# Patient Record
Sex: FEMALE | Race: WHITE | Employment: FULL TIME | URBAN - METROPOLITAN AREA
[De-identification: names, ages, dates, MRNs, and addresses within clinical notes are randomized per-mention and may not be internally consistent; named-entity substitution may affect disease eponyms.]

---

## 2017-10-24 ENCOUNTER — HOSPITAL ENCOUNTER (EMERGENCY)
Age: 22
Discharge: HOME | End: 2017-10-24
Attending: EMERGENCY MEDICINE | Admitting: EMERGENCY MEDICINE
Payer: SELF-PAY

## 2017-10-24 ENCOUNTER — APPOINTMENT (OUTPATIENT)
Dept: CT IMAGING | Age: 22
End: 2017-10-24
Payer: SELF-PAY

## 2017-10-24 VITALS
HEART RATE: 96 BPM | RESPIRATION RATE: 16 BRPM | TEMPERATURE: 97.5 F | BODY MASS INDEX: 26.93 KG/M2 | OXYGEN SATURATION: 99 % | HEIGHT: 66 IN | WEIGHT: 167.55 LBS | SYSTOLIC BLOOD PRESSURE: 137 MMHG | DIASTOLIC BLOOD PRESSURE: 102 MMHG

## 2017-10-24 DIAGNOSIS — R11.2 NON-INTRACTABLE VOMITING WITH NAUSEA, UNSPECIFIED VOMITING TYPE: ICD-10-CM

## 2017-10-24 DIAGNOSIS — N83.201 OVARIAN CYST, RIGHT: ICD-10-CM

## 2017-10-24 DIAGNOSIS — R10.31 ABDOMINAL PAIN, RIGHT LOWER QUADRANT: Primary | ICD-10-CM

## 2017-10-24 DIAGNOSIS — B37.31 CANDIDIASIS, VAGINA: ICD-10-CM

## 2017-10-24 DIAGNOSIS — R73.9 HYPERGLYCEMIA: ICD-10-CM

## 2017-10-24 LAB
ALBUMIN SERPL-MCNC: 2.8 G/DL (ref 3.5–5)
ALBUMIN/GLOB SERPL: 0.6 {RATIO} (ref 1.1–2.2)
ALP SERPL-CCNC: 224 U/L (ref 45–117)
ALT SERPL-CCNC: 85 U/L (ref 12–78)
ANION GAP SERPL CALC-SCNC: 20 MMOL/L (ref 5–15)
APPEARANCE UR: CLEAR
AST SERPL-CCNC: 70 U/L (ref 15–37)
BACTERIA URNS QL MICRO: ABNORMAL /HPF
BASOPHILS # BLD: 0 K/UL (ref 0–0.1)
BASOPHILS NFR BLD: 0 % (ref 0–1)
BILIRUB SERPL-MCNC: 0.3 MG/DL (ref 0.2–1)
BILIRUB UR QL: NEGATIVE
BUN SERPL-MCNC: 16 MG/DL (ref 6–20)
BUN/CREAT SERPL: 15 (ref 12–20)
CALCIUM SERPL-MCNC: 8.5 MG/DL (ref 8.5–10.1)
CHLORIDE SERPL-SCNC: 101 MMOL/L (ref 97–108)
CLUE CELLS VAG QL WET PREP: NORMAL
CO2 SERPL-SCNC: 13 MMOL/L (ref 21–32)
COLOR UR: ABNORMAL
CREAT SERPL-MCNC: 1.07 MG/DL (ref 0.55–1.02)
EOSINOPHIL # BLD: 0.1 K/UL (ref 0–0.4)
EOSINOPHIL NFR BLD: 1 % (ref 0–7)
EPITH CASTS URNS QL MICRO: ABNORMAL /LPF
ERYTHROCYTE [DISTWIDTH] IN BLOOD BY AUTOMATED COUNT: 16.3 % (ref 11.5–14.5)
GLOBULIN SER CALC-MCNC: 4.9 G/DL (ref 2–4)
GLUCOSE BLD STRIP.AUTO-MCNC: 71 MG/DL (ref 65–100)
GLUCOSE BLD STRIP.AUTO-MCNC: 73 MG/DL (ref 65–100)
GLUCOSE BLD STRIP.AUTO-MCNC: 74 MG/DL (ref 65–100)
GLUCOSE BLD STRIP.AUTO-MCNC: 82 MG/DL (ref 65–100)
GLUCOSE BLD STRIP.AUTO-MCNC: 83 MG/DL (ref 65–100)
GLUCOSE BLD STRIP.AUTO-MCNC: 85 MG/DL (ref 65–100)
GLUCOSE SERPL-MCNC: 481 MG/DL (ref 65–100)
GLUCOSE UR STRIP.AUTO-MCNC: >1000 MG/DL
HCG UR QL: NEGATIVE
HCT VFR BLD AUTO: 38.1 % (ref 35–47)
HGB BLD-MCNC: 12.2 G/DL (ref 11.5–16)
HGB UR QL STRIP: ABNORMAL
HYALINE CASTS URNS QL MICRO: ABNORMAL /LPF (ref 0–5)
KETONES UR QL STRIP.AUTO: 15 MG/DL
KOH PREP SPEC: NORMAL
LEUKOCYTE ESTERASE UR QL STRIP.AUTO: ABNORMAL
LIPASE SERPL-CCNC: 133 U/L (ref 73–393)
LYMPHOCYTES # BLD: 2.3 K/UL (ref 0.8–3.5)
LYMPHOCYTES NFR BLD: 23 % (ref 12–49)
MCH RBC QN AUTO: 27.3 PG (ref 26–34)
MCHC RBC AUTO-ENTMCNC: 32 G/DL (ref 30–36.5)
MCV RBC AUTO: 85.2 FL (ref 80–99)
MONOCYTES # BLD: 0.3 K/UL (ref 0–1)
MONOCYTES NFR BLD: 4 % (ref 5–13)
NEUTS SEG # BLD: 7.1 K/UL (ref 1.8–8)
NEUTS SEG NFR BLD: 72 % (ref 32–75)
NITRITE UR QL STRIP.AUTO: NEGATIVE
PH UR STRIP: 5.5 [PH] (ref 5–8)
PLATELET # BLD AUTO: 441 K/UL (ref 150–400)
POTASSIUM SERPL-SCNC: 3.5 MMOL/L (ref 3.5–5.1)
PROT SERPL-MCNC: 7.7 G/DL (ref 6.4–8.2)
PROT UR STRIP-MCNC: NEGATIVE MG/DL
RBC # BLD AUTO: 4.47 M/UL (ref 3.8–5.2)
RBC #/AREA URNS HPF: ABNORMAL /HPF (ref 0–5)
SERVICE CMNT-IMP: NORMAL
SODIUM SERPL-SCNC: 134 MMOL/L (ref 136–145)
SP GR UR REFRACTOMETRY: 1.03 (ref 1–1.03)
T VAGINALIS VAG QL WET PREP: NORMAL
UA: UC IF INDICATED,UAUC: ABNORMAL
UROBILINOGEN UR QL STRIP.AUTO: 0.2 EU/DL (ref 0.2–1)
WBC # BLD AUTO: 9.8 K/UL (ref 3.6–11)
WBC URNS QL MICRO: ABNORMAL /HPF (ref 0–4)

## 2017-10-24 PROCEDURE — 87210 SMEAR WET MOUNT SALINE/INK: CPT | Performed by: PHYSICIAN ASSISTANT

## 2017-10-24 PROCEDURE — 74011250636 HC RX REV CODE- 250/636: Performed by: EMERGENCY MEDICINE

## 2017-10-24 PROCEDURE — 36415 COLL VENOUS BLD VENIPUNCTURE: CPT | Performed by: EMERGENCY MEDICINE

## 2017-10-24 PROCEDURE — 74011250636 HC RX REV CODE- 250/636: Performed by: PHYSICIAN ASSISTANT

## 2017-10-24 PROCEDURE — 80053 COMPREHEN METABOLIC PANEL: CPT | Performed by: EMERGENCY MEDICINE

## 2017-10-24 PROCEDURE — 74177 CT ABD & PELVIS W/CONTRAST: CPT

## 2017-10-24 PROCEDURE — 81025 URINE PREGNANCY TEST: CPT | Performed by: EMERGENCY MEDICINE

## 2017-10-24 PROCEDURE — 74011636320 HC RX REV CODE- 636/320: Performed by: PHYSICIAN ASSISTANT

## 2017-10-24 PROCEDURE — 74011636637 HC RX REV CODE- 636/637: Performed by: PHYSICIAN ASSISTANT

## 2017-10-24 PROCEDURE — 87147 CULTURE TYPE IMMUNOLOGIC: CPT

## 2017-10-24 PROCEDURE — 81001 URINALYSIS AUTO W/SCOPE: CPT | Performed by: EMERGENCY MEDICINE

## 2017-10-24 PROCEDURE — 85025 COMPLETE CBC W/AUTO DIFF WBC: CPT | Performed by: EMERGENCY MEDICINE

## 2017-10-24 PROCEDURE — 96361 HYDRATE IV INFUSION ADD-ON: CPT

## 2017-10-24 PROCEDURE — 96374 THER/PROPH/DIAG INJ IV PUSH: CPT

## 2017-10-24 PROCEDURE — 99285 EMERGENCY DEPT VISIT HI MDM: CPT

## 2017-10-24 PROCEDURE — 87491 CHLMYD TRACH DNA AMP PROBE: CPT | Performed by: PHYSICIAN ASSISTANT

## 2017-10-24 PROCEDURE — 87086 URINE CULTURE/COLONY COUNT: CPT

## 2017-10-24 PROCEDURE — 74011636320 HC RX REV CODE- 636/320: Performed by: EMERGENCY MEDICINE

## 2017-10-24 PROCEDURE — 83690 ASSAY OF LIPASE: CPT | Performed by: PHYSICIAN ASSISTANT

## 2017-10-24 PROCEDURE — 82962 GLUCOSE BLOOD TEST: CPT

## 2017-10-24 PROCEDURE — 96375 TX/PRO/DX INJ NEW DRUG ADDON: CPT

## 2017-10-24 PROCEDURE — 96376 TX/PRO/DX INJ SAME DRUG ADON: CPT

## 2017-10-24 RX ORDER — MORPHINE SULFATE 4 MG/ML
2 INJECTION, SOLUTION INTRAMUSCULAR; INTRAVENOUS
Status: COMPLETED | OUTPATIENT
Start: 2017-10-24 | End: 2017-10-24

## 2017-10-24 RX ORDER — ONDANSETRON 2 MG/ML
4 INJECTION INTRAMUSCULAR; INTRAVENOUS
Status: COMPLETED | OUTPATIENT
Start: 2017-10-24 | End: 2017-10-24

## 2017-10-24 RX ORDER — SODIUM CHLORIDE 0.9 % (FLUSH) 0.9 %
5-10 SYRINGE (ML) INJECTION EVERY 8 HOURS
Status: DISCONTINUED | OUTPATIENT
Start: 2017-10-24 | End: 2017-10-24 | Stop reason: HOSPADM

## 2017-10-24 RX ORDER — SODIUM CHLORIDE 0.9 % (FLUSH) 0.9 %
5-10 SYRINGE (ML) INJECTION AS NEEDED
Status: DISCONTINUED | OUTPATIENT
Start: 2017-10-24 | End: 2017-10-24 | Stop reason: HOSPADM

## 2017-10-24 RX ORDER — ONDANSETRON 4 MG/1
4 TABLET, ORALLY DISINTEGRATING ORAL
Qty: 10 TAB | Refills: 0 | Status: SHIPPED | OUTPATIENT
Start: 2017-10-24 | End: 2017-11-28

## 2017-10-24 RX ORDER — SODIUM CHLORIDE 9 MG/ML
50 INJECTION, SOLUTION INTRAVENOUS
Status: COMPLETED | OUTPATIENT
Start: 2017-10-24 | End: 2017-10-24

## 2017-10-24 RX ORDER — SODIUM CHLORIDE 0.9 % (FLUSH) 0.9 %
10 SYRINGE (ML) INJECTION
Status: COMPLETED | OUTPATIENT
Start: 2017-10-24 | End: 2017-10-24

## 2017-10-24 RX ORDER — FLUCONAZOLE 150 MG/1
150 TABLET ORAL
Qty: 1 TAB | Refills: 1 | Status: SHIPPED | OUTPATIENT
Start: 2017-10-24 | End: 2017-10-24

## 2017-10-24 RX ORDER — MORPHINE SULFATE 4 MG/ML
4 INJECTION, SOLUTION INTRAMUSCULAR; INTRAVENOUS
Status: COMPLETED | OUTPATIENT
Start: 2017-10-24 | End: 2017-10-24

## 2017-10-24 RX ORDER — HYDROCODONE BITARTRATE AND ACETAMINOPHEN 5; 325 MG/1; MG/1
1 TABLET ORAL
Qty: 15 TAB | Refills: 0 | Status: SHIPPED | OUTPATIENT
Start: 2017-10-24 | End: 2017-11-28

## 2017-10-24 RX ORDER — INSULIN ASPART 100 [IU]/ML
INJECTION, SOLUTION INTRAVENOUS; SUBCUTANEOUS
Status: ON HOLD | COMMUNITY
End: 2017-11-29

## 2017-10-24 RX ORDER — INSULIN GLARGINE 100 [IU]/ML
42 INJECTION, SOLUTION SUBCUTANEOUS DAILY
Status: ON HOLD | COMMUNITY
End: 2017-11-29

## 2017-10-24 RX ADMIN — DIATRIZOATE MEGLUMINE AND DIATRIZOATE SODIUM 30 ML: 600; 100 SOLUTION ORAL; RECTAL at 13:59

## 2017-10-24 RX ADMIN — SODIUM CHLORIDE 50 ML/HR: 900 INJECTION, SOLUTION INTRAVENOUS at 15:20

## 2017-10-24 RX ADMIN — Medication 10 ML: at 15:20

## 2017-10-24 RX ADMIN — INSULIN HUMAN 6 UNITS: 100 INJECTION, SOLUTION PARENTERAL at 13:55

## 2017-10-24 RX ADMIN — Medication 10 ML: at 13:59

## 2017-10-24 RX ADMIN — SODIUM CHLORIDE 1000 ML: 900 INJECTION, SOLUTION INTRAVENOUS at 13:55

## 2017-10-24 RX ADMIN — MORPHINE SULFATE 4 MG: 4 INJECTION, SOLUTION INTRAMUSCULAR; INTRAVENOUS at 13:56

## 2017-10-24 RX ADMIN — IOPAMIDOL 100 ML: 755 INJECTION, SOLUTION INTRAVENOUS at 15:20

## 2017-10-24 RX ADMIN — MORPHINE SULFATE 2 MG: 4 INJECTION, SOLUTION INTRAMUSCULAR; INTRAVENOUS at 17:45

## 2017-10-24 RX ADMIN — ONDANSETRON HYDROCHLORIDE 4 MG: 2 INJECTION, SOLUTION INTRAMUSCULAR; INTRAVENOUS at 17:45

## 2017-10-24 RX ADMIN — ONDANSETRON 4 MG: 2 INJECTION INTRAMUSCULAR; INTRAVENOUS at 13:56

## 2017-10-24 NOTE — ED NOTES
Shift report received from Shiela Brady RN. Assumed care of patient at this time. Patient at CT at this time.

## 2017-10-24 NOTE — DISCHARGE INSTRUCTIONS
Abdominal Pain: Care Instructions  Your Care Instructions    Abdominal pain has many possible causes. Some aren't serious and get better on their own in a few days. Others need more testing and treatment. If your pain continues or gets worse, you need to be rechecked and may need more tests to find out what is wrong. You may need surgery to correct the problem. Don't ignore new symptoms, such as fever, nausea and vomiting, urination problems, pain that gets worse, and dizziness. These may be signs of a more serious problem. Your doctor may have recommended a follow-up visit in the next 8 to 12 hours. If you are not getting better, you may need more tests or treatment. The doctor has checked you carefully, but problems can develop later. If you notice any problems or new symptoms, get medical treatment right away. Follow-up care is a key part of your treatment and safety. Be sure to make and go to all appointments, and call your doctor if you are having problems. It's also a good idea to know your test results and keep a list of the medicines you take. How can you care for yourself at home? · Rest until you feel better. · To prevent dehydration, drink plenty of fluids, enough so that your urine is light yellow or clear like water. Choose water and other caffeine-free clear liquids until you feel better. If you have kidney, heart, or liver disease and have to limit fluids, talk with your doctor before you increase the amount of fluids you drink. · If your stomach is upset, eat mild foods, such as rice, dry toast or crackers, bananas, and applesauce. Try eating several small meals instead of two or three large ones. · Wait until 48 hours after all symptoms have gone away before you have spicy foods, alcohol, and drinks that contain caffeine. · Do not eat foods that are high in fat. · Avoid anti-inflammatory medicines such as aspirin, ibuprofen (Advil, Motrin), and naproxen (Aleve).  These can cause stomach upset. Talk to your doctor if you take daily aspirin for another health problem. When should you call for help? Call 911 anytime you think you may need emergency care. For example, call if:  · You passed out (lost consciousness). · You pass maroon or very bloody stools. · You vomit blood or what looks like coffee grounds. · You have new, severe belly pain. Call your doctor now or seek immediate medical care if:  · Your pain gets worse, especially if it becomes focused in one area of your belly. · You have a new or higher fever. · Your stools are black and look like tar, or they have streaks of blood. · You have unexpected vaginal bleeding. · You have symptoms of a urinary tract infection. These may include:  ¨ Pain when you urinate. ¨ Urinating more often than usual.  ¨ Blood in your urine. · You are dizzy or lightheaded, or you feel like you may faint. Watch closely for changes in your health, and be sure to contact your doctor if:  · You are not getting better after 1 day (24 hours). Where can you learn more? Go to http://j[employer] Internationalcharito.info/. Enter N910 in the search box to learn more about \"Abdominal Pain: Care Instructions. \"  Current as of: March 20, 2017  Content Version: 11.3  © 1688-7628 [employer]. Care instructions adapted under license by [employer] (which disclaims liability or warranty for this information). If you have questions about a medical condition or this instruction, always ask your healthcare professional. [place] [address] any warranty or liability for your use of this information. Functional Ovarian Cyst: Care Instructions  Your Care Instructions    A functional ovarian cyst is a sac that forms on the surface of a woman's ovary during ovulation. The sac holds a maturing egg. Usually the sac goes away after the egg is released.  But if the egg is not released, or if the sac closes up after the egg is released, the sac can swell up with fluid and form a cyst.  Functional ovarian cysts are different than ovarian growths caused by other problems, such as cancer. Most functional ovarian cysts cause no symptoms and go away on their own. Some cause mild pain. Others can cause severe pain when they rupture or bleed. Follow-up care is a key part of your treatment and safety. Be sure to make and go to all appointments, and call your doctor if you are having problems. It's also a good idea to know your test results and keep a list of the medicines you take. How can you care for yourself at home? · Use heat, such as a hot water bottle, a heating pad set on low, or a warm bath, to relax tense muscles and relieve cramping. · Take pain medicines exactly as directed. ¨ If the doctor gave you a prescription medicine for pain, take it as prescribed. ¨ If you are not taking a prescription pain medicine, ask your doctor if you can take an over-the-counter medicine. · Avoid constipation. Make sure you drink enough fluids and include fruits, vegetables, and fiber in your diet each day. Constipation does not cause ovarian cysts, but it may make your pelvic pain worse. When should you call for help? Call 911 anytime you think you may need emergency care. For example, call if:  · You passed out (lost consciousness). · You have sudden, severe pain in your belly or your pelvis. Call your doctor now or seek immediate medical care if:  · You have new belly or pelvic pain, or your pain gets worse. · You have severe vaginal bleeding. This means that you are soaking through your usual pads or tampons each hour for 2 or more hours. · You are dizzy or lightheaded, or you feel like you may faint. · You have pain or bleeding during or after sex. Watch closely for changes in your health, and be sure to contact your doctor if:  · Your pain keeps you from doing the things that you enjoy. · You do not get better as expected.   Where can you learn more? Go to http://jude-charito.info/. Enter K771 in the search box to learn more about \"Functional Ovarian Cyst: Care Instructions. \"  Current as of: October 13, 2016  Content Version: 11.3  © 6908-3496 [employer]. Care instructions adapted under license by [employer] (which disclaims liability or warranty for this information). If you have questions about a medical condition or this instruction, always ask your healthcare professional. [address] any warranty or liability for your use of this information. Candidiasis: Care Instructions  Your Care Instructions  Candidiasis (say \"gpf-otc-AI-uh-ama\") is a yeast infection. Yeast normally lives in your body. But it can cause problems if your body's defenses don't work as they should. Some medicines can increase your chance of getting a yeast infection. These include antibiotics, steroids, and cancer drugs. And some diseases like AIDS and diabetes can make you more likely to get yeast infections. There are different types of yeast infections. Ida Apt is a yeast infection in the mouth. It usually occurs in people with weak immune systems. It causes white patches inside the mouth and throat. Yeast infections of the skin usually occur in skin folds where the skin stays moist. They cause red, oozing patches on your skin. Babies can get these infections under the diaper. People who often wear gloves can get them on their hands. Many women get vaginal yeast infections. They are most common when women take antibiotics. These infections can cause the vagina to itch and burn. They also cause white discharge that looks like cottage cheese. In rare cases, yeast infects the blood. This can cause serious disease. This kind of infection is treated with medicine given through a needle into a vein (IV). After you start treatment, a yeast infection usually goes away quickly.  But if your immune system is weak, the infection may come back. Tell your doctor if you get yeast infections often. Follow-up care is a key part of your treatment and safety. Be sure to make and go to all appointments, and call your doctor if you are having problems. It's also a good idea to know your test results and keep a list of the medicines you take. How can you care for yourself at home? · Take your medicines exactly as prescribed. Call your doctor if you think you are having a problem with your medicine. · Use antibiotics only as directed by your doctor. · Eat yogurt with live cultures. It has bacteria called lactobacillus. It may help prevent some types of yeast infections. · Keep your skin clean and dry. Put powder on moist places. · If you are using a cream or suppository to treat a vaginal yeast infection, don't use condoms or a diaphragm. Use a different type of birth control. · Eat a healthy diet and get regular exercise. This will help keep your immune system strong. When should you call for help? Call your doctor now or seek immediate medical care if:  · You have a fever. · You are pregnant and have signs of a vaginal or urinary tract infection such as:  ¨ Severe itching in your vagina. ¨ Pain during sex or when you urinate. ¨ Unusual discharge from your vagina. ¨ A frequent urge to urinate. ¨ Urine that is cloudy or smells bad. Watch closely for changes in your health, and be sure to contact your doctor if:  · You do not get better as expected. Where can you learn more? Go to http://jude-charito.info/. Enter C923 in the search box to learn more about \"Candidiasis: Care Instructions. \"  Current as of: October 13, 2016  Content Version: 11.3  © 4245-8283 [employer]. Care instructions adapted under license by [employer] (which disclaims liability or warranty for this information).  If you have questions about a medical condition or this instruction, always ask your healthcare professional. [place] [address] any warranty or liability for your use of this information.

## 2017-10-24 NOTE — ED PROVIDER NOTES
[facility].  EMERGENCY DEPARTMENT HISTORY AND PHYSICAL EXAM       Date of Service: 10/24/2017   Patient Name: Adi Harris   YOB: 1995  Medical Record Number: [id number]    History of Presenting Illness     Chief Complaint   Patient presents with    Abdominal Pain     pt c/o diffuse abdominal pain x1.5 weeks with nausea. Last menstrual period was early September. Pt denies diarrhea or urinary symptoms. History Provided By:  patient    Additional History:   Adi Harris is a 25 y.o. female with PMhx significant for IDDM who presents ambulatory to the ED with cc of intermittent, sharp ABD pain x 1-2 weeks. She states her pain is located in her lower ABD. Pt is also complaining of N/V as well as associated CP and SOB secondary to her episodes of vomiting. She notes her blood sugar has been elevated the past couple of days. She states she had a dose of Novolog 10 units at 4:00-5:00 AM this morning. Pt reports she has been having normal BM and drinking fluids. She notes she has not been able to eat today. Pt specifically denies any fevers, vaginal or urinary symptoms, or any other complaints at this time. Social Hx: - Tobacco, + EtOH, - Illicit Drugs    There are no other complaints, changes or physical findings at this time. Primary Care Provider: None     Past History     Past Medical History:   Past Medical History:   Diagnosis Date    Diabetes [facility])         Past Surgical History:   History reviewed. No pertinent surgical history. Family History:   History reviewed. No pertinent family history. Social History:   Social History   Substance Use Topics    Smoking status: Never Smoker    Smokeless tobacco: Never Used    Alcohol use Yes      Comment: social        Allergies:   No Known Allergies      Review of Systems   Review of Systems   Constitutional: Negative for chills and fever.    HENT: Negative for congestion, rhinorrhea and sore throat. Respiratory: Positive for shortness of breath. Negative for cough. Cardiovascular: Positive for chest pain. Negative for palpitations. Gastrointestinal: Positive for abdominal pain (lower), nausea and vomiting. Negative for diarrhea. Genitourinary: Negative for dysuria, frequency, hematuria, vaginal bleeding, vaginal discharge and vaginal pain. Musculoskeletal: Negative for neck pain and neck stiffness. Skin: Negative for rash and wound. Neurological: Negative for dizziness and headaches. Psychiatric/Behavioral: Negative for agitation and confusion. Physical Exam  Physical Exam   Constitutional: She is oriented to person, place, and time. She appears well-developed and well-nourished. Pt appears in moderate discomfort   HENT:   Head: Normocephalic and atraumatic. Nose: Nose normal.   Mouth/Throat: No oropharyngeal exudate. Dry MM   Eyes: Conjunctivae and EOM are normal. Right eye exhibits no discharge. Left eye exhibits no discharge. No scleral icterus. Neck: Normal range of motion. Neck supple. No JVD present. No tracheal deviation present. No thyromegaly present. Cardiovascular: Normal rate, regular rhythm and normal heart sounds. Pulmonary/Chest: Effort normal and breath sounds normal. No respiratory distress. She has no wheezes. Abdominal: Soft. Moderate RLQ tenderness  Mild RUQ tenderness    Musculoskeletal: Normal range of motion. She exhibits no edema. Lymphadenopathy:     She has no cervical adenopathy. Neurological: She is alert and oriented to person, place, and time. She exhibits normal muscle tone. Coordination normal.   Skin: Skin is warm and dry. She is not diaphoretic. Psychiatric: She has a normal mood and affect. Her behavior is normal. Judgment normal.   Nursing note and vitals reviewed. Medical Decision Making   I am the first provider for this patient.      I reviewed the vital signs, available nursing notes, past medical history, past surgical history, family history and social history. Old Medical Records: Old medical records. Provider Notes:   DDx: Appendicitis, gastroenteritis, UTI, electrolyte dysfunction, dehydration       ED Course:  1:25 PM   Initial assessment performed. The patients presenting problems have been discussed, and they are in agreement with the care plan formulated and outlined with them. I have encouraged them to ask questions as they arise throughout their visit. PROGRESS NOTE:   2:58 PM  Pt reevaluated. Pt's blood sugar has dropped to 80. Blood sugar was rechecked and dropped again to 76. Will give pt orange juice now and a meal tray once she returns from CT. Discussed pt with Ratna Cartagena MD and he is in agreement with plan. PROGRESS NOTE:   5:02 PM  Ratna Cartagena MD agrees that no pelvic US is needed and a pelvic exam will be sufficient. Procedure Note - Pelvic Exam:    Pelvic Exam  Date/Time: 10/24/2017 5:18 PM  Performed by: PA  Procedure duration:  15 minutes. Documented by:  Travis Ann PA-C. Exam assisted by:  Eb Tafoya RN. Type of exam performed: bimanual and speculum. External genitalia appearance: normal.    Vaginal exam:  discharge. The amount of discharge was:  mild. The discharge was thick and white. Cervical exam:  os closed and no cervical motion tenderness. Specimen(s) collected:  chlamydia and GC. Bimanual exam:  right adenexal tenderness.     Patient tolerance: Patient tolerated the procedure well with no immediate complications        Diagnostic Study Results     Labs -      Recent Results (from the past 12 hour(s))   URINALYSIS W/ REFLEX CULTURE    Collection Time: 10/24/17 12:12 PM   Result Value Ref Range    Color YELLOW/STRAW      Appearance CLEAR CLEAR      Specific gravity 1.027 1.003 - 1.030      pH (UA) 5.5 5.0 - 8.0      Protein NEGATIVE  NEG mg/dL    Glucose >1000 (A) NEG mg/dL    Ketone 15 (A) NEG mg/dL    Bilirubin NEGATIVE  NEG      Blood TRACE (A) NEG      Urobilinogen 0.2 0.2 - 1.0 EU/dL    Nitrites NEGATIVE  NEG      Leukocyte Esterase SMALL (A) NEG      WBC 20-50 0 - 4 /hpf    RBC 0-5 0 - 5 /hpf    Epithelial cells FEW FEW /lpf    Bacteria 1+ (A) NEG /hpf    UA:UC IF INDICATED URINE CULTURE ORDERED (A) CNI      Hyaline cast 0-2 0 - 5 /lpf   HCG URINE, QL    Collection Time: 10/24/17 12:12 PM   Result Value Ref Range    HCG urine, Ql. NEGATIVE  NEG     METABOLIC PANEL, COMPREHENSIVE    Collection Time: 10/24/17 12:18 PM   Result Value Ref Range    Sodium 134 (L) 136 - 145 mmol/L    Potassium 3.5 3.5 - 5.1 mmol/L    Chloride 101 97 - 108 mmol/L    CO2 13 (LL) 21 - 32 mmol/L    Anion gap 20 (H) 5 - 15 mmol/L    Glucose 481 (H) 65 - 100 mg/dL    BUN 16 6 - 20 MG/DL    Creatinine 1.07 (H) 0.55 - 1.02 MG/DL    BUN/Creatinine ratio 15 12 - 20      GFR est AA >60 >60 ml/min/1.73m2    GFR est non-AA >60 >60 ml/min/1.73m2    Calcium 8.5 8.5 - 10.1 MG/DL    Bilirubin, total 0.3 0.2 - 1.0 MG/DL    ALT (SGPT) 85 (H) 12 - 78 U/L    AST (SGOT) 70 (H) 15 - 37 U/L    Alk. phosphatase 224 (H) 45 - 117 U/L    Protein, total 7.7 6.4 - 8.2 g/dL    Albumin 2.8 (L) 3.5 - 5.0 g/dL    Globulin 4.9 (H) 2.0 - 4.0 g/dL    A-G Ratio 0.6 (L) 1.1 - 2.2     CBC WITH AUTOMATED DIFF    Collection Time: 10/24/17 12:18 PM   Result Value Ref Range    WBC 9.8 3.6 - 11.0 K/uL    RBC 4.47 3.80 - 5.20 M/uL    HGB 12.2 11.5 - 16.0 g/dL    HCT 38.1 35.0 - 47.0 %    MCV 85.2 80.0 - 99.0 FL    MCH 27.3 26.0 - 34.0 PG    MCHC 32.0 30.0 - 36.5 g/dL    RDW 16.3 (H) 11.5 - 14.5 %    PLATELET 380 (H) 411 - 400 K/uL    NEUTROPHILS 72 32 - 75 %    LYMPHOCYTES 23 12 - 49 %    MONOCYTES 4 (L) 5 - 13 %    EOSINOPHILS 1 0 - 7 %    BASOPHILS 0 0 - 1 %    ABS. NEUTROPHILS 7.1 1.8 - 8.0 K/UL    ABS. LYMPHOCYTES 2.3 0.8 - 3.5 K/UL    ABS. MONOCYTES 0.3 0.0 - 1.0 K/UL    ABS. EOSINOPHILS 0.1 0.0 - 0.4 K/UL    ABS.  BASOPHILS 0.0 0.0 - 0.1 K/UL   LIPASE    Collection Time: 10/24/17 12:18 PM   Result Value Ref Range    Lipase 133 73 - 393 U/L   GLUCOSE, POC    Collection Time: 10/24/17  2:48 PM   Result Value Ref Range    Glucose (POC) 82 65 - 100 mg/dL    Performed by Kapataanastasia \"Bam\" Constantinos*    GLUCOSE, POC    Collection Time: 10/24/17  2:49 PM   Result Value Ref Range    Glucose (POC) 73 65 - 100 mg/dL    Performed by Bertaataanastasia \"Bam\" Constantinos*    GLUCOSE, POC    Collection Time: 10/24/17  3:02 PM   Result Value Ref Range    Glucose (POC) 83 65 - 100 mg/dL    Performed by Kapataidakis \"Bam\" Constantinos*    GLUCOSE, POC    Collection Time: 10/24/17  3:56 PM   Result Value Ref Range    Glucose (POC) 71 65 - 100 mg/dL    Performed by [address] [place] [address], POC    Collection Time: 10/24/17  4:21 PM   Result Value Ref Range    Glucose (POC) 74 65 - 100 mg/dL    Performed by [address] [place] [address], POC    Collection Time: 10/24/17  4:35 PM   Result Value Ref Range    Glucose (POC) 85 65 - 100 mg/dL    Performed by Yessi Augustine, OTHER SOURCES    Collection Time: 10/24/17  5:25 PM   Result Value Ref Range    Special Requests: NO SPECIAL REQUESTS      KOH 1+  BUDDING YEAST  PRESENT      WET PREP    Collection Time: 10/24/17  5:25 PM   Result Value Ref Range    Clue cells CLUE CELLS ABSENT      Wet prep NO TRICHOMONAS SEEN         Radiologic Studies -  The following have been ordered and reviewed:    CT Results  (Last 48 hours)               10/24/17 1525  CT ABD PELV W CONT Final result    Impression:  IMPRESSION:       1. Normal-appearing appendix. 2. Right lower quadrant stranding associated with 2 ovarian follicles which may   be due to follicular rupture. 3. Hepatomegaly. Narrative:  EXAM:  CT ABD PELV W CONT       INDICATION: abd pain, most pronounced to RLQ       COMPARISON: None       CONTRAST:  100 mL of Isovue-370. TECHNIQUE:    Following the uneventful intravenous administration of contrast, thin axial   images were obtained through the abdomen and pelvis.  Coronal and sagittal   reconstructions were generated. Oral contrast was not administered. CT dose   reduction was achieved through use of a standardized protocol tailored for this   examination and automatic exposure control for dose modulation. FINDINGS:    LUNG BASES: Clear. INCIDENTALLY IMAGED HEART AND MEDIASTINUM: Unremarkable. LIVER: Enlarged. GALLBLADDER: Unremarkable. SPLEEN: No mass. PANCREAS: No mass or ductal dilatation. ADRENALS: Unremarkable. KIDNEYS: No mass, calculus, or hydronephrosis. STOMACH: Unremarkable. SMALL BOWEL: No dilatation or wall thickening. COLON: No dilatation or wall thickening. APPENDIX: Unremarkable. PERITONEUM: Stranding in the right lower quadrant pelvic free fluid. RETROPERITONEUM: No lymphadenopathy or aortic aneurysm. REPRODUCTIVE ORGANS: 2 right ovarian follicles. URINARY BLADDER: No mass or calculus. BONES: No destructive bone lesion. ADDITIONAL COMMENTS: N/A                 Vital Signs-Reviewed the patient's vital signs.    Patient Vitals for the past 12 hrs:   Temp Pulse Resp BP SpO2   10/24/17 1800 - - - (!) 137/102 99 %   10/24/17 1745 - - - 127/88 99 %   10/24/17 1730 - - - (!) 122/103 99 %   10/24/17 1715 - - - (!) 129/96 98 %   10/24/17 1700 - - - (!) 138/94 99 %   10/24/17 1630 - - - (!) 136/96 99 %   10/24/17 1615 - - - (!) 129/92 99 %   10/24/17 1554 - - - 131/89 99 %   10/24/17 1430 - - - (!) 134/91 98 %   10/24/17 1415 - - - (!) 145/94 99 %   10/24/17 1355 - - - - 96 %   10/24/17 1353 - - - (!) 142/99 -   10/24/17 1210 97.5 °F (36.4 °C) 96 16 (!) 158/100 100 %       Medications Given in the ED:  Medications   sodium chloride (NS) flush 5-10 mL (10 mL IntraVENous Given 10/24/17 1359)   sodium chloride (NS) flush 5-10 mL (10 mL IntraVENous Given 10/24/17 1359)   sodium chloride 0.9 % bolus infusion 1,000 mL (0 mL IntraVENous IV Completed 10/24/17 1445)   insulin regular (NOVOLIN R, HUMULIN R) injection 6 Units (6 Units IntraVENous Given 10/24/17 1355)   ondansetron (ZOFRAN) injection 4 mg (4 mg IntraVENous Given 10/24/17 1356)   diatrizoate meglumine-d.sodium (MD-GASTROVIEW,GASTROGRAFIN) 66-10 % contrast solution 30 mL (30 mL Oral Given 10/24/17 1359)   morphine injection 4 mg (4 mg IntraVENous Given 10/24/17 1356)   0.9% sodium chloride infusion (0 mL/hr IntraVENous IV Completed 10/24/17 1531)   sodium chloride (NS) flush 10 mL (10 mL IntraVENous Given 10/24/17 1520)   iopamidol (ISOVUE-370) 76 % injection 100 mL (100 mL IntraVENous Given 10/24/17 1520)   morphine injection 2 mg (2 mg IntraVENous Given 10/24/17 1745)   ondansetron (ZOFRAN) injection 4 mg (4 mg IntraVENous Given 10/24/17 1745)       Diagnosis   Clinical Impression:   1. Abdominal pain, right lower quadrant    2. Non-intractable vomiting with nausea, unspecified vomiting type    3. Hyperglycemia    4. IDDM (insulin dependent diabetes mellitus) ([facility] [address]    5. Ovarian cyst, right    6. Candidiasis, vagina         Plan:  1:   Follow-up Information     Follow up With Details Comments MD Crystal   [address] [place] [address] Right [address] [facility]  [phone]      [facility] [place] EM[facility]  If symptoms worsen [address] [place] [address]  [address] [place] [address]  [phone]        2:   Current Discharge Medication List      START taking these medications    Details   HYDROcodone-acetaminophen (NORCO) 5-325 mg per tablet Take 1 Tab by mouth every six (6) hours as needed for Pain for up to 15 doses. Max Daily Amount: 4 Tabs. Qty: 15 Tab, Refills: 0      ondansetron (ZOFRAN ODT) 4 mg disintegrating tablet Take 1 Tab by mouth every eight (8) hours as needed for Nausea for up to 10 doses. Qty: 10 Tab, Refills: 0      fluconazole (DIFLUCAN) 150 mg tablet Take 1 Tab by mouth now for 1 dose. FDA advises cautious prescribing of oral fluconazole in pregnancy.   Qty: 1 Tab, Refills: 1           Return to ED if Worse    Disposition Note:  DISCHARGE NOTE  6:10 PM  The patient has been re-evaluated and is ready for discharge. Reviewed available results with patient. Counseled pt on diagnosis and care plan. Pt has expressed understanding, and all questions have been answered. Pt agrees with plan and agrees to follow up as recommended, or return to the ED if their symptoms worsen. Discharge instructions have been provided and explained to the pt, along with reasons to return to the ED. Attestations: This note is prepared by Deja Perdomo, acting as Scribe for [facility]. IVY Durán: The scribe's documentation has been prepared under my direction and personally reviewed by me in its entirety. I confirm that the note above accurately reflects all work, treatment, procedures, and medical decision making performed by me.

## 2017-10-24 NOTE — LETTER
[address] 
[facility] EMERGENCY DEPT 
[address] [place] [address] [address] [id number] [phone] Work/School Note Date: 10/24/2017 To Whom It May concern: 
 
Hortencia Wiggins was seen and treated today in the emergency room. She may return to work in 1 to 2 days, as symptoms improve. Sincerely, Tavia, [address]

## 2017-10-24 NOTE — ED NOTES
HYPOGLYCEMIC EPISODE DOCUMENTATION    Patient with hypoglycemic episode(s) at 1556 (time) on 10/24/2017 (date). BG value(s) pre-treatment 70    Was patient symptomatic? [] yes, [x] no  Patient was treated with the following rescue medications/treatments: [] D50                [] Glucose tablets                [] Glucagon                [x] 4oz juice                [] 6oz reg soda                [] 8oz low fat milk  BG value post-treatment: 74    Given  4 oz.  Of juice and Blood Sugar 85 post treatment     Once BG treated and value greater than 80mg/dl, pt was provided with the following:  [] snack  [x] meal  Name of MD notified: Dr. Salomon Allen  The following orders were received: treat with juice and give patient meal

## 2017-10-25 LAB
BACTERIA SPEC CULT: ABNORMAL
BACTERIA SPEC CULT: ABNORMAL
C TRACH DNA SPEC QL NAA+PROBE: NEGATIVE
CC UR VC: ABNORMAL
N GONORRHOEA DNA SPEC QL NAA+PROBE: NEGATIVE
SAMPLE TYPE: NORMAL
SERVICE CMNT-IMP: ABNORMAL
SERVICE CMNT-IMP: NORMAL
SPECIMEN SOURCE: NORMAL

## 2017-11-28 ENCOUNTER — HOSPITAL ENCOUNTER (INPATIENT)
Age: 22
LOS: 1 days | Discharge: HOME | DRG: 639 | End: 2017-11-29
Attending: EMERGENCY MEDICINE | Admitting: HOSPITALIST
Payer: SELF-PAY

## 2017-11-28 ENCOUNTER — APPOINTMENT (OUTPATIENT)
Dept: GENERAL RADIOLOGY | Age: 22
DRG: 639 | End: 2017-11-28
Attending: EMERGENCY MEDICINE
Payer: SELF-PAY

## 2017-11-28 DIAGNOSIS — E10.10 DIABETIC KETOACIDOSIS WITHOUT COMA ASSOCIATED WITH TYPE 1 DIABETES MELLITUS (HCC): Primary | ICD-10-CM

## 2017-11-28 PROBLEM — E11.10 DKA (DIABETIC KETOACIDOSES): Status: ACTIVE | Noted: 2017-11-28

## 2017-11-28 LAB
ADMINISTERED INITIALS, ADMINIT: NORMAL
ALBUMIN SERPL-MCNC: 3 G/DL (ref 3.5–5)
ALBUMIN/GLOB SERPL: 0.7 {RATIO} (ref 1.1–2.2)
ALP SERPL-CCNC: 216 U/L (ref 45–117)
ALT SERPL-CCNC: 101 U/L (ref 12–78)
AMMONIA PLAS-SCNC: 16 UMOL/L
ANION GAP SERPL CALC-SCNC: 18 MMOL/L (ref 5–15)
ANION GAP SERPL CALC-SCNC: 19 MMOL/L (ref 5–15)
ANION GAP SERPL CALC-SCNC: 20 MMOL/L (ref 5–15)
APPEARANCE UR: CLEAR
APTT PPP: 22.6 SEC (ref 22.1–32.5)
AST SERPL-CCNC: 75 U/L (ref 15–37)
BACTERIA URNS QL MICRO: NEGATIVE /HPF
BASOPHILS # BLD: 0 K/UL (ref 0–0.1)
BASOPHILS NFR BLD: 1 % (ref 0–1)
BILIRUB SERPL-MCNC: 0.6 MG/DL (ref 0.2–1)
BILIRUB UR QL: NEGATIVE
BUN SERPL-MCNC: 10 MG/DL (ref 6–20)
BUN SERPL-MCNC: 6 MG/DL (ref 6–20)
BUN SERPL-MCNC: 7 MG/DL (ref 6–20)
BUN/CREAT SERPL: 14 (ref 12–20)
BUN/CREAT SERPL: 7 (ref 12–20)
BUN/CREAT SERPL: 9 (ref 12–20)
CALCIUM SERPL-MCNC: 7.8 MG/DL (ref 8.5–10.1)
CALCIUM SERPL-MCNC: 8.2 MG/DL (ref 8.5–10.1)
CALCIUM SERPL-MCNC: 8.7 MG/DL (ref 8.5–10.1)
CHLORIDE SERPL-SCNC: 106 MMOL/L (ref 97–108)
CHLORIDE SERPL-SCNC: 109 MMOL/L (ref 97–108)
CHLORIDE SERPL-SCNC: 94 MMOL/L (ref 97–108)
CO2 SERPL-SCNC: 11 MMOL/L (ref 21–32)
CO2 SERPL-SCNC: 13 MMOL/L (ref 21–32)
CO2 SERPL-SCNC: 14 MMOL/L (ref 21–32)
COLOR UR: ABNORMAL
CREAT SERPL-MCNC: 0.72 MG/DL (ref 0.55–1.02)
CREAT SERPL-MCNC: 0.75 MG/DL (ref 0.55–1.02)
CREAT SERPL-MCNC: 0.84 MG/DL (ref 0.55–1.02)
D DIMER PPP FEU-MCNC: 0.26 MG/L FEU (ref 0–0.65)
D50 ADMINISTERED, D50ADM: 0 ML
D50 ORDER, D50ORD: 0 ML
EOSINOPHIL # BLD: 0.1 K/UL (ref 0–0.4)
EOSINOPHIL NFR BLD: 1 % (ref 0–7)
EPITH CASTS URNS QL MICRO: ABNORMAL /LPF
ERYTHROCYTE [DISTWIDTH] IN BLOOD BY AUTOMATED COUNT: 15.8 % (ref 11.5–14.5)
EST. AVERAGE GLUCOSE BLD GHB EST-MCNC: 286 MG/DL
GLOBULIN SER CALC-MCNC: 4.6 G/DL (ref 2–4)
GLSCOM COMMENTS: NORMAL
GLUCOSE BLD STRIP.AUTO-MCNC: 127 MG/DL (ref 65–100)
GLUCOSE BLD STRIP.AUTO-MCNC: 130 MG/DL (ref 65–100)
GLUCOSE BLD STRIP.AUTO-MCNC: 132 MG/DL (ref 65–100)
GLUCOSE BLD STRIP.AUTO-MCNC: 165 MG/DL (ref 65–100)
GLUCOSE BLD STRIP.AUTO-MCNC: 247 MG/DL (ref 65–100)
GLUCOSE BLD STRIP.AUTO-MCNC: 287 MG/DL (ref 65–100)
GLUCOSE BLD STRIP.AUTO-MCNC: 316 MG/DL (ref 65–100)
GLUCOSE BLD STRIP.AUTO-MCNC: 325 MG/DL (ref 65–100)
GLUCOSE BLD STRIP.AUTO-MCNC: 337 MG/DL (ref 65–100)
GLUCOSE BLD STRIP.AUTO-MCNC: 510 MG/DL (ref 65–100)
GLUCOSE BLD STRIP.AUTO-MCNC: 596 MG/DL (ref 65–100)
GLUCOSE BLD STRIP.AUTO-MCNC: >600 MG/DL (ref 65–100)
GLUCOSE SERPL-MCNC: 117 MG/DL (ref 65–100)
GLUCOSE SERPL-MCNC: 121 MG/DL (ref 65–100)
GLUCOSE SERPL-MCNC: 601 MG/DL (ref 65–100)
GLUCOSE UR STRIP.AUTO-MCNC: >1000 MG/DL
GLUCOSE, GLC: 127 MG/DL
GLUCOSE, GLC: 130 MG/DL
GLUCOSE, GLC: 132 MG/DL
GLUCOSE, GLC: 165 MG/DL
GLUCOSE, GLC: 247 MG/DL
GLUCOSE, GLC: 287 MG/DL
GLUCOSE, GLC: 316 MG/DL
GLUCOSE, GLC: 325 MG/DL
GLUCOSE, GLC: 337 MG/DL
GLUCOSE, GLC: 510 MG/DL
HBA1C MFR BLD: 11.6 % (ref 4.2–6.3)
HCG UR QL: NEGATIVE
HCT VFR BLD AUTO: 38.4 % (ref 35–47)
HGB BLD-MCNC: 12.6 G/DL (ref 11.5–16)
HGB UR QL STRIP: NEGATIVE
HIGH TARGET, HITG: 250 MG/DL
INR PPP: 1 (ref 0.9–1.1)
INSULIN ADMINSTERED, INSADM: 0 UNITS/HOUR
INSULIN ADMINSTERED, INSADM: 0 UNITS/HOUR
INSULIN ADMINSTERED, INSADM: 0.1 UNITS/HOUR
INSULIN ADMINSTERED, INSADM: 0.7 UNITS/HOUR
INSULIN ADMINSTERED, INSADM: 1.9 UNITS/HOUR
INSULIN ADMINSTERED, INSADM: 2.6 UNITS/HOUR
INSULIN ADMINSTERED, INSADM: 2.7 UNITS/HOUR
INSULIN ADMINSTERED, INSADM: 4.5 UNITS/HOUR
INSULIN ADMINSTERED, INSADM: 5.5 UNITS/HOUR
INSULIN ADMINSTERED, INSADM: 9 UNITS/HOUR
INSULIN ORDER, INSORD: 0 UNITS/HOUR
INSULIN ORDER, INSORD: 0 UNITS/HOUR
INSULIN ORDER, INSORD: 0.1 UNITS/HOUR
INSULIN ORDER, INSORD: 0.7 UNITS/HOUR
INSULIN ORDER, INSORD: 1.9 UNITS/HOUR
INSULIN ORDER, INSORD: 2.6 UNITS/HOUR
INSULIN ORDER, INSORD: 2.7 UNITS/HOUR
INSULIN ORDER, INSORD: 4.5 UNITS/HOUR
INSULIN ORDER, INSORD: 5.5 UNITS/HOUR
INSULIN ORDER, INSORD: 9 UNITS/HOUR
KETONES UR QL STRIP.AUTO: 40 MG/DL
LEUKOCYTE ESTERASE UR QL STRIP.AUTO: NEGATIVE
LOW TARGET, LOT: 150 MG/DL
LYMPHOCYTES # BLD: 1.6 K/UL (ref 0.8–3.5)
LYMPHOCYTES NFR BLD: 28 % (ref 12–49)
MAGNESIUM SERPL-MCNC: 1.8 MG/DL (ref 1.6–2.4)
MCH RBC QN AUTO: 27.8 PG (ref 26–34)
MCHC RBC AUTO-ENTMCNC: 32.8 G/DL (ref 30–36.5)
MCV RBC AUTO: 84.8 FL (ref 80–99)
MINUTES UNTIL NEXT BG, NBG: 60 MIN
MONOCYTES # BLD: 0.3 K/UL (ref 0–1)
MONOCYTES NFR BLD: 6 % (ref 5–13)
MULTIPLIER, MUL: 0
MULTIPLIER, MUL: 0.01
MULTIPLIER, MUL: 0.02
NEUTS SEG # BLD: 3.9 K/UL (ref 1.8–8)
NEUTS SEG NFR BLD: 64 % (ref 32–75)
NITRITE UR QL STRIP.AUTO: NEGATIVE
ORDER INITIALS, ORDINIT: NORMAL
PH UR STRIP: 6 [PH] (ref 5–8)
PLATELET # BLD AUTO: 419 K/UL (ref 150–400)
POTASSIUM SERPL-SCNC: 3.6 MMOL/L (ref 3.5–5.1)
POTASSIUM SERPL-SCNC: 4.3 MMOL/L (ref 3.5–5.1)
POTASSIUM SERPL-SCNC: 4.4 MMOL/L (ref 3.5–5.1)
PROT SERPL-MCNC: 7.6 G/DL (ref 6.4–8.2)
PROT UR STRIP-MCNC: NEGATIVE MG/DL
PROTHROMBIN TIME: 9.8 SEC (ref 9–11.1)
RBC # BLD AUTO: 4.53 M/UL (ref 3.8–5.2)
RBC #/AREA URNS HPF: ABNORMAL /HPF (ref 0–5)
SERVICE CMNT-IMP: ABNORMAL
SODIUM SERPL-SCNC: 128 MMOL/L (ref 136–145)
SODIUM SERPL-SCNC: 138 MMOL/L (ref 136–145)
SODIUM SERPL-SCNC: 138 MMOL/L (ref 136–145)
SP GR UR REFRACTOMETRY: 1.01 (ref 1–1.03)
THERAPEUTIC RANGE,PTTT: NORMAL SECS (ref 58–77)
TROPONIN I SERPL-MCNC: <0.04 NG/ML
UA: UC IF INDICATED,UAUC: ABNORMAL
UROBILINOGEN UR QL STRIP.AUTO: 0.2 EU/DL (ref 0.2–1)
WBC # BLD AUTO: 5.9 K/UL (ref 3.6–11)
WBC URNS QL MICRO: ABNORMAL /HPF (ref 0–4)
YEAST URNS QL MICRO: PRESENT

## 2017-11-28 PROCEDURE — 74011000258 HC RX REV CODE- 258: Performed by: INTERNAL MEDICINE

## 2017-11-28 PROCEDURE — 81001 URINALYSIS AUTO W/SCOPE: CPT | Performed by: EMERGENCY MEDICINE

## 2017-11-28 PROCEDURE — 93005 ELECTROCARDIOGRAM TRACING: CPT

## 2017-11-28 PROCEDURE — 84484 ASSAY OF TROPONIN QUANT: CPT | Performed by: EMERGENCY MEDICINE

## 2017-11-28 PROCEDURE — 85379 FIBRIN DEGRADATION QUANT: CPT | Performed by: EMERGENCY MEDICINE

## 2017-11-28 PROCEDURE — 85610 PROTHROMBIN TIME: CPT | Performed by: HOSPITALIST

## 2017-11-28 PROCEDURE — 65660000000 HC RM CCU STEPDOWN

## 2017-11-28 PROCEDURE — 81025 URINE PREGNANCY TEST: CPT

## 2017-11-28 PROCEDURE — 82962 GLUCOSE BLOOD TEST: CPT

## 2017-11-28 PROCEDURE — 80074 ACUTE HEPATITIS PANEL: CPT | Performed by: HOSPITALIST

## 2017-11-28 PROCEDURE — 80048 BASIC METABOLIC PNL TOTAL CA: CPT | Performed by: EMERGENCY MEDICINE

## 2017-11-28 PROCEDURE — 74011000258 HC RX REV CODE- 258: Performed by: EMERGENCY MEDICINE

## 2017-11-28 PROCEDURE — 83735 ASSAY OF MAGNESIUM: CPT | Performed by: EMERGENCY MEDICINE

## 2017-11-28 PROCEDURE — 80053 COMPREHEN METABOLIC PANEL: CPT | Performed by: EMERGENCY MEDICINE

## 2017-11-28 PROCEDURE — 96361 HYDRATE IV INFUSION ADD-ON: CPT

## 2017-11-28 PROCEDURE — 71020 XR CHEST PA LAT: CPT

## 2017-11-28 PROCEDURE — 85025 COMPLETE CBC W/AUTO DIFF WBC: CPT | Performed by: EMERGENCY MEDICINE

## 2017-11-28 PROCEDURE — 74011250636 HC RX REV CODE- 250/636: Performed by: HOSPITALIST

## 2017-11-28 PROCEDURE — 99285 EMERGENCY DEPT VISIT HI MDM: CPT

## 2017-11-28 PROCEDURE — 82140 ASSAY OF AMMONIA: CPT | Performed by: HOSPITALIST

## 2017-11-28 PROCEDURE — 74011636637 HC RX REV CODE- 636/637: Performed by: EMERGENCY MEDICINE

## 2017-11-28 PROCEDURE — 82728 ASSAY OF FERRITIN: CPT | Performed by: HOSPITALIST

## 2017-11-28 PROCEDURE — 96374 THER/PROPH/DIAG INJ IV PUSH: CPT

## 2017-11-28 PROCEDURE — 83036 HEMOGLOBIN GLYCOSYLATED A1C: CPT | Performed by: EMERGENCY MEDICINE

## 2017-11-28 PROCEDURE — 82390 ASSAY OF CERULOPLASMIN: CPT | Performed by: HOSPITALIST

## 2017-11-28 PROCEDURE — 74011250636 HC RX REV CODE- 250/636: Performed by: EMERGENCY MEDICINE

## 2017-11-28 PROCEDURE — 83516 IMMUNOASSAY NONANTIBODY: CPT | Performed by: HOSPITALIST

## 2017-11-28 PROCEDURE — 36415 COLL VENOUS BLD VENIPUNCTURE: CPT | Performed by: HOSPITALIST

## 2017-11-28 PROCEDURE — 85730 THROMBOPLASTIN TIME PARTIAL: CPT | Performed by: HOSPITALIST

## 2017-11-28 RX ORDER — ONDANSETRON 2 MG/ML
4 INJECTION INTRAMUSCULAR; INTRAVENOUS
Status: DISCONTINUED | OUTPATIENT
Start: 2017-11-28 | End: 2017-11-29 | Stop reason: HOSPADM

## 2017-11-28 RX ORDER — ONDANSETRON 2 MG/ML
4 INJECTION INTRAMUSCULAR; INTRAVENOUS
Status: COMPLETED | OUTPATIENT
Start: 2017-11-28 | End: 2017-11-28

## 2017-11-28 RX ORDER — SODIUM CHLORIDE 0.9 % (FLUSH) 0.9 %
5-10 SYRINGE (ML) INJECTION AS NEEDED
Status: DISCONTINUED | OUTPATIENT
Start: 2017-11-28 | End: 2017-11-29 | Stop reason: HOSPADM

## 2017-11-28 RX ORDER — SODIUM CHLORIDE 0.9 % (FLUSH) 0.9 %
5-10 SYRINGE (ML) INJECTION EVERY 8 HOURS
Status: DISCONTINUED | OUTPATIENT
Start: 2017-11-28 | End: 2017-11-29 | Stop reason: HOSPADM

## 2017-11-28 RX ORDER — DEXTROSE MONOHYDRATE AND SODIUM CHLORIDE 5; .9 G/100ML; G/100ML
150 INJECTION, SOLUTION INTRAVENOUS CONTINUOUS
Status: DISCONTINUED | OUTPATIENT
Start: 2017-11-28 | End: 2017-11-29

## 2017-11-28 RX ORDER — INSULIN LISPRO 100 [IU]/ML
INJECTION, SOLUTION INTRAVENOUS; SUBCUTANEOUS
Status: DISCONTINUED | OUTPATIENT
Start: 2017-11-28 | End: 2017-11-28

## 2017-11-28 RX ORDER — SODIUM CHLORIDE 9 MG/ML
150 INJECTION, SOLUTION INTRAVENOUS CONTINUOUS
Status: DISCONTINUED | OUTPATIENT
Start: 2017-11-28 | End: 2017-11-28

## 2017-11-28 RX ORDER — MAGNESIUM SULFATE 100 %
4 CRYSTALS MISCELLANEOUS AS NEEDED
Status: DISCONTINUED | OUTPATIENT
Start: 2017-11-28 | End: 2017-11-29 | Stop reason: HOSPADM

## 2017-11-28 RX ORDER — ENOXAPARIN SODIUM 100 MG/ML
40 INJECTION SUBCUTANEOUS EVERY 24 HOURS
Status: DISCONTINUED | OUTPATIENT
Start: 2017-11-28 | End: 2017-11-29 | Stop reason: HOSPADM

## 2017-11-28 RX ORDER — ACETAMINOPHEN 325 MG/1
650 TABLET ORAL
Status: DISCONTINUED | OUTPATIENT
Start: 2017-11-28 | End: 2017-11-29 | Stop reason: HOSPADM

## 2017-11-28 RX ORDER — DEXTROSE 50 % IN WATER (D50W) INTRAVENOUS SYRINGE
12.5-25 AS NEEDED
Status: DISCONTINUED | OUTPATIENT
Start: 2017-11-28 | End: 2017-11-29 | Stop reason: HOSPADM

## 2017-11-28 RX ADMIN — Medication 10 ML: at 21:48

## 2017-11-28 RX ADMIN — SODIUM CHLORIDE 150 ML/HR: 900 INJECTION, SOLUTION INTRAVENOUS at 14:04

## 2017-11-28 RX ADMIN — SODIUM CHLORIDE 1000 ML: 900 INJECTION, SOLUTION INTRAVENOUS at 13:19

## 2017-11-28 RX ADMIN — ONDANSETRON HYDROCHLORIDE 4 MG: 2 INJECTION, SOLUTION INTRAMUSCULAR; INTRAVENOUS at 11:57

## 2017-11-28 RX ADMIN — Medication 10 ML: at 15:31

## 2017-11-28 RX ADMIN — ENOXAPARIN SODIUM 40 MG: 40 INJECTION SUBCUTANEOUS at 20:47

## 2017-11-28 RX ADMIN — SODIUM CHLORIDE 0.7 UNITS/HR: 900 INJECTION, SOLUTION INTRAVENOUS at 16:35

## 2017-11-28 RX ADMIN — HUMAN INSULIN 7 UNITS: 100 INJECTION, SOLUTION SUBCUTANEOUS at 13:34

## 2017-11-28 RX ADMIN — DEXTROSE MONOHYDRATE AND SODIUM CHLORIDE 150 ML/HR: 5; .9 INJECTION, SOLUTION INTRAVENOUS at 22:52

## 2017-11-28 RX ADMIN — SODIUM CHLORIDE 1000 ML: 900 INJECTION, SOLUTION INTRAVENOUS at 11:56

## 2017-11-28 RX ADMIN — SODIUM CHLORIDE 9 UNITS/HR: 900 INJECTION, SOLUTION INTRAVENOUS at 14:06

## 2017-11-28 NOTE — IP AVS SNAPSHOT
[address] [facility] 
[phone] Patient: Rachel Urrutia MRN: [id number] :1995 My Medications TAKE these medications as instructed Instructions Each Dose to Equal  
 Morning Noon Evening Bedtime LANTUS 100 unit/mL injection Generic drug:  insulin glargine Your last dose was: Your next dose is:    
   
   
 42 Units by SubCUTAneous route daily. 42 Units NovoLOG 100 unit/mL injection Generic drug:  insulin aspart Your last dose was: Your next dose is:    
   
   
 by SubCUTAneous route three (3) times daily (with meals). Patient injects 1 unit for every 8 grams of carbohydrates.

## 2017-11-28 NOTE — ED NOTES
Pt has received inpatient bed assignment however pt hasn't been seen by [facility] in Pharmacy contacted for verification of pt insulin.

## 2017-11-28 NOTE — ED NOTES
TRANSFER - OUT REPORT:    Verbal report given to Regi Burch RN on PCU (name) on Renata Ojeda  being transferred to PCU 2269 (unit) for routine progression of care       Report consisted of patients Situation, Background, Assessment and   Recommendations(SBAR). Information from the following report(s) SBAR, ED Summary, [facility] and Recent Results was reviewed with the receiving nurse. Lines:   Peripheral IV 11/28/17 Left Antecubital (Active)   Site Assessment Clean, dry, & intact 11/28/2017 11:24 AM   Phlebitis Assessment 0 11/28/2017 11:24 AM   Infiltration Assessment 0 11/28/2017 11:24 AM   Dressing Status Clean, dry, & intact 11/28/2017 11:24 AM        Opportunity for questions and clarification was provided.

## 2017-11-28 NOTE — PROGRESS NOTES
Pharmacy Clarification of the Prior to Admission Medication Regimen Retrospective to the Admission Medication Reconciliation    The patient was interviewed regarding clarification of the prior to admission medication regimen, and was questioned regarding use of any other inhalers, topical products, over the counter medications, herbal medications, vitamin products or ophthalmic/nasal/otic medication use. Information Obtained From: Patient    Recommendations/Findings: The following amendments were made to the patient's active medication list on file at [facility]:     1) Additions: NONE    2) Removals: NONE    3) Changes:   insulin aspart (NOVOLOG) 100 unit/mL injection (Old regimen: by SUBQ route /New regimen: 1 unit for every 8 gram of carbohydrates TID)   insulin glargine (LANTUS) 100 unit/mL injection (Old regimen: by SUBQ route QHS /New regimen: 42 units QHS)    4) Pertinent Pharmacy Findings:   Updated patients preferred outpatient pharmacy to: [facility] [address] [place] [address] Dr Tavera, [address] [facility] medication list was corrected to the following:     Prior to Admission Medications   Prescriptions Last Dose Informant Patient Reported? Taking?   insulin aspart (NOVOLOG) 100 unit/mL injection 2017 at Unknown time Self Yes Yes   Sig: by SubCUTAneous route three (3) times daily (with meals). Patient injects 1 unit for every 8 grams of carbohydrates. insulin glargine (LANTUS) 100 unit/mL injection 2017 at Unknown time Self Yes Yes   Si Units by SubCUTAneous route daily.       Facility-Administered Medications: None          Thank you,  Adan George CPhT  Medication History Pharmacy Technician

## 2017-11-28 NOTE — ED PROVIDER NOTES
EMERGENCY DEPARTMENT HISTORY AND PHYSICAL EXAM      Date: 11/28/2017  Patient Name: Roxy Rae    History of Presenting Illness     Chief Complaint   Patient presents with    Shortness of Breath     starting monday    Cough    Vomiting       History Provided By: Patient    HPI: Roxy Rae, 25 y.o. female with PMHx significant for IDDM, presents ambulatory to the ED with cc of progressively worsening SOB and palpitations x 2 days. She describes her palpitations as her heart \"racing. \" She also c/o nausea, 4 episodes of vomiting, 2 episodes of diarrhea, a non-productive cough, and sharp, upper chest pain that started yesterday afternoon. She reports that she has been able to tolerate fluids. She also notes that her BG has been increasing since onset of symptoms. She states that during initial onset of symptoms, her BG was in the high 100s to low 200s, but when she assessed her BG this morning, it was 433. She states that she has a history of DKA, but she has been taking her insulin as prescribed. She denies a history of PE/DVT, being on hormone therapy, recent long travels, recent surgeries, chance of pregnancy, history of prior surgeries, or recent heavy lifting. She specifically denies hematemesis, blood in her stool, abdominal pain, dysuria, hematuria, calf pain, or other complaints at this time.     PCP: None    Current Facility-Administered Medications   Medication Dose Route Frequency Provider Last Rate Last Dose    sodium chloride (NS) flush 5-10 mL  5-10 mL IntraVENous Q8H Timmy Singer MD        sodium chloride (NS) flush 5-10 mL  5-10 mL IntraVENous PRN Timmy Singer MD        insulin regular (NOVOLIN R, HUMULIN R) 100 Units in 0.9% sodium chloride 100 mL infusion  0-50 Units/hr IntraVENous TITRATE Timmy Singer MD        glucose chewable tablet 16 g  4 Tab Oral PRN Timmy Singer MD        dextrose (D50W) injection syrg 12.5-25 g  12.5-25 g IntraVENous PRN Sunday Neola Sami Greene MD        glucagon (GLUCAGEN) injection 1 mg  1 mg IntraMUSCular PRN Truong Marquis MD        sodium chloride (NS) flush 5-10 mL  5-10 mL IntraVENous Q8H Jany Geiger MD        sodium chloride (NS) flush 5-10 mL  5-10 mL IntraVENous PRN Jany Geiger MD        0.9% sodium chloride infusion  150 mL/hr IntraVENous CONTINUOUS Jany Geiger  mL/hr at 11/28/17 1404 150 mL/hr at 11/28/17 1404    acetaminophen (TYLENOL) tablet 650 mg  650 mg Oral Q6H PRN Jany Geiger MD        ondansetron [facility]) injection 4 mg  4 mg IntraVENous Q6H PRN Jany Geiger MD        enoxaparin (LOVENOX) injection 40 mg  40 mg SubCUTAneous Q24H Jany Geiger MD         Current Outpatient Prescriptions   Medication Sig Dispense Refill    insulin aspart (NOVOLOG) 100 unit/mL injection by SubCUTAneous route.  insulin glargine (LANTUS) 100 unit/mL injection by SubCUTAneous route nightly. Past History     Past Medical History:  Past Medical History:   Diagnosis Date    Diabetes [facility])        Past Surgical History:  History reviewed. No pertinent surgical history. Family History:  History reviewed. No pertinent family history. Social History:  Social History   Substance Use Topics    Smoking status: Never Smoker    Smokeless tobacco: Never Used    Alcohol use Yes      Comment: social       Allergies:  No Known Allergies      Review of Systems   Review of Systems   Constitutional: Negative for chills, fatigue and fever. HENT: Negative for congestion, rhinorrhea and sore throat. Eyes: Negative for pain, discharge and visual disturbance. Respiratory: Positive for cough and shortness of breath. Negative for chest tightness and wheezing. Cardiovascular: Positive for chest pain and palpitations. Negative for leg swelling. Gastrointestinal: Positive for diarrhea, nausea and vomiting. Negative for abdominal pain, blood in stool and constipation.         (-) Hematemesis   Genitourinary: Negative for dysuria, frequency and hematuria. Musculoskeletal: Negative for arthralgias, back pain and myalgias. Skin: Negative for rash. Neurological: Negative for dizziness, weakness, light-headedness and headaches. Psychiatric/Behavioral: Negative. Physical Exam   Physical Exam   Constitutional: She is oriented to person, place, and time. She appears well-developed and well-nourished. No distress. HENT:   Head: Normocephalic and atraumatic. Mouth/Throat: Mucous membranes are dry. Eyes: EOM are normal. Right eye exhibits no discharge. Left eye exhibits no discharge. No scleral icterus. Neck: Normal range of motion. Neck supple. No tracheal deviation present. Cardiovascular: Regular rhythm, normal heart sounds and intact distal pulses. Tachycardia present. Exam reveals no gallop and no friction rub. No murmur heard. Pulmonary/Chest: Effort normal and breath sounds normal. No respiratory distress. She has no wheezes. She has no rales. She exhibits tenderness (Anterior chest wall). Abdominal: Soft. She exhibits no distension. There is no tenderness. Musculoskeletal: Normal range of motion. She exhibits no edema. Lymphadenopathy:     She has no cervical adenopathy. Neurological: She is alert and oriented to person, place, and time. No focal neuro deficits   Skin: Skin is warm and dry. No rash noted. Psychiatric: She has a normal mood and affect. Nursing note and vitals reviewed.         Diagnostic Study Results     Labs -     Recent Results (from the past 12 hour(s))   GLUCOSE, POC    Collection Time: 11/28/17 11:02 AM   Result Value Ref Range    Glucose (POC) >600 (HH) 65 - 100 mg/dL    Performed by Sosa Alcaraz    EKG, 12 LEAD, INITIAL    Collection Time: 11/28/17 11:04 AM   Result Value Ref Range    Ventricular Rate 112 BPM    Atrial Rate 112 BPM    P-R Interval 124 ms    QRS Duration 78 ms    Q-T Interval 330 ms    QTC Calculation (Bezet) 450 ms    Calculated P Axis 63 degrees    Calculated R Axis 45 degrees    Calculated T Axis 18 degrees    Diagnosis       Sinus tachycardia  Possible Left atrial enlargement  No previous ECGs available     CBC WITH AUTOMATED DIFF    Collection Time: 11/28/17 11:22 AM   Result Value Ref Range    WBC 5.9 3.6 - 11.0 K/uL    RBC 4.53 3.80 - 5.20 M/uL    HGB 12.6 11.5 - 16.0 g/dL    HCT 38.4 35.0 - 47.0 %    MCV 84.8 80.0 - 99.0 FL    MCH 27.8 26.0 - 34.0 PG    MCHC 32.8 30.0 - 36.5 g/dL    RDW 15.8 (H) 11.5 - 14.5 %    PLATELET 385 (H) 382 - 400 K/uL    NEUTROPHILS 64 32 - 75 %    LYMPHOCYTES 28 12 - 49 %    MONOCYTES 6 5 - 13 %    EOSINOPHILS 1 0 - 7 %    BASOPHILS 1 0 - 1 %    ABS. NEUTROPHILS 3.9 1.8 - 8.0 K/UL    ABS. LYMPHOCYTES 1.6 0.8 - 3.5 K/UL    ABS. MONOCYTES 0.3 0.0 - 1.0 K/UL    ABS. EOSINOPHILS 0.1 0.0 - 0.4 K/UL    ABS. BASOPHILS 0.0 0.0 - 0.1 K/UL   MAGNESIUM    Collection Time: 11/28/17 11:40 AM   Result Value Ref Range    Magnesium 1.8 1.6 - 2.4 mg/dL   TROPONIN I    Collection Time: 11/28/17 11:40 AM   Result Value Ref Range    Troponin-I, Qt. <0.04 <0.05 ng/mL   D DIMER    Collection Time: 11/28/17 11:40 AM   Result Value Ref Range    D-dimer 0.26 0.00 - 0.65 mg/L U   METABOLIC PANEL, COMPREHENSIVE    Collection Time: 11/28/17 11:40 AM   Result Value Ref Range    Sodium 128 (L) 136 - 145 mmol/L    Potassium 4.3 3.5 - 5.1 mmol/L    Chloride 94 (L) 97 - 108 mmol/L    CO2 14 (LL) 21 - 32 mmol/L    Anion gap 20 (H) 5 - 15 mmol/L    Glucose 601 (HH) 65 - 100 mg/dL    BUN 10 6 - 20 MG/DL    Creatinine 0.72 0.55 - 1.02 MG/DL    BUN/Creatinine ratio 14 12 - 20      GFR est AA >60 >60 ml/min/1.73m2    GFR est non-AA >60 >60 ml/min/1.73m2    Calcium 8.7 8.5 - 10.1 MG/DL    Bilirubin, total 0.6 0.2 - 1.0 MG/DL    ALT (SGPT) 101 (H) 12 - 78 U/L    AST (SGOT) 75 (H) 15 - 37 U/L    Alk.  phosphatase 216 (H) 45 - 117 U/L    Protein, total 7.6 6.4 - 8.2 g/dL    Albumin 3.0 (L) 3.5 - 5.0 g/dL    Globulin 4.6 (H) 2.0 - 4.0 g/dL    A-G Ratio 0.7 (L) 1.1 - 2.2     URINALYSIS W/ REFLEX CULTURE    Collection Time: 11/28/17 11:55 AM   Result Value Ref Range    Color YELLOW/STRAW      Appearance CLEAR CLEAR      Specific gravity 1.010 1.003 - 1.030      pH (UA) 6.0 5.0 - 8.0      Protein NEGATIVE  NEG mg/dL    Glucose >1000 (A) NEG mg/dL    Ketone 40 (A) NEG mg/dL    Bilirubin NEGATIVE  NEG      Blood NEGATIVE  NEG      Urobilinogen 0.2 0.2 - 1.0 EU/dL    Nitrites NEGATIVE  NEG      Leukocyte Esterase NEGATIVE  NEG      WBC 0-4 0 - 4 /hpf    RBC 0-5 0 - 5 /hpf    Epithelial cells FEW FEW /lpf    Bacteria NEGATIVE  NEG /hpf    UA:UC IF INDICATED CULTURE NOT INDICATED BY UA RESULT CNI      Yeast PRESENT (A) NEG     HCG URINE, QL. - POC    Collection Time: 11/28/17 11:56 AM   Result Value Ref Range    Pregnancy test,urine (POC) NEGATIVE  NEG     GLUCOSE, POC    Collection Time: 11/28/17 12:55 PM   Result Value Ref Range    Glucose (POC) 596 (H) 65 - 100 mg/dL    Performed by Enrique Kothari        Radiologic Studies -   CXR Results  (Last 48 hours)               11/28/17 1314  XR CHEST PA LAT Final result    Impression:  IMPRESSION: No acute abnormality                       Narrative:  EXAM:  XR CHEST PA LAT       INDICATION:   chest pain and dyspnea x 3 days       COMPARISON: None. FINDINGS: PA and lateral radiographs of the chest demonstrate clear lungs. The   cardiac and mediastinal contours and pulmonary vascularity are normal.  The   bones and soft tissues are within normal limits. Medical Decision Making   I am the first provider for this patient. I reviewed the vital signs, available nursing notes, past medical history, past surgical history, family history and social history. Vital Signs-Reviewed the patient's vital signs.   Patient Vitals for the past 12 hrs:   Temp Pulse Resp BP SpO2   11/28/17 1355 - (!) 110 18 148/83 100 %   11/28/17 1318 - (!) 120 21 - 100 %   11/28/17 1300 - (!) 120 17 141/82 100 %   11/28/17 1200 - (!) 107 17 139/90 99 %   11/28/17 1058 98 °F (36.7 °C) (!) 114 20 (!) 137/92 98 %       EKG interpretation: (Preliminary) 11:04  Rhythm: sinus tachycardia; and regular . Rate (approx.): 112; Axis: normal; [place] interval: normal; QRS interval: normal ; ST/T wave: normal; Other findings: normal.  Written by LAQUITA Timmonsibe, as dictated by Truong Marquis MD.    Records Reviewed: Nursing Notes and Old Medical Records    Provider Notes (Medical Decision Making):   Patient presents to ED with n/v/d. She also reports chest pain that is reproducible on exam - likely MSK vs costochondritis. Differential includes DKA, hyperglycemia, dehydration, electrolyte abnormality, MARTINEZ, UTI, pneumonia, MSK chest pain, costochondritis, pleurisy, PE, myocarditis, pregnancy. - CBC, CMP, Mg, troponin, UA, UPT  - D-dimer to risk stratify for PE  - CXR  - IVF    Disposition: Labs consistent with DKA. Will start insulin drip, continue IVF and admit for further management. ED Course:   Initial assessment performed. The patients presenting problems have been discussed, and they are in agreement with the care plan formulated and outlined with them. I have encouraged them to ask questions as they arise throughout their visit. Progress Note:  12:52 PM  Per nursing staff, the patient's BG is 601, will order 7 units of insulin. The patient is currently in DKA. Will consult hospitalist for admission. Written by LAQUITA Timmonsibe, as dictated by Truong Marquis MD.    CONSULT NOTE:   1:04 Roselyn Valente MD spoke with Jany Geiger MD,   Specialty: Hospitalist  Discussed pt's hx, disposition, and available diagnostic and imaging results. Reviewed care plans. Consultant will evaluate pt for admission. Written by LAQUITA Timmonsibe, as dictated by Truong Marquis MD.    Progress Note:  1:10 PM  The patient was updated on all of her available results. She conveys her understanding of these results.  She is in agreement with the plan for admission. Written by Seun Her, ED Scribe, as dictated by Doris Varela MD.      CRITICAL CARE NOTE :  12:59 PM  IMPENDING DETERIORATION -Metabolic  ASSOCIATED RISK FACTORS - Metabolic changes and DKA  MANAGEMENT- Bedside Assessment and Supervision of Care  INTERPRETATION -  ECG, Blood Pressure and labs and vital signs  INTERVENTIONS - IV fluids, insulin drip  CASE REVIEW - Hospitalist and Nursing  TREATMENT RESPONSE -Stable  PERFORMED BY - Self    NOTES   :  I have spent 45 minutes of critical care time involved in lab review, consultations with specialist, family decision- making, bedside attention and documentation. During this entire length of time I was immediately available to the patient . Admit Note:  1:04 PM  Pt is being admitted by Natacha Kumar MD. The results of their tests and reason(s) for their admission have been discussed with pt and/or available family. They convey agreement and understanding for the need to be admitted and for admission diagnosis. Diagnosis     Clinical Impression:   1. Diabetic ketoacidosis without coma associated with type 1 diabetes mellitus ([facility] [address]        Attestations: This note is prepared by Seun Her, acting as a Scribe for Doris Varela MD.    Doris Varela MD: The scribe's documentation has been prepared under my direction and personally reviewed by me in its entirety. I confirm that the notes above accurately reflects all work, treatment, procedures, and medical decision making performed by me. This note will not be viewable in [address]

## 2017-11-28 NOTE — DIABETES MGMT
DTC Progress Note    Consult received for assessment of home management. Pt admitted today with DKA and currently on insulin gtt. Recommend continuing insulin gtt until anion gap less than 12 x2 consecutive blood draws then resume home regimen. Insulin gtt should be continued for 2hrs after administration of lantus dose. Discontinue D5 IVF when insulin gtt discontinued. DTC to follow up with patient at a later time. Chart reviewed on [facility]. A1c:   No results found for: HBA1C, HGBE8, [id number]    Recent Glucose Results:   Lab Results   Component Value Date/Time     (HH) 11/28/2017 11:40 AM    GLUCPOC 596 (H) 11/28/2017 12:55 PM    GLUCPOC >600 ([place]) 11/28/2017 11:02 AM        Lab Results   Component Value Date/Time    Creatinine 0.72 11/28/2017 11:40 AM     Estimated Creatinine Clearance: 125.1 mL/min (based on Cr of 0.72). Active Orders   Diet    DIET NPO With Ice Chips        PO intake: No data found. Current hospital DM medication: insulin gtt    Will continue to follow as needed. Thank you.       Lizette Jarrett, 67 [address], [address]  Office:  [phone]

## 2017-11-28 NOTE — H&P
Hospitalist Admission Note    NAME: Renata Ojeda   :  1995   MRN:  [id number]     Date/Time:  2017 1:08 PM    Patient PCP: None  ______________________________________________________________________   Assessment & Plan:  DKA in DM type 1  --unclear trigger. No significant infection, has yeast in urine but no symptoms of uti  --check A1c. DTC consult to assess home management. [employer], IVF. Check BMP, mag, phos q6h    Elevated liver enzymes, chronic  Hepatomegaly on CT abdomen 10/2017  --check viral hepatitis, ceruloplasmin, mitochondria antibody, anti-smooth, ferritin, coags, ammonia. Consult GI    Vaginal yeast infection  --diflucan x 1    Need to establish pcp  -- consulted  Body mass index is 25.37 kg/(m^2). Code: full  DVT prophylaxis: lovenox  Surrogate decision maker:  parents        Subjective:   CHIEF COMPLAINT:  SOB, n/v    HISTORY OF PRESENT ILLNESS:     Renata Ojeda is a 25 y.o.  female with type 1 DM who developed SOB on  night with palpitations, chest discomfort, occasional nonproductive cough, chills. This caused her to be nauseated and then led to vomiting which caused her blood sugar to rise. PO intake been poor past 2 days, ate only soup yesterday. Still been taking her insulin but blood sugar continues to rise. This morning BS was in 400s and took Lantus 42 units along with 10 units novolog. Report hx of DKA in past whenever she comes down with flu or pneumonia, usually 1-2x/ year. Last hospitalized for DKA about a year ago at [facility]. Work up in [facility] shows [address] with [address] 20. Denies any sick contact, no travel. Denies illicit drug. Drink at most 6 drinks/week. Was seen in ER 10/2017 with RLQ abdominal pain and CT abdomen showed \"Normal-appearing appendix. 2. Right lower quadrant stranding associated with 2 ovarian follicles which maybe due to follicular rupture. 3. Hepatomegaly. \"  LFTs were elevated AST 70, ALT 85, ALK phos 224. T. Bili normal 0.6. She denies personal or Fhx of liver disease or abnormal LFTs. No hx transfusion. +tattoo. We were asked to admit for work up and evaluation of the above problems. Past Medical History:   Diagnosis Date    Diabetes ([address] type 1 x 12 years     DKA  Pneumonia    History reviewed. No pertinent surgical history. Social History   Substance Use Topics    Smoking status: Never Smoker    Smokeless tobacco: Never Used    Alcohol use Yes      Comment: social    Drug use:  Denies  Lives with boyfriend. Work as . Move from [place]. Family History   Problem Relation Age of Onset    Other Mother      Factor V Leiden with hx clots    Hypertension Mother     Elevated Lipids Mother     Lupus Father       No Known Allergies     Prior to Admission medications    Medication Sig Start Date End Date Taking? Authorizing Provider   insulin aspart (NOVOLOG) 100 unit/mL injection by SubCUTAneous route. Yes Phys Other, MD   insulin glargine (LANTUS) 100 unit/mL injection by SubCUTAneous route nightly. Yes Phys Other, MD     REVIEW OF SYSTEMS:  POSITIVE= Bold.   Negative = normal text  General:  fever, chills, sweats, generalized weakness, weight loss/gain, loss of appetite  Eyes:  blurred vision, eye pain, loss of vision, diplopia  Ear Nose and Throat:  rhinorrhea, pharyngitis  Respiratory:   cough, sputum production, SOB, wheezing, CHOE, pleuritic pain  Cardiology:  chest pain, palpitations, orthopnea, PND, edema, syncope   Gastrointestinal:  abdominal pain, N/V, dysphagia, diarrhea, constipation, bleeding  Genitourinary:  frequency, urgency, dysuria, hematuria, incontinence  Muskuloskeletal :  arthralgia, myalgia  Hematology:  easy bruising, bleeding, lymphadenopathy  Dermatological:  rash, ulceration, pruritis  Endocrine:  hot flashes or polydipsia  Neurological:  headache, dizziness, confusion, focal weakness, paresthesia, memory loss, gait disturbance  Psychological: anxiety, depression, agitation      Objective:   VITALS:    Visit Vitals    /82 (BP 1 Location: Right arm, BP Patient Position: At rest)    Pulse (!) 120    Temp 98 °F (36.7 °C)    Resp 17    Ht 5' 6\" (1.676 m)    Wt 71.3 kg (157 lb 3 oz)    SpO2 100%    BMI 25.37 kg/m2     Temp (24hrs), Av °F (36.7 °C), Min:98 °F (36.7 °C), Max:98 °F (36.7 °C)    Body mass index is 25.37 kg/(m^2). PHYSICAL EXAM:    General:    Alert, cooperative, no distress, appears stated age. HEENT: Atraumatic, anicteric sclerae, pink conjunctivae     No oral ulcers, mucosa dry, throat clear. Hearing intact. Neck:  Supple, symmetrical,  thyroid: non tender  Lungs:   Clear to auscultation bilaterally. No Wheezing or Rhonchi. No rales. Chest wall:  No tenderness  No Accessory muscle use. Heart:   Regular  rhythm, tachy, hyperdynamic precordium, No  murmur   No gallop. No edema. Abdomen:   Soft, non-tender. Not distended. Bowel sounds normal. No masses  Extremities: No cyanosis. No clubbing  Skin:     Not pale Not Jaundiced Birthmark in right ankle that looks like eczema patch, small brown linear scars on hands. Psych:  Good insight. Not depressed. Not anxious or agitated. Neurologic: EOMs intact. No facial asymmetry. No aphasia or slurred speech. Symmetrical strength, Alert and oriented X 3.      IMAGING RESULTS:   []       I have personally reviewed the actual   []     CXR  []     CT scan  CXR:  CT :  EKG: sinus tach, possible LAE o/w normal   ________________________________________________________________________  Care Plan discussed with:    Comments   Patient y    [facility]:      ________________________________________________________________________  Prophylaxis:  GI none   DVT lovenox   ________________________________________________________________________  Recommended Disposition:   Home with Family y   HH/PT/OT/RN SNF/LTC    MARTIN    ________________________________________________________________________  Code Status:  Full Code y   DNR/DNI    ________________________________________________________________________  TOTAL TIME:  50 minutes      Comments    y Reviewed previous records       ______________________________________________________________________  Karissa Pinedo MD      Procedures: see electronic medical records for all procedures/Xrays and details which were not copied into this note but were reviewed prior to creation of Plan. LAB DATA REVIEWED:    Recent Results (from the past 24 hour(s))   GLUCOSE, POC    Collection Time: 11/28/17 11:02 AM   Result Value Ref Range    Glucose (POC) >600 (HH) 65 - 100 mg/dL    Performed by Сергей Mcmillan    EKG, 12 LEAD, INITIAL    Collection Time: 11/28/17 11:04 AM   Result Value Ref Range    Ventricular Rate 112 BPM    Atrial Rate 112 BPM    P-R Interval 124 ms    QRS Duration 78 ms    Q-T Interval 330 ms    QTC Calculation (Bezet) 450 ms    Calculated P Axis 63 degrees    Calculated R Axis 45 degrees    Calculated T Axis 18 degrees    Diagnosis       Sinus tachycardia  Possible Left atrial enlargement  No previous ECGs available     CBC WITH AUTOMATED DIFF    Collection Time: 11/28/17 11:22 AM   Result Value Ref Range    WBC 5.9 3.6 - 11.0 K/uL    RBC 4.53 3.80 - 5.20 M/uL    HGB 12.6 11.5 - 16.0 g/dL    HCT 38.4 35.0 - 47.0 %    MCV 84.8 80.0 - 99.0 FL    MCH 27.8 26.0 - 34.0 PG    MCHC 32.8 30.0 - 36.5 g/dL    RDW 15.8 (H) 11.5 - 14.5 %    PLATELET 162 (H) 944 - 400 K/uL    NEUTROPHILS 64 32 - 75 %    LYMPHOCYTES 28 12 - 49 %    MONOCYTES 6 5 - 13 %    EOSINOPHILS 1 0 - 7 %    BASOPHILS 1 0 - 1 %    ABS. NEUTROPHILS 3.9 1.8 - 8.0 K/UL    ABS. LYMPHOCYTES 1.6 0.8 - 3.5 K/UL    ABS. MONOCYTES 0.3 0.0 - 1.0 K/UL    ABS. EOSINOPHILS 0.1 0.0 - 0.4 K/UL    ABS.  BASOPHILS 0.0 0.0 - 0.1 K/UL   MAGNESIUM    Collection Time: 11/28/17 11:40 AM   Result Value Ref Range Magnesium 1.8 1.6 - 2.4 mg/dL   TROPONIN I    Collection Time: 11/28/17 11:40 AM   Result Value Ref Range    Troponin-I, Qt. <0.04 <0.05 ng/mL   D DIMER    Collection Time: 11/28/17 11:40 AM   Result Value Ref Range    D-dimer 0.26 0.00 - 0.65 mg/L FEU   METABOLIC PANEL, COMPREHENSIVE    Collection Time: 11/28/17 11:40 AM   Result Value Ref Range    Sodium 128 (L) 136 - 145 mmol/L    Potassium 4.3 3.5 - 5.1 mmol/L    Chloride 94 (L) 97 - 108 mmol/L    CO2 14 (LL) 21 - 32 mmol/L    Anion gap 20 (H) 5 - 15 mmol/L    Glucose 601 (HH) 65 - 100 mg/dL    BUN 10 6 - 20 MG/DL    Creatinine 0.72 0.55 - 1.02 MG/DL    BUN/Creatinine ratio 14 12 - 20      GFR est AA >60 >60 ml/min/1.73m2    GFR est non-AA >60 >60 ml/min/1.73m2    Calcium PENDING MG/DL    Bilirubin, total PENDING MG/DL    ALT (SGPT) PENDING U/L    AST (SGOT) PENDING U/L    Alk.  phosphatase PENDING U/L    Protein, total PENDING g/dL    Albumin PENDING g/dL    Globulin PENDING g/dL    A-G Ratio PENDING     URINALYSIS W/ REFLEX CULTURE    Collection Time: 11/28/17 11:55 AM   Result Value Ref Range    Color YELLOW/STRAW      Appearance CLEAR CLEAR      Specific gravity 1.010 1.003 - 1.030      pH (UA) 6.0 5.0 - 8.0      Protein NEGATIVE  NEG mg/dL    Glucose >1000 (A) NEG mg/dL    Ketone 40 (A) NEG mg/dL    Bilirubin NEGATIVE  NEG      Blood NEGATIVE  NEG      Urobilinogen 0.2 0.2 - 1.0 EU/dL    Nitrites NEGATIVE  NEG      Leukocyte Esterase NEGATIVE  NEG      WBC PENDING /hpf    RBC PENDING /hpf    Epithelial cells PENDING /lpf    Bacteria PENDING /hpf    UA:UC IF INDICATED PENDING    HCG URINE, QL. - POC    Collection Time: 11/28/17 11:56 AM   Result Value Ref Range    Pregnancy test,urine (POC) NEGATIVE  NEG     GLUCOSE, POC    Collection Time: 11/28/17 12:55 PM   Result Value Ref Range    Glucose (POC) 596 (H) 65 - 100 mg/dL    Performed by Aranza Lai

## 2017-11-28 NOTE — ED NOTES
Attempted to call report to inpatient unit Left name and extension for receiving RN to call back. Spoke with [place]

## 2017-11-28 NOTE — ED NOTES
Pt complains of cough and SOB x yesterday morning Pt states her SOB is causing her to have nausea and vomiting which is causing her sugar to rise. Pt with hx of diabetes States she last took Insulin approx 1 hr PTA.  Pt arrives alert oriented x 4 Skin warm dry intact Pt placed on monitor x 3 Updated on plan with pending evaluation by MD.

## 2017-11-28 NOTE — CONSULTS
GI Consultation Note Jolinda Sport)    NAME: Adi Harris : 1995 MRN: [id number]   ATTG:  Prashant Amaya MD PCP: None  Date/Time:  2017 5:41 PM  Subjective:   REASON FOR CONSULT:      Yelena Woodard is a 25 y.o.  female with Type 1 IDDM, who I was asked to see for hepatomegaly and elevated liver enzymes. She was admitted today via ER where she presented with 2d hx increasing SOB and palpitation, associated with later development of N/V, and diarrhea. She was found to have elevated BG >400 and notes hx of DKA occurring approximately 2x/yr in association with viral illnesses. She denies new medications, recent travel, abdominal pain herbal remedies, but notes her accompanying boy friend had a viral illness last week. She denied melena, BRBPR, hematochezia, or hematemesis. There is no reported family hx of liver disease. She denies use of illicit substances or IVDA. She denies jaundice, dark urine, or yellowing of eyes. Eval here noted nl Bilirubin 10/24/17 and now but transaminase then and now AST/ALT 85/70 and 101/75 with mildly elevated Alk Phos 224 and 216. Her UA showed no increased bilirubin. CT done 10/24/17 for evaluation of lower abdominal pain attributed to two right ovarian follicles demonstrated enlarged liver. Past Medical History:   Diagnosis Date    Diabetes [facility])       History reviewed. No pertinent surgical history. Social History   Substance Use Topics    Smoking status: Never Smoker    Smokeless tobacco: Never Used    Alcohol use Yes      Comment: social      History reviewed. No pertinent family history. No Known Allergies   Home Medications:  Prior to Admission Medications   Prescriptions Last Dose Informant Patient Reported? Taking?   insulin aspart (NOVOLOG) 100 unit/mL injection 2017 at Unknown time Self Yes Yes   Sig: by SubCUTAneous route three (3) times daily (with meals). Patient injects 1 unit for every 8 grams of carbohydrates.    insulin glargine (LANTUS) 100 unit/mL injection 2017 at Unknown time Self Yes Yes   Si Units by SubCUTAneous route daily. Facility-Administered Medications: None     Hospital medications:  Current Facility-Administered Medications   Medication Dose Route Frequency    sodium chloride (NS) flush 5-10 mL  5-10 mL IntraVENous Q8H    sodium chloride (NS) flush 5-10 mL  5-10 mL IntraVENous PRN    insulin regular (NOVOLIN R, HUMULIN R) 100 Units in 0.9% sodium chloride 100 mL infusion  0-50 Units/hr IntraVENous TITRATE    glucose chewable tablet 16 g  4 Tab Oral PRN    dextrose (D50W) injection syrg 12.5-25 g  12.5-25 g IntraVENous PRN    glucagon (GLUCAGEN) injection 1 mg  1 mg IntraMUSCular PRN    sodium chloride (NS) flush 5-10 mL  5-10 mL IntraVENous Q8H    sodium chloride (NS) flush 5-10 mL  5-10 mL IntraVENous PRN    0.9% sodium chloride infusion  150 mL/hr IntraVENous CONTINUOUS    acetaminophen (TYLENOL) tablet 650 mg  650 mg Oral Q6H PRN    ondansetron (ZOFRAN) injection 4 mg  4 mg IntraVENous Q6H PRN    enoxaparin (LOVENOX) injection 40 mg  40 mg SubCUTAneous Q24H     REVIEW OF SYSTEMS:     [x]    Total of 11 systems reviewed as follows:  Const:   negative fever, negative chills, negative weight loss  Eyes:   negative diplopia or visual changes, negative eye pain  ENT:   negative coryza, negative sore throat  Resp:   negative cough, hemoptysis, dyspnea  Cards:  negative for chest pain, palpitations, lower extremity edema  :  negative for frequency, dysuria and hematuria  Skin:   negative for rash and pruritus  Heme:   negative for easy bruising and gum/nose bleeding  MS:  negative for myalgias, arthralgias, back pain and muscle weakness  Neurolo:  negative for headaches, dizziness, vertigo, memory problems   Psych:  negative for feelings of anxiety, depression     Pertinent Positives include :    Objective:   VITALS:    Visit Vitals    /83 (BP 1 Location: Right arm, BP Patient Position:  At rest)    Pulse (!) 110    Temp 98 °F (36.7 °C)    Resp 18    Ht 5' 6\" (1.676 m)    Wt 71.3 kg (157 lb 3 oz)    SpO2 100%    Breastfeeding No    BMI 25.37 kg/m2     Temp (24hrs), Av °F (36.7 °C), Min:98 °F (36.7 °C), Max:98 °F (36.7 °C)    PHYSICAL EXAM:   General:    Alert, cooperative, no distress, appears stated age. Head:   Normocephalic, without obvious abnormality, atraumatic. Eyes:   Conjunctivae clear, anicteric sclerae. Pupils are equal  Nose:  Nares normal. No drainage or sinus tenderness. Throat:    Lips, mucosa, and tongue normal.  No Thrush  Neck:  Supple, symmetrical,  no adenopathy, thyroid: non tender  Back:    Symmetric,  No CVA tenderness. Lungs:   CTA bilaterally. No wheezing/rhonchi/rales. Chest wall:  No tenderness or deformity. No Accessory muscle use. Heart:   Regular rate and rhythm,  no murmur, rub or gallop. Abdomen:   Soft, non-tender. Not distended. NABS. No masses  Extremities: Atraumatic, No cyanosis. No edema. No clubbing  Skin:     Texture, turgor normal. No rashes/lesions/jaundice  Lymph: Cervical, supraclavicular normal.  Psych:  Good insight. Not depressed. Not anxious or agitated. Neurologic: EOMs intact. Normal  strength, A/O X 3.      LAB DATA REVIEWED:    Recent Results (from the past 48 hour(s))   GLUCOSE, POC    Collection Time: 17 11:02 AM   Result Value Ref Range    Glucose (POC) >600 (HH) 65 - 100 mg/dL    Performed by Linda Titus    EKG, 12 LEAD, INITIAL    Collection Time: 17 11:04 AM   Result Value Ref Range    Ventricular Rate 112 BPM    Atrial Rate 112 BPM    P-R Interval 124 ms    QRS Duration 78 ms    Q-T Interval 330 ms    QTC Calculation (Bezet) 450 ms    Calculated P Axis 63 degrees    Calculated R Axis 45 degrees    Calculated T Axis 18 degrees    Diagnosis       Sinus tachycardia  Possible Left atrial enlargement  No previous ECGs available     CBC WITH AUTOMATED DIFF    Collection Time: 17 11:22 AM   Result Value Ref Range    WBC 5.9 3.6 - 11.0 K/uL    RBC 4.53 3.80 - 5.20 M/uL    HGB 12.6 11.5 - 16.0 g/dL    HCT 38.4 35.0 - 47.0 %    MCV 84.8 80.0 - 99.0 FL    MCH 27.8 26.0 - 34.0 PG    MCHC 32.8 30.0 - 36.5 g/dL    RDW 15.8 (H) 11.5 - 14.5 %    PLATELET 252 (H) 611 - 400 K/uL    NEUTROPHILS 64 32 - 75 %    LYMPHOCYTES 28 12 - 49 %    MONOCYTES 6 5 - 13 %    EOSINOPHILS 1 0 - 7 %    BASOPHILS 1 0 - 1 %    ABS. NEUTROPHILS 3.9 1.8 - 8.0 K/UL    ABS. LYMPHOCYTES 1.6 0.8 - 3.5 K/UL    ABS. MONOCYTES 0.3 0.0 - 1.0 K/UL    ABS. EOSINOPHILS 0.1 0.0 - 0.4 K/UL    ABS. BASOPHILS 0.0 0.0 - 0.1 K/UL   MAGNESIUM    Collection Time: 11/28/17 11:40 AM   Result Value Ref Range    Magnesium 1.8 1.6 - 2.4 mg/dL   TROPONIN I    Collection Time: 11/28/17 11:40 AM   Result Value Ref Range    Troponin-I, Qt. <0.04 <0.05 ng/mL   D DIMER    Collection Time: 11/28/17 11:40 AM   Result Value Ref Range    D-dimer 0.26 0.00 - 0.65 mg/L FEU   METABOLIC PANEL, COMPREHENSIVE    Collection Time: 11/28/17 11:40 AM   Result Value Ref Range    Sodium 128 (L) 136 - 145 mmol/L    Potassium 4.3 3.5 - 5.1 mmol/L    Chloride 94 (L) 97 - 108 mmol/L    CO2 14 (LL) 21 - 32 mmol/L    Anion gap 20 (H) 5 - 15 mmol/L    Glucose 601 (HH) 65 - 100 mg/dL    BUN 10 6 - 20 MG/DL    Creatinine 0.72 0.55 - 1.02 MG/DL    BUN/Creatinine ratio 14 12 - 20      GFR est AA >60 >60 ml/min/1.73m2    GFR est non-AA >60 >60 ml/min/1.73m2    Calcium 8.7 8.5 - 10.1 MG/DL    Bilirubin, total 0.6 0.2 - 1.0 MG/DL    ALT (SGPT) 101 (H) 12 - 78 U/L    AST (SGOT) 75 (H) 15 - 37 U/L    Alk.  phosphatase 216 (H) 45 - 117 U/L    Protein, total 7.6 6.4 - 8.2 g/dL    Albumin 3.0 (L) 3.5 - 5.0 g/dL    Globulin 4.6 (H) 2.0 - 4.0 g/dL    A-G Ratio 0.7 (L) 1.1 - 2.2     URINALYSIS W/ REFLEX CULTURE    Collection Time: 11/28/17 11:55 AM   Result Value Ref Range    Color YELLOW/STRAW      Appearance CLEAR CLEAR      Specific gravity 1.010 1.003 - 1.030      pH (UA) 6.0 5.0 - 8.0      Protein NEGATIVE NEG mg/dL    Glucose >1000 (A) NEG mg/dL    Ketone 40 (A) NEG mg/dL    Bilirubin NEGATIVE  NEG      Blood NEGATIVE  NEG      Urobilinogen 0.2 0.2 - 1.0 EU/dL    Nitrites NEGATIVE  NEG      Leukocyte Esterase NEGATIVE  NEG      WBC 0-4 0 - 4 /hpf    RBC 0-5 0 - 5 /hpf    Epithelial cells FEW FEW /lpf    Bacteria NEGATIVE  NEG /hpf    UA:UC IF INDICATED CULTURE NOT INDICATED BY UA RESULT CNI      Yeast PRESENT (A) NEG     HCG URINE, QL. - POC    Collection Time: 11/28/17 11:56 AM   Result Value Ref Range    Pregnancy test,urine (POC) NEGATIVE  NEG     GLUCOSE, POC    Collection Time: 11/28/17 12:55 PM   Result Value Ref Range    Glucose (POC) 596 (H) 65 - 100 mg/dL    Performed by iTmmy Hernandez    GLUCOSE, POC    Collection Time: 11/28/17  2:05 PM   Result Value Ref Range    Glucose (POC) 510 (H) 65 - 100 mg/dL    Performed by Margarito Farley    Collection Time: 11/28/17  2:06 PM   Result Value Ref Range    Glucose 510 mg/dL    Insulin order 9.0 units/hour    Insulin adminstered 9.0 units/hour    Multiplier 0.020     Low target 150 mg/dL    High target 250 mg/dL    D50 order 0.0 ml    D50 administered 0.00 ml    Minutes until next BG 60 min    Order initials jtq     Administered initials jtq     GLSCOM Comments     GLUCOSE, POC    Collection Time: 11/28/17  3:07 PM   Result Value Ref Range    Glucose (POC) 287 (H) 65 - 100 mg/dL    Performed by Carla Lowe    Collection Time: 11/28/17  3:29 PM   Result Value Ref Range    Glucose 287 mg/dL    Insulin order 4.5 units/hour    Insulin adminstered 4.5 units/hour    Multiplier 0.020     Low target 150 mg/dL    High target 250 mg/dL    D50 order 0.0 ml    D50 administered 0.00 ml    Minutes until next BG 60 min    Order initials LAS     Administered initials LAS     GLSCOM Comments     GLUCOSE, POC    Collection Time: 11/28/17  4:33 PM   Result Value Ref Range    Glucose (POC) 127 (H) 65 - 100 mg/dL    Performed by Simran Menendez GLUCOSTABILIZER    Collection Time: 11/28/17  4:34 PM   Result Value Ref Range    Glucose 127 mg/dL    Insulin order 0.7 units/hour    Insulin adminstered 0.7 units/hour    Multiplier 0.010     Low target 150 mg/dL    High target 250 mg/dL    D50 order 0.0 ml    D50 administered 0.00 ml    Minutes until next BG 60 min    Order initials LAS     Administered initials LAS     GLSCOM Comments     GLUCOSTABILIZER    Collection Time: 11/28/17  5:39 PM   Result Value Ref Range    Glucose 130 mg/dL    Insulin order 0.0 units/hour    Insulin adminstered 0.0 units/hour    Multiplier 0.000     Low target 150 mg/dL    High target 250 mg/dL    D50 order 0.0 ml    D50 administered 0.00 ml    Minutes until next BG 60 min    Order initials LAS     Administered initials LAS     GLSCOM Comments       IMAGING RESULTS:   [x]      I have personally reviewed the actual   []    CXR  [x]    CT  []     US  CT ABD PELV W CONT 10/24/17  FINDINGS:   LUNG BASES: Clear. INCIDENTALLY IMAGED HEART AND MEDIASTINUM: Unremarkable. LIVER: Enlarged. GALLBLADDER: Unremarkable. SPLEEN: No mass. PANCREAS: No mass or ductal dilatation. ADRENALS: Unremarkable. KIDNEYS: No mass, calculus, or hydronephrosis. STOMACH: Unremarkable. SMALL BOWEL: No dilatation or wall thickening. COLON: No dilatation or wall thickening. APPENDIX: Unremarkable. PERITONEUM: Stranding in the right lower quadrant pelvic free fluid. RETROPERITONEUM: No lymphadenopathy or aortic aneurysm. REPRODUCTIVE ORGANS: 2 right ovarian follicles. URINARY BLADDER: No mass or calculus. BONES: No destructive bone lesion. IMPRESSION:  1. Normal-appearing appendix. 2. Right lower quadrant stranding associated with 2 ovarian follicles which may  be due to follicular rupture.   3. Hepatomegaly.     Recommendations/Plan:      Active Problems:    DKA (diabetic ketoacidoses) ([facility] [address] (11/28/2017)       ___________________________________________________  RECOMMENDATIONS:    8TM F with DKA with persistent mild elevation of transaminases without evidence of biliary obstruction. Suspect finding is related to hepatic steatosis in setting of long-standing DM and possibly exacerbated by acute infection. Her nausea and emesis are most c/w DKA and modestly improved at this time. Less likely is this representative of acute viral hepatitis or inherited metabolic liver disease such as AIH, A-1-AT, Adalid Dz, PBC, or hemochromatosis.   Plan:  1) IVF and optimization of BG status  2) PRN ondansetron  3) Encourage PO  4) Check acute viral hepatitis serologies, AMA, Ceruloplasmin, ASMA, WALTER, ferritin  5) Follow LFTs in AM    Discussed Code Status:    [x]    Full Code      []    DNR    ___________________________________________________  Care Plan discussed with:    [x]    Patient   []    Family   [x]    Nursing   []    Attending  Total Time :  50  minutes   ___________________________________________________  GI: William Kohler MD

## 2017-11-28 NOTE — IP AVS SNAPSHOT
[address] [facility] 
[phone] Patient: Arnulfo Padron MRN: [id number] :1995 About your hospitalization You were admitted on:  2017 You last received care in the:  [facility] 2 PROGRESSIVE CARE You were discharged on:  2017 Why you were hospitalized Your primary diagnosis was:  Not on File Your diagnoses also included:  Dka (Diabetic Ketoacidoses) (Hcc) Things You Need To Do (next 8 weeks) Follow up with Samantha Scott MD  
Gastroenterology - The nurse will call you to schedule this appointment Phone:  [phone] Where:  [address] [place] [address], [facility], [address] [address] Monday Dec 04, 2017 New Patient with Darling Stubbs MD at 10:10 AM  
Where:  [facility] [address]) Thursday Dec 14, 2017 Any with MD Jasmin at  3:00 PM  
Where:  [facility] [address] [facility]) Discharge Orders None A check flaco indicates which time of day the medication should be taken. My Medications TAKE these medications as instructed Instructions Each Dose to Equal  
 Morning Noon Evening Bedtime LANTUS 100 unit/mL injection Generic drug:  insulin glargine Your last dose was: Your next dose is:    
   
   
 42 Units by SubCUTAneous route daily. 42 Units NovoLOG 100 unit/mL injection Generic drug:  insulin aspart Your last dose was: Your next dose is:    
   
   
 by SubCUTAneous route three (3) times daily (with meals). Patient injects 1 unit for every 8 grams of carbohydrates. Discharge Instructions None [facility] Announce[facility]  We are excited to announce that we are making your provider's discharge notes available to you in [employer]. You will see these notes when they are completed and signed by the physician that discharged you from your recent hospital stay. If you have any questions or concerns about any information you see in [employer], please call the Health Information Department where you were seen or reach out to your Primary Care Provider for more information about your plan of care. Introducing [facility] & HEALTH SERVICES! [facility] introduces [employer] patient portal. Now you can access parts of your medical record, email your doctor's office, and request medication refills online. 1. In your internet browser, go to [email or web address] 2. Click on the First Time User? Click Here link in the Sign In box. You will see the New Member Sign Up page. 3. Enter your [employer] Access Code exactly as it appears below. You will not need to use this code after youve completed the sign-up process. If you do not sign up before the expiration date, you must request a new code. · [employer] Access Code: [email or web address] Expires: 1/22/2018 11:23 AM 
 
4. Enter the last four digits of your Social Security Number (xxxx) and Date of Birth (mm/dd/yyyy) as indicated and click Submit. You will be taken to the next sign-up page. 5. Create a [employer] ID. This will be your [employer] login ID and cannot be changed, so think of one that is secure and easy to remember. 6. Create a [employer] password. You can change your password at any time. 7. Enter your Password Reset Question and Answer. This can be used at a later time if you forget your password. 8. Enter your e-mail address. You will receive e-mail notification when new information is available in [address] 9. Click Sign Up. You can now view and download portions of your medical record. 10. Click the Download Summary menu link to download a portable copy of your medical information. If you have questions, please visit the Frequently Asked Questions section of the MyChart website. Remember, [employer] is NOT to be used for urgent needs. For medical emergencies, dial 911. Now available from your iPhone and Android! Unresulted Labs-Please follow up with your PCP about these lab tests Order Current Status ACTIN (SMOOTH MUSCLE) ANTIBODY In process CERULOPLASMIN In process MITOCHONDRIAL M2 AB In process Providers Seen During Your Hospitalization Provider Specialty Primary office phone Mylene Elmore MD Emergency Medicine [phone] Brenda Argueta MD Internal Medicine [phone] Leonie Sebastian MD Internal Medicine [phone] Your Primary Care Physician (PCP) Primary Care Physician Office Phone Office Fax NONE ** None ** ** None ** You are allergic to the following No active allergies Recent Documentation Height Weight Breastfeeding? BMI Smoking Status 1.676 m 71.3 kg No 25.37 kg/m2 Never Smoker Emergency Contacts Name Discharge Info Relation Home Work Mobile Honey Fairbanks DISCHARGE CAREGIVER [3] Mother [14] [phone] Patient Belongings The following personal items are in your possession at time of discharge: 
  Dental Appliances: None  Visual Aid: None      Home Medications: None   Jewelry: Necklace  Clothing: At bedside    Other Valuables: Cell Phone Please provide this summary of care documentation to your next provider. Signatures-by signing, you are acknowledging that this After Visit Summary has been reviewed with you and you have received a copy. Patient Signature:  ____________________________________________________________ Date:  ____________________________________________________________  
  
[facility] Perfect Provider Signature:  ____________________________________________________________ Date:  ____________________________________________________________

## 2017-11-29 VITALS
HEIGHT: 66 IN | HEART RATE: 99 BPM | SYSTOLIC BLOOD PRESSURE: 137 MMHG | TEMPERATURE: 97.8 F | WEIGHT: 157.19 LBS | BODY MASS INDEX: 25.26 KG/M2 | DIASTOLIC BLOOD PRESSURE: 89 MMHG | OXYGEN SATURATION: 100 % | RESPIRATION RATE: 18 BRPM

## 2017-11-29 LAB
ADMINISTERED INITIALS, ADMINIT: NORMAL
ANION GAP SERPL CALC-SCNC: 12 MMOL/L (ref 5–15)
ANION GAP SERPL CALC-SCNC: 13 MMOL/L (ref 5–15)
ANION GAP SERPL CALC-SCNC: 17 MMOL/L (ref 5–15)
ATRIAL RATE: 112 BPM
BASOPHILS # BLD: 0 K/UL (ref 0–0.1)
BASOPHILS NFR BLD: 1 % (ref 0–1)
BUN SERPL-MCNC: 5 MG/DL (ref 6–20)
BUN SERPL-MCNC: 7 MG/DL (ref 6–20)
BUN SERPL-MCNC: 7 MG/DL (ref 6–20)
BUN/CREAT SERPL: 6 (ref 12–20)
BUN/CREAT SERPL: 7 (ref 12–20)
BUN/CREAT SERPL: 9 (ref 12–20)
CALCIUM SERPL-MCNC: 7.7 MG/DL (ref 8.5–10.1)
CALCIUM SERPL-MCNC: 7.9 MG/DL (ref 8.5–10.1)
CALCIUM SERPL-MCNC: 8 MG/DL (ref 8.5–10.1)
CALCULATED P AXIS, ECG09: 63 DEGREES
CALCULATED R AXIS, ECG10: 45 DEGREES
CALCULATED T AXIS, ECG11: 18 DEGREES
CHLORIDE SERPL-SCNC: 108 MMOL/L (ref 97–108)
CHLORIDE SERPL-SCNC: 108 MMOL/L (ref 97–108)
CHLORIDE SERPL-SCNC: 109 MMOL/L (ref 97–108)
CO2 SERPL-SCNC: 14 MMOL/L (ref 21–32)
CO2 SERPL-SCNC: 15 MMOL/L (ref 21–32)
CO2 SERPL-SCNC: 19 MMOL/L (ref 21–32)
CREAT SERPL-MCNC: 0.8 MG/DL (ref 0.55–1.02)
CREAT SERPL-MCNC: 0.85 MG/DL (ref 0.55–1.02)
CREAT SERPL-MCNC: 0.99 MG/DL (ref 0.55–1.02)
D50 ADMINISTERED, D50ADM: 0 ML
D50 ORDER, D50ORD: 0 ML
DIAGNOSIS, 93000: NORMAL
EOSINOPHIL # BLD: 0.1 K/UL (ref 0–0.4)
EOSINOPHIL NFR BLD: 1 % (ref 0–7)
ERYTHROCYTE [DISTWIDTH] IN BLOOD BY AUTOMATED COUNT: 16 % (ref 11.5–14.5)
FERRITIN SERPL-MCNC: 33 NG/ML (ref 8–252)
GLSCOM COMMENTS: NORMAL
GLUCOSE BLD STRIP.AUTO-MCNC: 115 MG/DL (ref 65–100)
GLUCOSE BLD STRIP.AUTO-MCNC: 129 MG/DL (ref 65–100)
GLUCOSE BLD STRIP.AUTO-MCNC: 130 MG/DL (ref 65–100)
GLUCOSE BLD STRIP.AUTO-MCNC: 141 MG/DL (ref 65–100)
GLUCOSE BLD STRIP.AUTO-MCNC: 148 MG/DL (ref 65–100)
GLUCOSE BLD STRIP.AUTO-MCNC: 154 MG/DL (ref 65–100)
GLUCOSE BLD STRIP.AUTO-MCNC: 164 MG/DL (ref 65–100)
GLUCOSE BLD STRIP.AUTO-MCNC: 171 MG/DL (ref 65–100)
GLUCOSE BLD STRIP.AUTO-MCNC: 185 MG/DL (ref 65–100)
GLUCOSE BLD STRIP.AUTO-MCNC: 186 MG/DL (ref 65–100)
GLUCOSE BLD STRIP.AUTO-MCNC: 192 MG/DL (ref 65–100)
GLUCOSE BLD STRIP.AUTO-MCNC: 199 MG/DL (ref 65–100)
GLUCOSE BLD STRIP.AUTO-MCNC: 234 MG/DL (ref 65–100)
GLUCOSE BLD STRIP.AUTO-MCNC: 261 MG/DL (ref 65–100)
GLUCOSE BLD STRIP.AUTO-MCNC: 269 MG/DL (ref 65–100)
GLUCOSE BLD STRIP.AUTO-MCNC: 532 MG/DL (ref 65–100)
GLUCOSE SERPL-MCNC: 148 MG/DL (ref 65–100)
GLUCOSE SERPL-MCNC: 206 MG/DL (ref 65–100)
GLUCOSE SERPL-MCNC: 254 MG/DL (ref 65–100)
GLUCOSE, GLC: 115 MG/DL
GLUCOSE, GLC: 129 MG/DL
GLUCOSE, GLC: 130 MG/DL
GLUCOSE, GLC: 148 MG/DL
GLUCOSE, GLC: 164 MG/DL
GLUCOSE, GLC: 171 MG/DL
GLUCOSE, GLC: 185 MG/DL
GLUCOSE, GLC: 199 MG/DL
GLUCOSE, GLC: 234 MG/DL
GLUCOSE, GLC: 261 MG/DL
GLUCOSE, GLC: 269 MG/DL
HAV IGM SER QL: NONREACTIVE
HBV CORE IGM SER QL: NONREACTIVE
HBV SURFACE AG SER QL: <0.1 INDEX
HBV SURFACE AG SER QL: NEGATIVE
HCT VFR BLD AUTO: 33 % (ref 35–47)
HCV AB SERPL QL IA: NONREACTIVE
HCV COMMENT,HCGAC: NORMAL
HGB BLD-MCNC: 10.7 G/DL (ref 11.5–16)
HIGH TARGET, HITG: 250 MG/DL
INSULIN ADMINSTERED, INSADM: 0 UNITS/HOUR
INSULIN ADMINSTERED, INSADM: 0.1 UNITS/HOUR
INSULIN ADMINSTERED, INSADM: 0.1 UNITS/HOUR
INSULIN ADMINSTERED, INSADM: 0.6 UNITS/HOUR
INSULIN ADMINSTERED, INSADM: 0.7 UNITS/HOUR
INSULIN ADMINSTERED, INSADM: 1.8 UNITS/HOUR
INSULIN ADMINSTERED, INSADM: 2 UNITS/HOUR
INSULIN ADMINSTERED, INSADM: 2.1 UNITS/HOUR
INSULIN ADMINSTERED, INSADM: 2.2 UNITS/HOUR
INSULIN ADMINSTERED, INSADM: 2.5 UNITS/HOUR
INSULIN ADMINSTERED, INSADM: 6.3 UNITS/HOUR
INSULIN ORDER, INSORD: 0 UNITS/HOUR
INSULIN ORDER, INSORD: 0.1 UNITS/HOUR
INSULIN ORDER, INSORD: 0.1 UNITS/HOUR
INSULIN ORDER, INSORD: 0.6 UNITS/HOUR
INSULIN ORDER, INSORD: 0.7 UNITS/HOUR
INSULIN ORDER, INSORD: 1.8 UNITS/HOUR
INSULIN ORDER, INSORD: 2 UNITS/HOUR
INSULIN ORDER, INSORD: 2.1 UNITS/HOUR
INSULIN ORDER, INSORD: 2.2 UNITS/HOUR
INSULIN ORDER, INSORD: 2.5 UNITS/HOUR
INSULIN ORDER, INSORD: 6.3 UNITS/HOUR
LOW TARGET, LOT: 150 MG/DL
LYMPHOCYTES # BLD: 3.4 K/UL (ref 0.8–3.5)
LYMPHOCYTES NFR BLD: 48 % (ref 12–49)
MAGNESIUM SERPL-MCNC: 1.5 MG/DL (ref 1.6–2.4)
MAGNESIUM SERPL-MCNC: 2 MG/DL (ref 1.6–2.4)
MAGNESIUM SERPL-MCNC: 2.5 MG/DL (ref 1.6–2.4)
MCH RBC QN AUTO: 27.8 PG (ref 26–34)
MCHC RBC AUTO-ENTMCNC: 32.4 G/DL (ref 30–36.5)
MCV RBC AUTO: 85.7 FL (ref 80–99)
MINUTES UNTIL NEXT BG, NBG: 60 MIN
MONOCYTES # BLD: 0.4 K/UL (ref 0–1)
MONOCYTES NFR BLD: 6 % (ref 5–13)
MULTIPLIER, MUL: 0
MULTIPLIER, MUL: 0.01
MULTIPLIER, MUL: 0.02
MULTIPLIER, MUL: 0.03
NEUTS SEG # BLD: 3.1 K/UL (ref 1.8–8)
NEUTS SEG NFR BLD: 44 % (ref 32–75)
ORDER INITIALS, ORDINIT: NORMAL
P-R INTERVAL, ECG05: 124 MS
PHOSPHATE SERPL-MCNC: 1.7 MG/DL (ref 2.6–4.7)
PHOSPHATE SERPL-MCNC: 2.2 MG/DL (ref 2.6–4.7)
PHOSPHATE SERPL-MCNC: 2.7 MG/DL (ref 2.6–4.7)
PLATELET # BLD AUTO: 363 K/UL (ref 150–400)
POTASSIUM SERPL-SCNC: 3.5 MMOL/L (ref 3.5–5.1)
POTASSIUM SERPL-SCNC: 4.5 MMOL/L (ref 3.5–5.1)
POTASSIUM SERPL-SCNC: 4.8 MMOL/L (ref 3.5–5.1)
Q-T INTERVAL, ECG07: 330 MS
QRS DURATION, ECG06: 78 MS
QTC CALCULATION (BEZET), ECG08: 450 MS
RBC # BLD AUTO: 3.85 M/UL (ref 3.8–5.2)
SERVICE CMNT-IMP: ABNORMAL
SODIUM SERPL-SCNC: 137 MMOL/L (ref 136–145)
SODIUM SERPL-SCNC: 139 MMOL/L (ref 136–145)
SODIUM SERPL-SCNC: 139 MMOL/L (ref 136–145)
SP1: NORMAL
SP2: NORMAL
SP3: NORMAL
VENTRICULAR RATE, ECG03: 112 BPM
WBC # BLD AUTO: 7 K/UL (ref 3.6–11)

## 2017-11-29 PROCEDURE — 74011636637 HC RX REV CODE- 636/637: Performed by: EMERGENCY MEDICINE

## 2017-11-29 PROCEDURE — 36415 COLL VENOUS BLD VENIPUNCTURE: CPT | Performed by: HOSPITALIST

## 2017-11-29 PROCEDURE — 74011636637 HC RX REV CODE- 636/637: Performed by: INTERNAL MEDICINE

## 2017-11-29 PROCEDURE — 74011000258 HC RX REV CODE- 258: Performed by: INTERNAL MEDICINE

## 2017-11-29 PROCEDURE — 84100 ASSAY OF PHOSPHORUS: CPT | Performed by: HOSPITALIST

## 2017-11-29 PROCEDURE — 74011250636 HC RX REV CODE- 250/636: Performed by: INTERNAL MEDICINE

## 2017-11-29 PROCEDURE — 74011000258 HC RX REV CODE- 258: Performed by: EMERGENCY MEDICINE

## 2017-11-29 PROCEDURE — 83735 ASSAY OF MAGNESIUM: CPT | Performed by: HOSPITALIST

## 2017-11-29 PROCEDURE — 80048 BASIC METABOLIC PNL TOTAL CA: CPT | Performed by: HOSPITALIST

## 2017-11-29 PROCEDURE — 74011250637 HC RX REV CODE- 250/637: Performed by: INTERNAL MEDICINE

## 2017-11-29 PROCEDURE — 82962 GLUCOSE BLOOD TEST: CPT

## 2017-11-29 PROCEDURE — 85025 COMPLETE CBC W/AUTO DIFF WBC: CPT | Performed by: HOSPITALIST

## 2017-11-29 RX ORDER — MAGNESIUM SULFATE HEPTAHYDRATE 40 MG/ML
2 INJECTION, SOLUTION INTRAVENOUS ONCE
Status: COMPLETED | OUTPATIENT
Start: 2017-11-29 | End: 2017-11-29

## 2017-11-29 RX ORDER — INSULIN GLARGINE 100 [IU]/ML
40 INJECTION, SOLUTION SUBCUTANEOUS DAILY
Status: DISCONTINUED | OUTPATIENT
Start: 2017-11-29 | End: 2017-11-29 | Stop reason: HOSPADM

## 2017-11-29 RX ORDER — INSULIN ASPART 100 [IU]/ML
1 INJECTION, SOLUTION INTRAVENOUS; SUBCUTANEOUS
Qty: 30 VIAL | Refills: 1 | Status: SHIPPED | OUTPATIENT
Start: 2017-11-29

## 2017-11-29 RX ORDER — INSULIN GLARGINE 100 [IU]/ML
42 INJECTION, SOLUTION SUBCUTANEOUS DAILY
Qty: 30 VIAL | Refills: 1 | Status: SHIPPED | OUTPATIENT
Start: 2017-11-29 | End: 2018-05-11

## 2017-11-29 RX ORDER — POTASSIUM CHLORIDE 750 MG/1
40 TABLET, FILM COATED, EXTENDED RELEASE ORAL
Status: COMPLETED | OUTPATIENT
Start: 2017-11-29 | End: 2017-11-29

## 2017-11-29 RX ORDER — POTASSIUM CHLORIDE 750 MG/1
40 TABLET, FILM COATED, EXTENDED RELEASE ORAL ONCE
Status: COMPLETED | OUTPATIENT
Start: 2017-11-29 | End: 2017-11-29

## 2017-11-29 RX ADMIN — DEXTROSE MONOHYDRATE AND SODIUM CHLORIDE 150 ML/HR: 5; .9 INJECTION, SOLUTION INTRAVENOUS at 06:51

## 2017-11-29 RX ADMIN — INSULIN GLARGINE 40 UNITS: 100 INJECTION, SOLUTION SUBCUTANEOUS at 09:28

## 2017-11-29 RX ADMIN — SODIUM CHLORIDE 2.5 UNITS/HR: 900 INJECTION, SOLUTION INTRAVENOUS at 12:06

## 2017-11-29 RX ADMIN — MAGNESIUM SULFATE HEPTAHYDRATE 2 G: 40 INJECTION, SOLUTION INTRAVENOUS at 03:42

## 2017-11-29 RX ADMIN — SODIUM CHLORIDE 2.2 UNITS/HR: 900 INJECTION, SOLUTION INTRAVENOUS at 13:14

## 2017-11-29 RX ADMIN — POTASSIUM CHLORIDE 40 MEQ: 750 TABLET, FILM COATED, EXTENDED RELEASE ORAL at 02:55

## 2017-11-29 RX ADMIN — POTASSIUM CHLORIDE 40 MEQ: 750 TABLET, FILM COATED, EXTENDED RELEASE ORAL at 03:42

## 2017-11-29 RX ADMIN — SODIUM CHLORIDE 2.1 UNITS/HR: 900 INJECTION, SOLUTION INTRAVENOUS at 10:52

## 2017-11-29 RX ADMIN — SODIUM CHLORIDE 0.7 UNITS/HR: 900 INJECTION, SOLUTION INTRAVENOUS at 09:27

## 2017-11-29 NOTE — PROGRESS NOTES
Cm acknowledged consults for pcp, GI. Discussed with patient. No preference. Patient would also like an endocrinologist appt scheduled for follow up regarding her Type I Diabetes.  informed.     Angelika Carolina RN   Ext [phone]

## 2017-11-29 NOTE — DISCHARGE SUMMARY
Physician Discharge Summary     Patient ID:  Ld Betancourt  [id number]  50 y.o.  1995    Admit date: 11/28/2017    Discharge date and time: 11/29/2017    Admission Diagnoses: DKA (diabetic ketoacidoses) [facility])    Discharge Diagnoses: Active Problems:    DKA (diabetic ketoacidoses) ([address] (11/28/2017)           Hospital Course:   20yo F with DM1 admitted with DKA. She developed SOB on Sunday night with palpitations, chest discomfort, occasional nonproductive cough, chills. This caused her to be nauseated and then led to vomiting which caused her blood sugar to rise. PO intake been poor past 2 days. Still been taking her insulin but blood sugar continues to rise. The BS was in 400s and took Lantus 42 units along with 10 units novolog. Report hx of DKA in past whenever she comes down with flu or pneumonia, usually 1-2x/ year. Last hospitalized for DKA about a year ago at [facility].       DKA in DM type 1  unclear trigger.  No significant infection, has yeast in urine but no symptoms of uti  HbA1c 11. 6.  DTC consult to assess home management.    Ashutosh Yoder, IVF transitioned to lantus as AG closed      Elevated liver enzymes, chronic  Hepatomegaly on CT abdomen 10/2017  viral hepatitis, ceruloplasmin, mitochondria antibody, anti-smooth, ferritin, unremarkable  Consulted GI, rec f/u labs      Vaginal yeast infection  --diflucan x 1    Pt remained stable for discharge home to follow up with PCP re: pending AMA, ASMA, cerruloplasmin in 2-4wks    PCP: None       Condition of patient at discharge: stable    Discharge Exam:  Please refer to progress note from today. Disposition: home    Patient Instructions:   Current Discharge Medication List      CONTINUE these medications which have NOT CHANGED    Details   insulin aspart (NOVOLOG) 100 unit/mL injection by SubCUTAneous route three (3) times daily (with meals). Patient injects 1 unit for every 8 grams of carbohydrates.       insulin glargine (LANTUS) 100 unit/mL injection 42 Units by SubCUTAneous route daily.            Activity: Activity as tolerated  Diet: Diabetic Diet    Follow-up with None in 1-2weeks  Follow-up tests/labs  hbA1c    Approximate time spent in patient care on day of discharge: Greater than 30mins     Signed:  Kennedy Espinosa MD  11/29/2017  4:38 PM

## 2017-11-29 NOTE — PROGRESS NOTES
Bedside shift report received from [facility]. SBAR, MAR, DX, VS and plan of care reviewed. 2000  Hourly finger BGT continue. Insulin gtt infusing as ordered. Pt denies N/V/D and requests to be upgraded to a regular diet. Hospitalist paged and new orders received. 2100  BMP drawn. 2215 BMP results called in to hospitalist, Demetria Severe. New orders received. IVF changed. 0200 BMP drawn. 0300 Lab results called in to . New orders received.   4112 BMP drawn. Hourly fingerstick glucose monitoring continue. 0715 Reported off to oncoming RN.

## 2017-11-29 NOTE — DIABETES MGMT
DTC Consult Note    Consult received for:  [x]             Assessment of home management            Chart reviewed and initial evaluation complete on Dianna Fairbanks. Patient is a 25 y.o. female with Type 1 diabetes for ~ 12 years. New to the area, trying to establish care here in the [place]. Reports taking Lantus 40 units daily and Lispro insulin to CHO ratio of 1:8.  Reports being on a [employer] insulin pump, but had to stop 2' insurance coverage. Patient provided a list of endocrinologists by  to set appointment. Assessed and instructed patient on the following:   ·  nutrition, referred to Diabetes Educator, self-injection of insulin and sick day guidelines. Provided patient with the following: [x]             Survival skills education materials                          [x]             Outpatient DTC contact number      Discussed with patient and/or family need for follow up appointment for diabetes management after discharge. A1c:   Lab Results   Component Value Date/Time    Hemoglobin A1c 11.6 11/28/2017 11:22 AM       Recent Glucose Results:   Lab Results   Component Value Date/Time     (H) 11/29/2017 11:18 AM     (H) 11/29/2017 06:53 AM     (H) 11/29/2017 01:55 AM    GLUCPOC 192 (H) 11/29/2017 01:51 PM    GLUCPOC 171 (H) 11/29/2017 12:55 PM    GLUCPOC 185 (H) 11/29/2017 11:40 AM        Lab Results   Component Value Date/Time    Creatinine 0.85 11/29/2017 11:18 AM     Estimated Creatinine Clearance: 105.9 mL/min (based on Cr of 0.85). Active Orders   Diet    DIET DIABETIC CONSISTENT CARB Regular        PO intake: No data found. Current hospital DM medication: Lantus 40 units daily    Will continue to follow as needed. Thank you.     Lakia Smith, [address], [address]  Office:  [phone]

## 2017-11-29 NOTE — PROGRESS NOTES
CM met with patient to discuss discharge planning. Pt name and  confirmed as pt identifiers. Pt admitted 2017 for DKA. Demographics confirmed. Pt lives alone in apartment setting. No steps for entry. ADL's/IADL's - independent  Preferred [facility]    Patient is insured. Does not have copy of insurance card on hand. Patient is insured under her father. Mother is emergency contact. Cm asked if her mother would have insurance information and patient responded \"no, I will call my father and ask him for it\". Patient has prescription coverage. Care Management Interventions  PCP Verified by CM: No (Needs new PCP - just moved to area)  Mode of Transport at Discharge:  Other (see comment) (family)  Discharge Durable Medical Equipment: No  Physical Therapy Consult: No  Occupational Therapy Consult: No  Speech Therapy Consult: No  Current Support Network: Lives Alone  Confirm Follow Up Transport: Family  Discharge Location  Discharge Placement: Goran Chapman RN CM  Ext [phone]

## 2017-11-29 NOTE — ROUTINE PROCESS
follow up appointments scheduled and placed on AVS.   Jesus Gandhi, [facility].  [phone]

## 2017-11-29 NOTE — PROGRESS NOTES
TRANSFER - IN REPORT:    Verbal report received from Angie Fermin, [facility] (name) on Sanam Ferrara  being received from ED (unit) for routine progression of care      Report consisted of patients Situation, Background, Assessment and   Recommendations(SBAR). Information from the following report(s) SBAR, Kardex, MAR, Accordion and Recent Results was reviewed with the receiving nurse. Opportunity for questions and clarification was provided. Assessment completed upon patients arrival to unit and care assumed.

## 2017-11-29 NOTE — PROGRESS NOTES
CM acknowledged discharge order.  is addressing appointments to be scheduled. Cm addressed insurance information status. Patient has not been able to reach her father to acquire as of yet.     Annetta Garcia RN CM  Ext [phone]

## 2017-11-29 NOTE — PROGRESS NOTES
Hospitalist Progress Note      NAME: Rachel Urrutia   :  1995  MRM:  [id number]    Date/Time: 2017  11:09 AM       Assessment / Plan:     18yo F with DM1 admitted with DKA    DKA in DM type 1  unclear trigger. No significant infection, has yeast in urine but no symptoms of uti  HbA1c 11.6.    -DTC consult to assess home management. Marzena William, IVF transitioned to lantus  -Check BMP, mag, phos q6h     Elevated liver enzymes, chronic  Hepatomegaly on CT abdomen 10/2017  viral hepatitis, ceruloplasmin, mitochondria antibody, anti-smooth, ferritin, unremarkable  Consulted GI, rec f/u labs     Vaginal yeast infection  --diflucan x 1     Need to establish pcp  -- consulted  Body mass index is 25.37 kg/(m^2).     Code: full  DVT prophylaxis: lovenox  Surrogate decision maker:  parents         Subjective:     Chief Complaint:  N/v due to dka        ROS:  (bold if positive, if negative)      Tolerating PT  Tolerating Diet          Objective:       Vitals:          Last 24hrs VS reviewed since prior progress note.  Most recent are:    Visit Vitals    /89 (BP 1 Location: Right arm, BP Patient Position: At rest)    Pulse 99    Temp 97.8 °F (36.6 °C)    Resp 18    Ht 5' 6\" (1.676 m)    Wt 71.3 kg (157 lb 3 oz)    SpO2 100%    Breastfeeding No    BMI 25.37 kg/m2     SpO2 Readings from Last 6 Encounters:   17 100%   10/24/17 99%          Intake/Output Summary (Last 24 hours) at 17 1109  Last data filed at 17 0328   Gross per 24 hour   Intake          2759.55 ml   Output                0 ml   Net          2759.55 ml          Exam:     Physical Exam:    Gen:  Well-developed, well-nourished, in no acute distress  HEENT:  Pink conjunctivae, PERRL, hearing intact to voice, moist mucous membranes  Neck:  Supple, without masses, thyroid non-tender  Resp:  No accessory muscle use, clear breath sounds without wheezes, rales or rhonchi  Card:  No murmurs, normal S1, S2 without thrills, bruits or peripheral edema  Abd:  Soft, non-tender, non-distended, normoactive bowel sounds are present  Musc:  No cyanosis, clubbing or edema  Skin:  No rashes or ulcers, skin turgor is good  Neuro:  Cranial nerves 3-12 are grossly intact, follows commands appropriately  Psych:  Good insight, oriented to person, place and time, alert       Telemetry reviewed:   normal sinus rhythm    Medications Reviewed: (see below)    Lab Data Reviewed: (see below)    ______________________________________________________________________    Medications:     Current Facility-Administered Medications   Medication Dose Route Frequency    insulin glargine (LANTUS) injection 40 Units  40 Units SubCUTAneous DAILY    sodium chloride (NS) flush 5-10 mL  5-10 mL IntraVENous Q8H    sodium chloride (NS) flush 5-10 mL  5-10 mL IntraVENous PRN    insulin regular (NOVOLIN R, HUMULIN R) 100 Units in 0.9% sodium chloride 100 mL infusion  0-50 Units/hr IntraVENous TITRATE    glucose chewable tablet 16 g  4 Tab Oral PRN    dextrose (D50W) injection syrg 12.5-25 g  12.5-25 g IntraVENous PRN    glucagon (GLUCAGEN) injection 1 mg  1 mg IntraMUSCular PRN    sodium chloride (NS) flush 5-10 mL  5-10 mL IntraVENous Q8H    sodium chloride (NS) flush 5-10 mL  5-10 mL IntraVENous PRN    acetaminophen (TYLENOL) tablet 650 mg  650 mg Oral Q6H PRN    ondansetron (ZOFRAN) injection 4 mg  4 mg IntraVENous Q6H PRN    enoxaparin (LOVENOX) injection 40 mg  40 mg SubCUTAneous Q24H    dextrose 5% and 0.9% NaCl infusion  150 mL/hr IntraVENous CONTINUOUS            Lab Review:     Recent Labs      11/29/17   0155  11/28/17   1122   WBC  7.0  5.9   HGB  10.7*  12.6   HCT  33.0*  38.4   PLT  363  419*     Recent Labs      11/29/17   0653  11/29/17   0155  11/28/17   2100  11/28/17   1655  11/28/17   1140   NA  137  139  138  138  128*   K  4.8  3.5  3.6  4.4  4.3   CL  109*  108  106  109*  94*   CO2  15*  14*  13*  11*  14*   GLU  254*  148*  121* 117*  601*   BUN  7  7  6  7  10   CREA  0.80  0.99  0.84  0.75  0.72   CA  8.0*  7.7*  7.8*  8.2*  8.7   MG  2.5*  1.5*   --    --   1.8   PHOS  2.7  1.7*   --    --    --    ALB   --    --    --    --   3.0*   TBILI   --    --    --    --   0.6   SGOT   --    --    --    --   75*   ALT   --    --    --    --   101*   INR   --    --    --   1.0   --      Lab Results   Component Value Date/Time    Glucose (POC) 164 11/29/2017 10:44 AM    Glucose (POC) 154 11/29/2017 10:35 AM    Glucose (POC) 130 11/29/2017 09:23 AM    Glucose (POC) 141 11/29/2017 08:11 AM    Glucose (POC) 186 11/29/2017 07:47 AM         Total time spent with patient: 30 [address]95 [address] discussed with: Patient    Code Status: Full               ___________________________________________________    Attending Physician: Kevin Smith MD

## 2017-11-30 LAB
ACTIN IGG SERPL-ACNC: 9 UNITS (ref 0–19)
CERULOPLASMIN SERPL-MCNC: 37.3 MG/DL (ref 19–39)
MITOCHONDRIA M2 IGG SER-ACNC: 5.8 UNITS (ref 0–20)

## 2017-11-30 NOTE — PROGRESS NOTES
Pt provided with discharge instructions Rx and instructions on follow up care and Pt verbalized understanding. Pt assisted off unit via wheel chair.

## 2017-12-08 ENCOUNTER — APPOINTMENT (OUTPATIENT)
Dept: CT IMAGING | Age: 22
DRG: 639 | End: 2017-12-08
Attending: FAMILY MEDICINE
Payer: COMMERCIAL

## 2017-12-08 ENCOUNTER — HOSPITAL ENCOUNTER (INPATIENT)
Age: 22
LOS: 3 days | Discharge: HOME | DRG: 639 | End: 2017-12-11
Attending: EMERGENCY MEDICINE | Admitting: FAMILY MEDICINE
Payer: COMMERCIAL

## 2017-12-08 ENCOUNTER — APPOINTMENT (OUTPATIENT)
Dept: GENERAL RADIOLOGY | Age: 22
DRG: 639 | End: 2017-12-08
Attending: EMERGENCY MEDICINE
Payer: COMMERCIAL

## 2017-12-08 DIAGNOSIS — E10.10 DIABETIC KETOACIDOSIS WITHOUT COMA ASSOCIATED WITH TYPE 1 DIABETES MELLITUS (HCC): Primary | ICD-10-CM

## 2017-12-08 LAB
ADMINISTERED INITIALS, ADMINIT: NORMAL
ALBUMIN SERPL-MCNC: 3.2 G/DL (ref 3.5–5)
ALBUMIN/GLOB SERPL: 0.7 {RATIO} (ref 1.1–2.2)
ALP SERPL-CCNC: 215 U/L (ref 45–117)
ALT SERPL-CCNC: 74 U/L (ref 12–78)
ANION GAP SERPL CALC-SCNC: ABNORMAL MMOL/L (ref 5–15)
APPEARANCE UR: CLEAR
ARTERIAL PATENCY WRIST A: YES
AST SERPL-CCNC: 36 U/L (ref 15–37)
BACTERIA URNS QL MICRO: NEGATIVE /HPF
BASOPHILS # BLD: 0 K/UL (ref 0–0.1)
BASOPHILS NFR BLD: 0 % (ref 0–1)
BDY SITE: ABNORMAL
BILIRUB SERPL-MCNC: 0.4 MG/DL (ref 0.2–1)
BILIRUB UR QL: NEGATIVE
BUN SERPL-MCNC: 10 MG/DL (ref 6–20)
BUN/CREAT SERPL: 11 (ref 12–20)
CALCIUM SERPL-MCNC: 8.4 MG/DL (ref 8.5–10.1)
CHLORIDE SERPL-SCNC: 103 MMOL/L (ref 97–108)
CO2 SERPL-SCNC: <5 MMOL/L (ref 21–32)
COLOR UR: ABNORMAL
CREAT SERPL-MCNC: 0.87 MG/DL (ref 0.55–1.02)
D50 ADMINISTERED, D50ADM: 0 ML
D50 ORDER, D50ORD: 0 ML
DIFFERENTIAL METHOD BLD: ABNORMAL
EOSINOPHIL # BLD: 0 K/UL (ref 0–0.4)
EOSINOPHIL NFR BLD: 0 % (ref 0–7)
EPITH CASTS URNS QL MICRO: ABNORMAL /LPF
ERYTHROCYTE [DISTWIDTH] IN BLOOD BY AUTOMATED COUNT: 15.9 % (ref 11.5–14.5)
EST. AVERAGE GLUCOSE BLD GHB EST-MCNC: 292 MG/DL
GAS FLOW.O2 O2 DELIVERY SYS: ABNORMAL L/MIN
GLOBULIN SER CALC-MCNC: 4.8 G/DL (ref 2–4)
GLSCOM COMMENTS: NORMAL
GLUCOSE BLD STRIP.AUTO-MCNC: 207 MG/DL (ref 65–100)
GLUCOSE BLD STRIP.AUTO-MCNC: 280 MG/DL (ref 65–100)
GLUCOSE BLD STRIP.AUTO-MCNC: 461 MG/DL (ref 65–100)
GLUCOSE BLD STRIP.AUTO-MCNC: 591 MG/DL (ref 65–100)
GLUCOSE SERPL-MCNC: 629 MG/DL (ref 65–100)
GLUCOSE UR STRIP.AUTO-MCNC: >1000 MG/DL
GLUCOSE, GLC: 207 MG/DL
GLUCOSE, GLC: 280 MG/DL
GLUCOSE, GLC: 461 MG/DL
GLUCOSE, GLC: 601 MG/DL
HBA1C MFR BLD: 11.8 % (ref 4.2–6.3)
HCG UR QL: NEGATIVE
HCT VFR BLD AUTO: 42.1 % (ref 35–47)
HGB BLD-MCNC: 12.8 G/DL (ref 11.5–16)
HGB UR QL STRIP: NEGATIVE
HIGH TARGET, HITG: 250 MG/DL
HYALINE CASTS URNS QL MICRO: ABNORMAL /LPF (ref 0–5)
INSULIN ADMINSTERED, INSADM: 10.8 UNITS/HOUR
INSULIN ADMINSTERED, INSADM: 2.9 UNITS/HOUR
INSULIN ADMINSTERED, INSADM: 4.4 UNITS/HOUR
INSULIN ADMINSTERED, INSADM: 8 UNITS/HOUR
INSULIN ORDER, INSORD: 10.8 UNITS/HOUR
INSULIN ORDER, INSORD: 2.9 UNITS/HOUR
INSULIN ORDER, INSORD: 4.4 UNITS/HOUR
INSULIN ORDER, INSORD: 8 UNITS/HOUR
KETONES UR QL STRIP.AUTO: >80 MG/DL
LEUKOCYTE ESTERASE UR QL STRIP.AUTO: NEGATIVE
LOW TARGET, LOT: 150 MG/DL
LYMPHOCYTES # BLD: 1.8 K/UL (ref 0.8–3.5)
LYMPHOCYTES NFR BLD: 17 % (ref 12–49)
MCH RBC QN AUTO: 27.5 PG (ref 26–34)
MCHC RBC AUTO-ENTMCNC: 30.4 G/DL (ref 30–36.5)
MCV RBC AUTO: 90.5 FL (ref 80–99)
MINUTES UNTIL NEXT BG, NBG: 60 MIN
MONOCYTES # BLD: 0.3 K/UL (ref 0–1)
MONOCYTES NFR BLD: 3 % (ref 5–13)
MULTIPLIER, MUL: 0.02
NEUTS SEG # BLD: 8.3 K/UL (ref 1.8–8)
NEUTS SEG NFR BLD: 80 % (ref 32–75)
NITRITE UR QL STRIP.AUTO: NEGATIVE
ORDER INITIALS, ORDINIT: NORMAL
PCO2 BLD: <17 MMHG (ref 35–45)
PH BLD: 6.83 [PH] (ref 7.35–7.45)
PH UR STRIP: 5 [PH] (ref 5–8)
PLATELET # BLD AUTO: 450 K/UL (ref 150–400)
PO2 BLD: 142 MMHG (ref 80–100)
POTASSIUM SERPL-SCNC: 5.2 MMOL/L (ref 3.5–5.1)
PROT SERPL-MCNC: 8 G/DL (ref 6.4–8.2)
PROT UR STRIP-MCNC: ABNORMAL MG/DL
RBC # BLD AUTO: 4.65 M/UL (ref 3.8–5.2)
RBC #/AREA URNS HPF: ABNORMAL /HPF (ref 0–5)
RBC MORPH BLD: ABNORMAL
SERVICE CMNT-IMP: ABNORMAL
SODIUM SERPL-SCNC: 133 MMOL/L (ref 136–145)
SP GR UR REFRACTOMETRY: 1.02 (ref 1–1.03)
SPECIMEN TYPE: ABNORMAL
TOTAL RESP. RATE, ITRR: 25
UR CULT HOLD, URHOLD: NORMAL
UROBILINOGEN UR QL STRIP.AUTO: 0.2 EU/DL (ref 0.2–1)
WBC # BLD AUTO: 10.4 K/UL (ref 3.6–11)
WBC URNS QL MICRO: ABNORMAL /HPF (ref 0–4)

## 2017-12-08 PROCEDURE — 74011250636 HC RX REV CODE- 250/636: Performed by: FAMILY MEDICINE

## 2017-12-08 PROCEDURE — 36415 COLL VENOUS BLD VENIPUNCTURE: CPT | Performed by: EMERGENCY MEDICINE

## 2017-12-08 PROCEDURE — 93005 ELECTROCARDIOGRAM TRACING: CPT

## 2017-12-08 PROCEDURE — 77030032490 HC SLV COMPR SCD KNE COVD -B

## 2017-12-08 PROCEDURE — 80053 COMPREHEN METABOLIC PANEL: CPT | Performed by: EMERGENCY MEDICINE

## 2017-12-08 PROCEDURE — 65610000006 HC RM INTENSIVE CARE

## 2017-12-08 PROCEDURE — 83036 HEMOGLOBIN GLYCOSYLATED A1C: CPT | Performed by: EMERGENCY MEDICINE

## 2017-12-08 PROCEDURE — 74011250636 HC RX REV CODE- 250/636

## 2017-12-08 PROCEDURE — 71010 XR CHEST PORT: CPT

## 2017-12-08 PROCEDURE — 74011636637 HC RX REV CODE- 636/637: Performed by: EMERGENCY MEDICINE

## 2017-12-08 PROCEDURE — 96375 TX/PRO/DX INJ NEW DRUG ADDON: CPT

## 2017-12-08 PROCEDURE — 96374 THER/PROPH/DIAG INJ IV PUSH: CPT

## 2017-12-08 PROCEDURE — 82803 BLOOD GASES ANY COMBINATION: CPT

## 2017-12-08 PROCEDURE — 82962 GLUCOSE BLOOD TEST: CPT

## 2017-12-08 PROCEDURE — 81025 URINE PREGNANCY TEST: CPT

## 2017-12-08 PROCEDURE — 74011000250 HC RX REV CODE- 250

## 2017-12-08 PROCEDURE — 74176 CT ABD & PELVIS W/O CONTRAST: CPT

## 2017-12-08 PROCEDURE — 74011250636 HC RX REV CODE- 250/636: Performed by: EMERGENCY MEDICINE

## 2017-12-08 PROCEDURE — 74011000258 HC RX REV CODE- 258: Performed by: FAMILY MEDICINE

## 2017-12-08 PROCEDURE — 74011000250 HC RX REV CODE- 250: Performed by: FAMILY MEDICINE

## 2017-12-08 PROCEDURE — 36600 WITHDRAWAL OF ARTERIAL BLOOD: CPT

## 2017-12-08 PROCEDURE — 81001 URINALYSIS AUTO W/SCOPE: CPT | Performed by: EMERGENCY MEDICINE

## 2017-12-08 PROCEDURE — 74011000258 HC RX REV CODE- 258: Performed by: EMERGENCY MEDICINE

## 2017-12-08 PROCEDURE — 99284 EMERGENCY DEPT VISIT MOD MDM: CPT

## 2017-12-08 PROCEDURE — 85025 COMPLETE CBC W/AUTO DIFF WBC: CPT | Performed by: EMERGENCY MEDICINE

## 2017-12-08 RX ORDER — INSULIN LISPRO 100 [IU]/ML
INJECTION, SOLUTION INTRAVENOUS; SUBCUTANEOUS
Status: DISCONTINUED | OUTPATIENT
Start: 2017-12-09 | End: 2017-12-08 | Stop reason: SDUPTHER

## 2017-12-08 RX ORDER — ONDANSETRON 2 MG/ML
4 INJECTION INTRAMUSCULAR; INTRAVENOUS
Status: COMPLETED | OUTPATIENT
Start: 2017-12-08 | End: 2017-12-08

## 2017-12-08 RX ORDER — ONDANSETRON 2 MG/ML
4 INJECTION INTRAMUSCULAR; INTRAVENOUS
Status: DISCONTINUED | OUTPATIENT
Start: 2017-12-08 | End: 2017-12-11 | Stop reason: HOSPADM

## 2017-12-08 RX ORDER — SODIUM CHLORIDE 9 MG/ML
250 INJECTION, SOLUTION INTRAVENOUS CONTINUOUS
Status: DISCONTINUED | OUTPATIENT
Start: 2017-12-08 | End: 2017-12-09

## 2017-12-08 RX ORDER — INSULIN LISPRO 100 [IU]/ML
INJECTION, SOLUTION INTRAVENOUS; SUBCUTANEOUS
Status: DISCONTINUED | OUTPATIENT
Start: 2017-12-09 | End: 2017-12-11 | Stop reason: SDUPTHER

## 2017-12-08 RX ORDER — SODIUM BICARBONATE 1 MEQ/ML
SYRINGE (ML) INTRAVENOUS
Status: COMPLETED
Start: 2017-12-08 | End: 2017-12-08

## 2017-12-08 RX ORDER — MAGNESIUM SULFATE 100 %
4 CRYSTALS MISCELLANEOUS AS NEEDED
Status: DISCONTINUED | OUTPATIENT
Start: 2017-12-08 | End: 2017-12-08 | Stop reason: SDUPTHER

## 2017-12-08 RX ORDER — SODIUM BICARBONATE 1 MEQ/ML
50 SYRINGE (ML) INTRAVENOUS ONCE
Status: COMPLETED | OUTPATIENT
Start: 2017-12-08 | End: 2017-12-08

## 2017-12-08 RX ORDER — SODIUM CHLORIDE 0.9 % (FLUSH) 0.9 %
5-10 SYRINGE (ML) INJECTION EVERY 8 HOURS
Status: DISCONTINUED | OUTPATIENT
Start: 2017-12-08 | End: 2017-12-11 | Stop reason: HOSPADM

## 2017-12-08 RX ORDER — SODIUM CHLORIDE 0.9 % (FLUSH) 0.9 %
5-10 SYRINGE (ML) INJECTION AS NEEDED
Status: DISCONTINUED | OUTPATIENT
Start: 2017-12-08 | End: 2017-12-11 | Stop reason: HOSPADM

## 2017-12-08 RX ORDER — MAGNESIUM SULFATE 100 %
4 CRYSTALS MISCELLANEOUS AS NEEDED
Status: DISCONTINUED | OUTPATIENT
Start: 2017-12-08 | End: 2017-12-11 | Stop reason: HOSPADM

## 2017-12-08 RX ORDER — DEXTROSE 50 % IN WATER (D50W) INTRAVENOUS SYRINGE
12.5-25 AS NEEDED
Status: DISCONTINUED | OUTPATIENT
Start: 2017-12-08 | End: 2017-12-11 | Stop reason: HOSPADM

## 2017-12-08 RX ORDER — ONDANSETRON 2 MG/ML
INJECTION INTRAMUSCULAR; INTRAVENOUS
Status: COMPLETED
Start: 2017-12-08 | End: 2017-12-08

## 2017-12-08 RX ORDER — DEXTROSE 50 % IN WATER (D50W) INTRAVENOUS SYRINGE
12.5-25 AS NEEDED
Status: DISCONTINUED | OUTPATIENT
Start: 2017-12-08 | End: 2017-12-08 | Stop reason: SDUPTHER

## 2017-12-08 RX ADMIN — SODIUM CHLORIDE 1000 ML: 900 INJECTION, SOLUTION INTRAVENOUS at 18:21

## 2017-12-08 RX ADMIN — HUMAN INSULIN 10 UNITS: 100 INJECTION, SOLUTION SUBCUTANEOUS at 19:38

## 2017-12-08 RX ADMIN — SODIUM CHLORIDE 10.8 UNITS/HR: 900 INJECTION, SOLUTION INTRAVENOUS at 19:40

## 2017-12-08 RX ADMIN — Medication 50 MEQ: at 20:57

## 2017-12-08 RX ADMIN — Medication 10 ML: at 22:00

## 2017-12-08 RX ADMIN — SODIUM CHLORIDE 1000 ML: 900 INJECTION, SOLUTION INTRAVENOUS at 22:03

## 2017-12-08 RX ADMIN — SODIUM CHLORIDE: 450 INJECTION, SOLUTION INTRAVENOUS at 21:42

## 2017-12-08 RX ADMIN — ONDANSETRON 4 MG: 2 INJECTION INTRAMUSCULAR; INTRAVENOUS at 22:02

## 2017-12-08 RX ADMIN — SODIUM BICARBONATE 50 MEQ: 84 INJECTION INTRAVENOUS at 20:57

## 2017-12-08 RX ADMIN — SODIUM CHLORIDE 1000 ML: 900 INJECTION, SOLUTION INTRAVENOUS at 19:18

## 2017-12-08 RX ADMIN — ONDANSETRON 4 MG: 2 INJECTION INTRAMUSCULAR; INTRAVENOUS at 19:17

## 2017-12-08 RX ADMIN — SODIUM CHLORIDE 8 UNITS/HR: 900 INJECTION, SOLUTION INTRAVENOUS at 20:41

## 2017-12-08 RX ADMIN — SODIUM CHLORIDE 1000 ML: 900 INJECTION, SOLUTION INTRAVENOUS at 21:03

## 2017-12-08 NOTE — IP AVS SNAPSHOT
[address] 
[phone] Patient: Annie Burgess MRN: [id number] :1995 My Medications TAKE these medications as instructed Instructions Each Dose to Equal  
 Morning Noon Evening Bedtime  
 insulin aspart 100 unit/mL injection Commonly known as:  Alpha Thanh Your last dose was: Your next dose is:    
   
   
 1 Units by SubCUTAneous route three (3) times daily (with meals). Patient injects 1 unit for every 8 grams of carbohydrates. 1 Units  
    
   
   
   
  
 insulin glargine 100 unit/mL injection Commonly known as:  LANTUS Your last dose was: Your next dose is:    
   
   
 42 Units by SubCUTAneous route daily. 42 Units

## 2017-12-08 NOTE — IP AVS SNAPSHOT
[address] 
[phone] Patient: Ld Betancourt MRN: [id number] :1995 About your hospitalization You were admitted on:  2017 You last received care in the:  [address] [place] [address] You were discharged on:  2017 Why you were hospitalized Your primary diagnosis was:  Dka (Diabetic Ketoacidoses) (Hcc) Things You Need To Do (next 8 weeks) Follow up with None Where:  None (395) Patient stated that they have no PCP Follow up with   
please find a pcp Thursday Dec 14, 2017 Any with Alfie Ferrell MD at  3:00 PM  
Where:  [facility] [address] [facility]) Discharge Orders None A check flaco indicates which time of day the medication should be taken. My Medications TAKE these medications as instructed Instructions Each Dose to Equal  
 Morning Noon Evening Bedtime  
 insulin aspart 100 unit/mL injection Commonly known as:  Falguni Eth Your last dose was: Your next dose is:    
   
   
 1 Units by SubCUTAneous route three (3) times daily (with meals). Patient injects 1 unit for every 8 grams of carbohydrates. 1 Units  
    
   
   
   
  
 insulin glargine 100 unit/mL injection Commonly known as:  LANTUS Your last dose was: Your next dose is:    
   
   
 42 Units by SubCUTAneous route daily. 42 Units Discharge Instructions Discharge Instructions PATIENT ID: Ld Betancourt MRN: [id number] YOB: 1995 DATE OF ADMISSION: 2017  6:08 PM   
DATE OF DISCHARGE: 2017 PRIMARY CARE PROVIDER: None ATTENDING PHYSICIAN: Chasidy Fuentes MD 
DISCHARGING PROVIDER: Chasidy Fuentes MD   
To contact this individual call [phone] and ask the  to page. If unavailable ask to be transferred the Adult Hospitalist Department. DISCHARGE DIAGNOSES Diabetic ketoacidosis CONSULTATIONS: IP CONSULT TO HOSPITALIST 
IP CONSULT TO NEPHROLOGY PROCEDURES/SURGERIES: * No surgery found * PENDING TEST RESULTS:  
At the time of discharge the following test results are still pending: FOLLOW UP APPOINTMENTS:  
Follow-up Information Follow up With Details Comments Contact Info None   None (395) Patient stated that they have no PCP 
  
  
  
 
ADDITIONAL CARE RECOMMENDATIONS:  
Please take lantus and follow up with pcp And advise if you can find endocrinologist according to your insurance DIET: Regular Diet Oral Nutritional Supplements:  
 
ACTIVITY: Activity as tolerated WOUND CARE:  
 
EQUIPMENT needed:  
 
 
  
 SNF/Inpatient Rehab/LTAC Independent/assisted living Hospice Other: CDMP Checked:  
Yes x PROBLEM LIST Updated: 
Yes x Signed:  
Mica Mena MD 
12/11/2017 11:37 AM 
 
  
  
  
Introducing [facility]! Payton Reyes introduces ShelfFlip patient portal. Now you can access parts of your medical record, email your doctor's office, and request medication refills online. 1. In your internet browser, go to [email or web address] 2. Click on the First Time User? Click Here link in the Sign In box. You will see the New Member Sign Up page. 3. Enter your Rootdown Access Code exactly as it appears below. You will not need to use this code after youve completed the sign-up process. If you do not sign up before the expiration date, you must request a new code. · Rootdown Access Code: [email or web address] Expires: 1/22/2018 11:23 AM 
 
4. Enter the last four digits of your Social Security Number (xxxx) and Date of Birth (mm/dd/yyyy) as indicated and click Submit. You will be taken to the next sign-up page. 5. Create a Xceliantt ID. This will be your Rootdown login ID and cannot be changed, so think of one that is secure and easy to remember. 6. Create a Rootdown password. You can change your password at any time. 7. Enter your Password Reset Question and Answer. This can be used at a later time if you forget your password. 8. Enter your e-mail address. You will receive e-mail notification when new information is available in [address] 9. Click Sign Up. You can now view and download portions of your medical record. 10. Click the Download Summary menu link to download a portable copy of your medical information. If you have questions, please visit the Frequently Asked Questions section of the Rootdown website. Remember, Rootdown is NOT to be used for urgent needs. For medical emergencies, dial 911. Now available from your iPhone and Android! Providers Seen During Your Hospitalization Provider Specialty Primary office phone Dwain Carvajal MD Emergency Medicine [phone] Brianne Hennessy MD Hospitalist [phone] Mica Mena MD Internal Medicine [phone] Your Primary Care Physician (PCP) Primary Care Physician Office Phone Office Fax NONE ** None ** ** None ** You are allergic to the following No active allergies Recent Documentation Height Weight BMI Smoking Status 1.676 m 69.2 kg 24.64 kg/m2 Never Smoker Emergency Contacts Name Discharge Info Relation Home Work Mobile Honey Fairbanks DISCHARGE CAREGIVER [3] Mother [14] [phone] Patient Belongings The following personal items are in your possession at time of discharge: 
  Dental Appliances: None         Home Medications: None   Jewelry: None  Clothing: None    Other Valuables: At bedside, Cell Phone Please provide this summary of care documentation to your next provider. Signatures-by signing, you are acknowledging that this After Visit Summary has been reviewed with you and you have received a copy. Patient Signature:  ____________________________________________________________ Date:  ____________________________________________________________  
  
Diane Agapito Provider Signature:  ____________________________________________________________ Date:  ____________________________________________________________

## 2017-12-08 NOTE — ED TRIAGE NOTES
TRIAGE NOTE:  Patient arrives from home with hyperglycemia. Patient is type 1 daibetic. She has been feeling bad this this morning when her blood sugars where reading \"HIGH\".

## 2017-12-09 ENCOUNTER — APPOINTMENT (OUTPATIENT)
Dept: GENERAL RADIOLOGY | Age: 22
DRG: 639 | End: 2017-12-09
Attending: INTERNAL MEDICINE
Payer: COMMERCIAL

## 2017-12-09 LAB
ADMINISTERED INITIALS, ADMINIT: NORMAL
ANION GAP SERPL CALC-SCNC: 14 MMOL/L (ref 5–15)
ANION GAP SERPL CALC-SCNC: 16 MMOL/L (ref 5–15)
ANION GAP SERPL CALC-SCNC: 17 MMOL/L (ref 5–15)
ANION GAP SERPL CALC-SCNC: 17 MMOL/L (ref 5–15)
ANION GAP SERPL CALC-SCNC: 18 MMOL/L (ref 5–15)
ARTERIAL PATENCY WRIST A: ABNORMAL
ATRIAL RATE: 123 BPM
BASE DEFICIT BLD-SCNC: 22 MMOL/L
BASOPHILS # BLD: 0 K/UL (ref 0–0.1)
BASOPHILS NFR BLD: 0 % (ref 0–1)
BDY SITE: ABNORMAL
BUN SERPL-MCNC: 2 MG/DL (ref 6–20)
BUN SERPL-MCNC: 4 MG/DL (ref 6–20)
BUN SERPL-MCNC: 5 MG/DL (ref 6–20)
BUN SERPL-MCNC: 6 MG/DL (ref 6–20)
BUN SERPL-MCNC: 7 MG/DL (ref 6–20)
BUN/CREAT SERPL: 11 (ref 12–20)
BUN/CREAT SERPL: 3 (ref 12–20)
BUN/CREAT SERPL: 5 (ref 12–20)
BUN/CREAT SERPL: 6 (ref 12–20)
BUN/CREAT SERPL: 6 (ref 12–20)
CALCIUM SERPL-MCNC: 7.7 MG/DL (ref 8.5–10.1)
CALCIUM SERPL-MCNC: 7.7 MG/DL (ref 8.5–10.1)
CALCIUM SERPL-MCNC: 7.9 MG/DL (ref 8.5–10.1)
CALCIUM SERPL-MCNC: 7.9 MG/DL (ref 8.5–10.1)
CALCIUM SERPL-MCNC: 8 MG/DL (ref 8.5–10.1)
CALCULATED P AXIS, ECG09: 67 DEGREES
CALCULATED R AXIS, ECG10: 61 DEGREES
CALCULATED T AXIS, ECG11: 27 DEGREES
CHLORIDE SERPL-SCNC: 111 MMOL/L (ref 97–108)
CHLORIDE SERPL-SCNC: 113 MMOL/L (ref 97–108)
CHLORIDE SERPL-SCNC: 117 MMOL/L (ref 97–108)
CO2 SERPL-SCNC: 12 MMOL/L (ref 21–32)
CO2 SERPL-SCNC: 13 MMOL/L (ref 21–32)
CO2 SERPL-SCNC: 13 MMOL/L (ref 21–32)
CO2 SERPL-SCNC: 19 MMOL/L (ref 21–32)
CO2 SERPL-SCNC: 9 MMOL/L (ref 21–32)
CREAT SERPL-MCNC: 0.66 MG/DL (ref 0.55–1.02)
CREAT SERPL-MCNC: 0.76 MG/DL (ref 0.55–1.02)
CREAT SERPL-MCNC: 0.77 MG/DL (ref 0.55–1.02)
CREAT SERPL-MCNC: 0.86 MG/DL (ref 0.55–1.02)
CREAT SERPL-MCNC: 1.02 MG/DL (ref 0.55–1.02)
D50 ADMINISTERED, D50ADM: 0 ML
D50 ORDER, D50ORD: 0 ML
DIAGNOSIS, 93000: NORMAL
EOSINOPHIL # BLD: 0 K/UL (ref 0–0.4)
EOSINOPHIL NFR BLD: 0 % (ref 0–7)
ERYTHROCYTE [DISTWIDTH] IN BLOOD BY AUTOMATED COUNT: 15.6 % (ref 11.5–14.5)
EST. AVERAGE GLUCOSE BLD GHB EST-MCNC: 283 MG/DL
GAS FLOW.O2 O2 DELIVERY SYS: ABNORMAL L/MIN
GLSCOM COMMENTS: NORMAL
GLUCOSE BLD STRIP.AUTO-MCNC: 120 MG/DL (ref 65–100)
GLUCOSE BLD STRIP.AUTO-MCNC: 131 MG/DL (ref 65–100)
GLUCOSE BLD STRIP.AUTO-MCNC: 133 MG/DL (ref 65–100)
GLUCOSE BLD STRIP.AUTO-MCNC: 141 MG/DL (ref 65–100)
GLUCOSE BLD STRIP.AUTO-MCNC: 148 MG/DL (ref 65–100)
GLUCOSE BLD STRIP.AUTO-MCNC: 149 MG/DL (ref 65–100)
GLUCOSE BLD STRIP.AUTO-MCNC: 152 MG/DL (ref 65–100)
GLUCOSE BLD STRIP.AUTO-MCNC: 156 MG/DL (ref 65–100)
GLUCOSE BLD STRIP.AUTO-MCNC: 158 MG/DL (ref 65–100)
GLUCOSE BLD STRIP.AUTO-MCNC: 159 MG/DL (ref 65–100)
GLUCOSE BLD STRIP.AUTO-MCNC: 160 MG/DL (ref 65–100)
GLUCOSE BLD STRIP.AUTO-MCNC: 160 MG/DL (ref 65–100)
GLUCOSE BLD STRIP.AUTO-MCNC: 167 MG/DL (ref 65–100)
GLUCOSE BLD STRIP.AUTO-MCNC: 195 MG/DL (ref 65–100)
GLUCOSE BLD STRIP.AUTO-MCNC: 224 MG/DL (ref 65–100)
GLUCOSE BLD STRIP.AUTO-MCNC: 225 MG/DL (ref 65–100)
GLUCOSE BLD STRIP.AUTO-MCNC: 228 MG/DL (ref 65–100)
GLUCOSE BLD STRIP.AUTO-MCNC: 234 MG/DL (ref 65–100)
GLUCOSE BLD STRIP.AUTO-MCNC: 253 MG/DL (ref 65–100)
GLUCOSE BLD STRIP.AUTO-MCNC: 264 MG/DL (ref 65–100)
GLUCOSE SERPL-MCNC: 122 MG/DL (ref 65–100)
GLUCOSE SERPL-MCNC: 154 MG/DL (ref 65–100)
GLUCOSE SERPL-MCNC: 163 MG/DL (ref 65–100)
GLUCOSE SERPL-MCNC: 178 MG/DL (ref 65–100)
GLUCOSE SERPL-MCNC: 182 MG/DL (ref 65–100)
GLUCOSE, GLC: 120 MG/DL
GLUCOSE, GLC: 131 MG/DL
GLUCOSE, GLC: 133 MG/DL
GLUCOSE, GLC: 141 MG/DL
GLUCOSE, GLC: 148 MG/DL
GLUCOSE, GLC: 149 MG/DL
GLUCOSE, GLC: 152 MG/DL
GLUCOSE, GLC: 156 MG/DL
GLUCOSE, GLC: 158 MG/DL
GLUCOSE, GLC: 159 MG/DL
GLUCOSE, GLC: 160 MG/DL
GLUCOSE, GLC: 160 MG/DL
GLUCOSE, GLC: 167 MG/DL
GLUCOSE, GLC: 195 MG/DL
GLUCOSE, GLC: 224 MG/DL
GLUCOSE, GLC: 225 MG/DL
GLUCOSE, GLC: 228 MG/DL
GLUCOSE, GLC: 234 MG/DL
GLUCOSE, GLC: 253 MG/DL
GLUCOSE, GLC: 264 MG/DL
HBA1C MFR BLD: 11.5 % (ref 4.2–6.3)
HCO3 BLD-SCNC: 6.4 MMOL/L (ref 22–26)
HCT VFR BLD AUTO: 37.2 % (ref 35–47)
HGB BLD-MCNC: 11.9 G/DL (ref 11.5–16)
HIGH TARGET, HITG: 180 MG/DL
HIGH TARGET, HITG: 250 MG/DL
INSULIN ADMINSTERED, INSADM: 0 UNITS/HOUR
INSULIN ADMINSTERED, INSADM: 0.7 UNITS/HOUR
INSULIN ADMINSTERED, INSADM: 0.8 UNITS/HOUR
INSULIN ADMINSTERED, INSADM: 0.9 UNITS/HOUR
INSULIN ADMINSTERED, INSADM: 1 UNITS/HOUR
INSULIN ADMINSTERED, INSADM: 1.1 UNITS/HOUR
INSULIN ADMINSTERED, INSADM: 1.4 UNITS/HOUR
INSULIN ADMINSTERED, INSADM: 1.6 UNITS/HOUR
INSULIN ADMINSTERED, INSADM: 1.7 UNITS/HOUR
INSULIN ADMINSTERED, INSADM: 1.8 UNITS/HOUR
INSULIN ADMINSTERED, INSADM: 2 UNITS/HOUR
INSULIN ADMINSTERED, INSADM: 2 UNITS/HOUR
INSULIN ADMINSTERED, INSADM: 3.9 UNITS/HOUR
INSULIN ORDER, INSORD: 0 UNITS/HOUR
INSULIN ORDER, INSORD: 0.7 UNITS/HOUR
INSULIN ORDER, INSORD: 0.8 UNITS/HOUR
INSULIN ORDER, INSORD: 0.9 UNITS/HOUR
INSULIN ORDER, INSORD: 1 UNITS/HOUR
INSULIN ORDER, INSORD: 1.1 UNITS/HOUR
INSULIN ORDER, INSORD: 1.4 UNITS/HOUR
INSULIN ORDER, INSORD: 1.6 UNITS/HOUR
INSULIN ORDER, INSORD: 1.7 UNITS/HOUR
INSULIN ORDER, INSORD: 1.8 UNITS/HOUR
INSULIN ORDER, INSORD: 2 UNITS/HOUR
INSULIN ORDER, INSORD: 2 UNITS/HOUR
INSULIN ORDER, INSORD: 3.9 UNITS/HOUR
LACTATE SERPL-SCNC: 1.7 MMOL/L (ref 0.4–2)
LOW TARGET, LOT: 140 MG/DL
LOW TARGET, LOT: 150 MG/DL
LYMPHOCYTES # BLD: 1.2 K/UL (ref 0.8–3.5)
LYMPHOCYTES NFR BLD: 9 % (ref 12–49)
MAGNESIUM SERPL-MCNC: 1.6 MG/DL (ref 1.6–2.4)
MAGNESIUM SERPL-MCNC: 1.7 MG/DL (ref 1.6–2.4)
MAGNESIUM SERPL-MCNC: 1.8 MG/DL (ref 1.6–2.4)
MAGNESIUM SERPL-MCNC: 1.9 MG/DL (ref 1.6–2.4)
MAGNESIUM SERPL-MCNC: 1.9 MG/DL (ref 1.6–2.4)
MCH RBC QN AUTO: 27.6 PG (ref 26–34)
MCHC RBC AUTO-ENTMCNC: 32 G/DL (ref 30–36.5)
MCV RBC AUTO: 86.3 FL (ref 80–99)
MINUTES UNTIL NEXT BG, NBG: 120 MIN
MINUTES UNTIL NEXT BG, NBG: 60 MIN
MONOCYTES # BLD: 0.5 K/UL (ref 0–1)
MONOCYTES NFR BLD: 4 % (ref 5–13)
MULTIPLIER, MUL: 0
MULTIPLIER, MUL: 0.01
MULTIPLIER, MUL: 0.02
NEUTS SEG # BLD: 11.6 K/UL (ref 1.8–8)
NEUTS SEG NFR BLD: 87 % (ref 32–75)
ORDER INITIALS, ORDINIT: NORMAL
P-R INTERVAL, ECG05: 118 MS
PCO2 BLD: 17.9 MMHG (ref 35–45)
PH BLD: 7.16 [PH] (ref 7.35–7.45)
PHOSPHATE SERPL-MCNC: 1.5 MG/DL (ref 2.6–4.7)
PLATELET # BLD AUTO: 379 K/UL (ref 150–400)
PO2 BLD: 115 MMHG (ref 80–100)
POTASSIUM SERPL-SCNC: 3.1 MMOL/L (ref 3.5–5.1)
POTASSIUM SERPL-SCNC: 3.2 MMOL/L (ref 3.5–5.1)
POTASSIUM SERPL-SCNC: 3.6 MMOL/L (ref 3.5–5.1)
POTASSIUM SERPL-SCNC: 3.9 MMOL/L (ref 3.5–5.1)
POTASSIUM SERPL-SCNC: 3.9 MMOL/L (ref 3.5–5.1)
Q-T INTERVAL, ECG07: 320 MS
QRS DURATION, ECG06: 78 MS
QTC CALCULATION (BEZET), ECG08: 458 MS
RBC # BLD AUTO: 4.31 M/UL (ref 3.8–5.2)
SAO2 % BLD: 97 % (ref 92–97)
SERVICE CMNT-IMP: ABNORMAL
SODIUM SERPL-SCNC: 140 MMOL/L (ref 136–145)
SODIUM SERPL-SCNC: 141 MMOL/L (ref 136–145)
SODIUM SERPL-SCNC: 142 MMOL/L (ref 136–145)
SODIUM SERPL-SCNC: 144 MMOL/L (ref 136–145)
SODIUM SERPL-SCNC: 144 MMOL/L (ref 136–145)
SPECIMEN TYPE: ABNORMAL
TROPONIN I SERPL-MCNC: <0.04 NG/ML
TROPONIN I SERPL-MCNC: <0.04 NG/ML
VENTRICULAR RATE, ECG03: 123 BPM
WBC # BLD AUTO: 13.3 K/UL (ref 3.6–11)

## 2017-12-09 PROCEDURE — 74011250636 HC RX REV CODE- 250/636: Performed by: INTERNAL MEDICINE

## 2017-12-09 PROCEDURE — 82962 GLUCOSE BLOOD TEST: CPT

## 2017-12-09 PROCEDURE — 36415 COLL VENOUS BLD VENIPUNCTURE: CPT | Performed by: FAMILY MEDICINE

## 2017-12-09 PROCEDURE — 74011250637 HC RX REV CODE- 250/637: Performed by: INTERNAL MEDICINE

## 2017-12-09 PROCEDURE — 80048 BASIC METABOLIC PNL TOTAL CA: CPT | Performed by: FAMILY MEDICINE

## 2017-12-09 PROCEDURE — 36600 WITHDRAWAL OF ARTERIAL BLOOD: CPT

## 2017-12-09 PROCEDURE — 83735 ASSAY OF MAGNESIUM: CPT | Performed by: FAMILY MEDICINE

## 2017-12-09 PROCEDURE — 84484 ASSAY OF TROPONIN QUANT: CPT | Performed by: FAMILY MEDICINE

## 2017-12-09 PROCEDURE — 74011000258 HC RX REV CODE- 258: Performed by: FAMILY MEDICINE

## 2017-12-09 PROCEDURE — 83605 ASSAY OF LACTIC ACID: CPT | Performed by: FAMILY MEDICINE

## 2017-12-09 PROCEDURE — 74011000258 HC RX REV CODE- 258: Performed by: INTERNAL MEDICINE

## 2017-12-09 PROCEDURE — 74011258636 HC RX REV CODE- 258/636: Performed by: INTERNAL MEDICINE

## 2017-12-09 PROCEDURE — 74011636637 HC RX REV CODE- 636/637: Performed by: INTERNAL MEDICINE

## 2017-12-09 PROCEDURE — 74011000250 HC RX REV CODE- 250: Performed by: FAMILY MEDICINE

## 2017-12-09 PROCEDURE — 74011250636 HC RX REV CODE- 250/636: Performed by: FAMILY MEDICINE

## 2017-12-09 PROCEDURE — 71010 XR CHEST PORT: CPT

## 2017-12-09 PROCEDURE — 65610000006 HC RM INTENSIVE CARE

## 2017-12-09 PROCEDURE — 84100 ASSAY OF PHOSPHORUS: CPT | Performed by: FAMILY MEDICINE

## 2017-12-09 PROCEDURE — 74011000250 HC RX REV CODE- 250: Performed by: INTERNAL MEDICINE

## 2017-12-09 PROCEDURE — 83036 HEMOGLOBIN GLYCOSYLATED A1C: CPT | Performed by: FAMILY MEDICINE

## 2017-12-09 PROCEDURE — 85025 COMPLETE CBC W/AUTO DIFF WBC: CPT | Performed by: FAMILY MEDICINE

## 2017-12-09 PROCEDURE — 82803 BLOOD GASES ANY COMBINATION: CPT

## 2017-12-09 RX ORDER — SODIUM CHLORIDE AND POTASSIUM CHLORIDE .9; .15 G/100ML; G/100ML
SOLUTION INTRAVENOUS CONTINUOUS
Status: DISCONTINUED | OUTPATIENT
Start: 2017-12-09 | End: 2017-12-09

## 2017-12-09 RX ORDER — POTASSIUM CHLORIDE 750 MG/1
40 TABLET, FILM COATED, EXTENDED RELEASE ORAL
Status: COMPLETED | OUTPATIENT
Start: 2017-12-09 | End: 2017-12-09

## 2017-12-09 RX ORDER — LORAZEPAM 2 MG/ML
1 INJECTION INTRAMUSCULAR
Status: DISCONTINUED | OUTPATIENT
Start: 2017-12-09 | End: 2017-12-09

## 2017-12-09 RX ORDER — SODIUM CHLORIDE 9 MG/ML
200 INJECTION, SOLUTION INTRAVENOUS CONTINUOUS
Status: DISCONTINUED | OUTPATIENT
Start: 2017-12-09 | End: 2017-12-09

## 2017-12-09 RX ORDER — DEXTROSE MONOHYDRATE AND SODIUM CHLORIDE 5; .9 G/100ML; G/100ML
200 INJECTION, SOLUTION INTRAVENOUS CONTINUOUS
Status: DISCONTINUED | OUTPATIENT
Start: 2017-12-09 | End: 2017-12-09

## 2017-12-09 RX ORDER — ACETAMINOPHEN 325 MG/1
650 TABLET ORAL
Status: DISCONTINUED | OUTPATIENT
Start: 2017-12-09 | End: 2017-12-11 | Stop reason: HOSPADM

## 2017-12-09 RX ORDER — DEXTROSE, SODIUM CHLORIDE, AND POTASSIUM CHLORIDE 5; .9; .15 G/100ML; G/100ML; G/100ML
200 INJECTION INTRAVENOUS CONTINUOUS
Status: DISCONTINUED | OUTPATIENT
Start: 2017-12-09 | End: 2017-12-09

## 2017-12-09 RX ORDER — OXYCODONE HYDROCHLORIDE 5 MG/1
5 TABLET ORAL ONCE
Status: COMPLETED | OUTPATIENT
Start: 2017-12-09 | End: 2017-12-09

## 2017-12-09 RX ORDER — LORAZEPAM 2 MG/ML
2 INJECTION INTRAMUSCULAR
Status: DISCONTINUED | OUTPATIENT
Start: 2017-12-09 | End: 2017-12-09

## 2017-12-09 RX ADMIN — Medication 10 ML: at 14:00

## 2017-12-09 RX ADMIN — DEXTROSE MONOHYDRATE AND SODIUM CHLORIDE 200 ML/HR: 5; .9 INJECTION, SOLUTION INTRAVENOUS at 10:03

## 2017-12-09 RX ADMIN — SODIUM CHLORIDE 1.4 UNITS/HR: 900 INJECTION, SOLUTION INTRAVENOUS at 20:33

## 2017-12-09 RX ADMIN — ACETAMINOPHEN 650 MG: 325 TABLET, FILM COATED ORAL at 17:44

## 2017-12-09 RX ADMIN — Medication 10 ML: at 22:00

## 2017-12-09 RX ADMIN — ACETAMINOPHEN 650 MG: 325 TABLET, FILM COATED ORAL at 08:55

## 2017-12-09 RX ADMIN — ONDANSETRON 4 MG: 2 INJECTION INTRAMUSCULAR; INTRAVENOUS at 22:32

## 2017-12-09 RX ADMIN — SODIUM BICARBONATE: 84 INJECTION, SOLUTION INTRAVENOUS at 00:09

## 2017-12-09 RX ADMIN — OXYCODONE HYDROCHLORIDE 5 MG: 5 TABLET ORAL at 20:45

## 2017-12-09 RX ADMIN — SODIUM CHLORIDE: 900 INJECTION, SOLUTION INTRAVENOUS at 14:01

## 2017-12-09 RX ADMIN — POTASSIUM CHLORIDE: 149 INJECTION, SOLUTION, CONCENTRATE INTRAVENOUS at 19:52

## 2017-12-09 RX ADMIN — DEXTROSE MONOHYDRATE, SODIUM CHLORIDE, AND POTASSIUM CHLORIDE 200 ML/HR: 50; 9; 1.49 INJECTION, SOLUTION INTRAVENOUS at 13:58

## 2017-12-09 RX ADMIN — SODIUM CHLORIDE 200 ML/HR: 900 INJECTION, SOLUTION INTRAVENOUS at 08:55

## 2017-12-09 RX ADMIN — POTASSIUM CHLORIDE 40 MEQ: 750 TABLET, FILM COATED, EXTENDED RELEASE ORAL at 13:58

## 2017-12-09 RX ADMIN — Medication 10 ML: at 05:29

## 2017-12-09 NOTE — ROUTINE PROCESS
Bedside, Verbal and Written shift change report given to Irving Goodpasture, RN (oncoming nurse) by Shankar Espinosa RN (offgoing nurse). Report included the following information SBAR, Kardex, ED Summary, Procedure Summary, Intake/Output and MAR.

## 2017-12-09 NOTE — H&P
[address] [facility] [address]   HISTORY AND PHYSICAL       Name:  Yessy Harmon   MR#:  [id number]   :  1995   Account #:  [de-identified]        Date of Adm:  2017       ATTENDING PHYSICIAN: Ria Sheridan MD    CHIEF COMPLAINT: Elevated blood glucose. HISTORY OF PRESENT ILLNESS: The patient is a 32-year-old   female with past medical history of insulin-dependent diabetes mellitus   type 1, currently on Lantus and NovoLog at home. The patient came   via EMS secondary to high blood glucose, lethargic, and sleepy. History was obtained from the patient as well as patient's mother. The   patient reports that her blood glucose level was more than 450 today   per her mother. The patient came to the hospital. She was having   some nausea associated with generalized weakness, abdominal pain,   slight shortness of breath, and just \"not feeling right. \" Mother reports   that for the past 1 week, the patient has been having episodes of   nausea and vomiting, but no diarrhea. The patient reports that she has   been taking her insulin on a regular basis, but \"can't get a handle on   her sugars. \" The patient reports that she was seen at [facility] ED about   last week for nausea and vomiting, but was discharged without any   significant intervention. The patient denied any other complaints or   problems. The patient was found to have a pH of 6.8. The patient was   found to be in DKA in the ER and was found to have a pH of 6.827   on ABG. The patient was requested to be admitted under the   hospitalist service. The patient denies any other complaints or   problems. Denies any headache, blurry vision, sore throat, trouble   swallowing, trouble with speech, any chest pain. Does admit to some   shortness of breath.  Denies any cough, fever, chills, urinary symptoms,   vaginal bleeding, vaginal discharge, any focal or generalized   neurological weakness, recent travel, sick contacts, falls, injuries,   hematemesis, melena, hemoptysis or any other concerns or problems. PAST MEDICAL HISTORY: See above. HOME MEDICATIONS: CURRENTLY THE PATIENT IS ON   1. NovoLog sliding scale. 2. Lantus 42 units daily. SOCIAL HISTORY: Denies tobacco abuse. Occasional alcohol use. Denies IV drug abuse. Lives at home. FAMILY HISTORY: Mother and hypertension, elevated lipid and   father with history of lupus. REVIEW OF SYSTEMS: All symptoms were reviewed and were found   to be essentially negative except for the symptoms mentioned above. ALLERGIES: NO KNOWN DRUG ALLERGIES. PHYSICAL EXAMINATION   VITAL SIGNS: Temperature 98.4, pulse 121, respiratory rate 21, blood   pressure 136/85, pulse oximetry 100% room air. GENERAL: Alert x3, awake, lethargic female, appears to be stated   age. HEENT: Pupils equal and reactive to light. Extremely dry mucous   membranes. Tympanic membranes clear. NECK: Supple. CHEST: Clear to auscultation bilaterally. CORONARY: Tachycardic. ABDOMEN: Soft, tender to palpation diffusely. No rebound. Mild   guarding. Bowel sounds were hypoactive. EXTREMITIES: No clubbing, no cyanosis, no edema. NEUROPSYCHIATRIC: Pleasant mood and affect. Cranial nerves 2-  12 grossly intact. Sensory grossly within normal limits. DTR 2+. Strength 5/5. SKIN: Warm. LABORATORY: White count 10.4, hemoglobin 12.8, hematocrit 42.1,   platelets 367. Urine shows no signs of infection. Sodium 133,   potassium 5.2, chloride 103, bicarbonate less than 5, glucose of 629,   BUN 10, creatinine 0.87, calcium 8.4, bilirubin total 0.4, ALT 74, AST   36, alkaline phosphatase 215. Hemoglobin A1c is 11.8. ABG shows a   pH of 6.827, pCO2 of less than 70, pO2 of 142. X-ray of the chest shows no acute abnormality. EKG shows sinus tachycardia. ASSESSMENT AND PLAN   1. Diabetic ketoacidosis, severe. Patient will be admitted to the   intensive care unit.  The patient is in severe anion gap acidosis at this   point of time. Will start patient on aggressive IV hydration. I told ER to   give her 2 more liters of normal saline in addition to the 2 liters that she   has already received. Will give patient 1 amp of bicarbonate stat and   start patient on bicarbonate drip. Will provide aggressive IV hydration. Put patient on an insulin drip. We will get a CT of the abdomen and   pelvis to rule out any occult infections. We will provide supportive care   and continue to closely monitor BMP every 4 hours. Repeat ABG in 4   hours. I spoke with Dr. Susie Doty from Nephrology regarding the   acidosis and he agrees with the plan. Requests aggressive hydration. Will provide pain control and continue to monitor. Once blood glucose   stabilizes, will switch patient to D5 and then to schedule insulin. I   spoke to the mom and explained to her that patient is in a critical state   and has high risk for decompensation. The mother understands. Will   continue to monitor. 2. Hyperkalemia, most likely secondary to #1. Will provide IV hydration,   repeat BMP in 4 hours and continue to closely monitor. 3. Lactic acidosis, see above. Continue to monitor patient on   bicarbonate drip. 4. Pseudohyponatremia. Will provide aggressive IV hydration, continue   to monitor. Neurovascular checks have been ordered. 5. Gastrointestinal and deep vein thrombosis prophylaxis. The patient   is on sequential compression devices.          Laura Starks MD MM / JULIUS   D:  12/08/2017   21:17   T:  12/08/2017   22:03   Job #:  [id number]

## 2017-12-09 NOTE — ROUTINE PROCESS
TRANSFER - OUT REPORT:    Verbal report given to Juev Seay RN (name) on Annie Ohs  being transferred to ICU (unit) for routine progression of care       Report consisted of patients Situation, Background, Assessment and   Recommendations(SBAR). Information from the following report(s) SBAR, ED Summary and MAR was reviewed with the receiving nurse. Lines:   Peripheral IV 12/08/17 Left Forearm (Active)   Site Assessment Clean, dry, & intact 12/8/2017  7:52 PM   Phlebitis Assessment 0 12/8/2017  7:52 PM   Infiltration Assessment 0 12/8/2017  7:52 PM   Dressing Status Clean, dry, & intact 12/8/2017  7:52 PM        Opportunity for questions and clarification was provided. Patient transported with:    RN    Patient will be transferred once MD has placed admisson orders.

## 2017-12-09 NOTE — PROGRESS NOTES
Hospitalist Progress Note  Karen Roland MD  Answering service: [phone] OR 36 from in house phone        Date of Service:  2017  NAME:  Lyndsay Nur  :  1995  MRN:  [id number]      Admission Summary:      patient is a 50-year-old   female with past medical history of insulin-dependent diabetes mellitus   type 1, currently on Lantus and NovoLog at home. The patient came   via EMS secondary to high blood glucose, lethargic, and sleepy. Interval history / Subjective:       Still is very dry and weak and feels still very sick   Not vomiting today      Assessment & Plan:       DKA   She is type 1 diabetic   Started on insulin gtt yesterday , continue the same as the gap not closed yet   Needs more iv fluids ,  CT abd is negative for acute path , so likly from viral syndrome   She will be slow to recover   Will give a day at least on gtt     High AGMA , from DKA , will stop bicarb gtt as ph above 7 now , and just needs treatment for DKA and young patients actually recover from acidosis without bicarb and use is very controversial     Hyperkalemia and now hypokalemia :   Added k to iv now     pseudohyponatremia   Improved with insulin gtt     Code status: full   DVT prophylaxis:     Care Plan discussed with: Patient/Family  Disposition: Home w/Family and TBD     Hospital Problems  Date Reviewed: 2017          Codes Class Noted POA    * (Principal)D[facility]acidoses) [facility] ICD-10-CM: E13.10  ICD-9-CM: 250.10  2017 Unknown                Review of Systems:   A comprehensive review of systems was negative except for that written in the HPI. Vital Signs:    Last 24hrs VS reviewed since prior progress note.  Most recent are:  Visit Vitals    /66    Pulse (!) 109    Temp 98.4 °F (36.9 °C)    Resp 14    Wt 66.6 kg (146 lb 13.2 oz)    SpO2 98%    BMI 23.7 kg/m2         Intake/Output Summary (Last 24 hours) at 12/09/17 1119  Last data filed at 12/09/17 1100   Gross per 24 hour   Intake          1891.25 ml   Output             2450 ml   Net          -558.75 ml        Physical Examination:             Constitutional:  No acute distress,looking very dehydrated , and weak . Dry mucous membranes    ENT:  Oral mucous moist, oropharynx benign. Neck supple,    Resp:  CTA bilaterally. No wheezing/rhonchi/rales. No accessory muscle use   CV:  Regular rhythm, normal rate, no murmurs, gallops, rubs    GI:  Soft, non distended, non tender. normoactive bowel sounds, no hepatosplenomegaly     Musculoskeletal:  No edema, warm, 2+ pulses throughout    Neurologic:  Moves all extremities. AAOx3, CN II-XII reviewed            Data Review:    Review and/or order of clinical lab test      Labs:     Recent Labs      12/09/17 0015 12/08/17 1822   WBC  13.3*  10.4   HGB  11.9  12.8   HCT  37.2  42.1   PLT  379  450*     Recent Labs      12/09/17 0926 12/09/17 0527 12/09/17   0015   NA  141  142  144   K  3.1*  3.9  3.9   CL  111*  113*  117*   CO2  13*  13*  9*   BUN  6  5*  7   CREA  1.02  0.77  0.66   GLU  182*  163*  154*   CA  7.9*  7.7*  7.7*   MG  1.6  1.7  1.9   PHOS   --    --   1.5*     Recent Labs      12/08/17   1822   SGOT  36   ALT  74   AP  215*   TBILI  0.4   TP  8.0   ALB  3.2*   GLOB  4.8*     No results for input(s): INR, PTP, APTT in the last 72 hours. No lab exists for component: INREXT   No results for input(s): FE, TIBC, PSAT, FERR in the last 72 hours. No results found for: FOL, RBCF   No results for input(s): PH, PCO2, PO2 in the last 72 hours.   Recent Labs      12/09/17 0526 12/09/17 0015   TROIQ  <0.04  <0.04     No results found for: CHOL, CHOLX, CHLST, CHOLV, HDL, LDL, LDLC, DLDLP, TGLX, TRIGL, TRIGP, CHHD, CHHDX  Lab Results   Component Value Date/Time    Glucose (POC) 160 12/09/2017 10:40 AM    Glucose (POC) 141 12/09/2017 09:37 AM    Glucose (POC) 253 12/09/2017 08:35 AM    Glucose (POC) 264 12/09/2017 07:33 AM    Glucose (POC) 133 12/09/2017 06:30 AM     Lab Results   Component Value Date/Time    Color YELLOW/STRAW 12/08/2017 06:22 PM    Appearance CLEAR 12/08/2017 06:22 PM    Specific gravity 1.022 12/08/2017 06:22 PM    Specific gravity 1.010 11/28/2017 11:55 AM    pH (UA) 5.0 12/08/2017 06:22 PM    Protein TRACE 12/08/2017 06:22 PM    Glucose >1000 12/08/2017 06:22 PM    Ketone >80 12/08/2017 06:22 PM    Bilirubin NEGATIVE  12/08/2017 06:22 PM    Urobilinogen 0.2 12/08/2017 06:22 PM    Nitrites NEGATIVE  12/08/2017 06:22 PM    Leukocyte Esterase NEGATIVE  12/08/2017 06:22 PM    Epithelial cells FEW 12/08/2017 06:22 PM    Bacteria NEGATIVE  12/08/2017 06:22 PM    WBC 0-4 12/08/2017 06:22 PM    RBC 0-5 12/08/2017 06:22 PM         Medications Reviewed:     Current Facility-Administered Medications   Medication Dose Route Frequency    acetaminophen (TYLENOL) tablet 650 mg  650 mg Oral Q4H PRN    dextrose 5% and 0.9% NaCl infusion  200 mL/hr IntraVENous CONTINUOUS    0.9% sodium chloride infusion  200 mL/hr IntraVENous CONTINUOUS    dextrose 5% - 0.9% NaCl with KCl 20 mEq/L infusion  200 mL/hr IntraVENous CONTINUOUS    0.9% sodium chloride with KCl 20 mEq/L infusion   IntraVENous CONTINUOUS    sodium chloride (NS) flush 5-10 mL  5-10 mL IntraVENous Q8H    sodium chloride (NS) flush 5-10 mL  5-10 mL IntraVENous PRN    insulin regular (NOVOLIN R, HUMULIN R) 100 Units in 0.9% sodium chloride 100 mL infusion  0-50 Units/hr IntraVENous TITRATE    glucose chewable tablet 16 g  4 Tab Oral PRN    dextrose (D50W) injection syrg 12.5-25 g  12.5-25 g IntraVENous PRN    glucagon (GLUCAGEN) injection 1 mg  1 mg IntraMUSCular PRN    ondansetron (ZOFRAN) injection 4 mg  4 mg IntraVENous Q4H PRN    insulin lispro (HUMALOG) injection   SubCUTAneous TIDAC     ______________________________________________________________________  EXPECTED LENGTH OF STAY: - - -  ACTUAL LENGTH OF STAY:          1 Mychal Nicholson MD

## 2017-12-09 NOTE — PROGRESS NOTES
1630. Unable to obtain blood sample from patient despite multiple attempts from multiple nurses. Dr. Deanna Castro notified and order obtained for 1x art stick. Will obtain BMP and notify MD of results. 1752. Dr. Deanna Castro notified of patient's critical C02 of 12. MD updated on patient status and inability lab work with current vascular status. Orders received to insert central line. Will monitor closely. 1821. Spoke with pharmacy about patient's glucostabilizer and parameters. Pharmacy states they are unsure about how to use the glucostabilizer. Spoke with Dr. Deanna Castro and updated her. Orders received.     1900. Anesthesia at the bedside to place central line.

## 2017-12-09 NOTE — ED PROVIDER NOTES
HPI Comments: 25 y.o. female with past medical history significant for DM who presents via EMS with chief complaint of high blood glucose level. Patient reports a history of Type 1 Diabetes; reports being on insulin. Patient reports her blood glucose level is \"more than 450 today. \" Patient reports nausea, generalized abdominal pain, slight SOB, and \"just feeling bad\" with onset \"this morning. \" Patient reports her blood glucose level was \"283 yesterday. \" Patient reports similarly elevated blood glucose levels, vomiting, and slight SOB \"last week,\" reports being seen in the [facility] ED at that time. Patient denies any recent changes to her insulin dosages; reports being compliant with her diabetic medications. Patient reports a history of DKA. Patient denies fever, hills, vomiting, dysuria, diarrhea, and constipation. There are no other acute medical concerns at this time. PCP: None    Note written by Tawnya Dean, as dictated by Erma Gee MD 6:14 PM     The history is provided by the patient. Past Medical History:   Diagnosis Date    Diabetes [facility])        History reviewed. No pertinent surgical history. Family History:   Problem Relation Age of Onset    Other Mother      Factor V Leiden with hx clots    Hypertension Mother     Elevated Lipids Mother     Lupus Father        Social History     Social History    Marital status: SINGLE     Spouse name: N/A    Number of children: N/A    Years of education: N/A     Occupational History    Not on file. Social History Main Topics    Smoking status: Never Smoker    Smokeless tobacco: Never Used    Alcohol use Yes      Comment: social    Drug use: No    Sexual activity: Not on file     Other Topics Concern    Not on file     Social History Narrative         ALLERGIES: Review of patient's allergies indicates no known allergies. Review of Systems   Constitutional: Negative for chills and fever.    HENT: Negative for rhinorrhea and sore throat. Respiratory: Positive for shortness of breath. Negative for cough. Cardiovascular: Negative for chest pain. Gastrointestinal: Positive for abdominal pain and nausea. Negative for constipation, diarrhea and vomiting. Genitourinary: Negative for dysuria and urgency. Musculoskeletal: Negative for arthralgias and back pain. Skin: Negative for rash. Neurological: Negative for dizziness, weakness and light-headedness. All other systems reviewed and are negative. There were no vitals filed for this visit. Physical Exam     Vital signs reviewed. Nursing notes reviewed. Const:  No acute distress, well developed, well nourished  Head:  Atraumatic, normocephalic; dry mucous membranes; dry lips. Eyes:  PERRL, conjunctiva normal, no scleral icterus  Neck:  Supple, trachea midline  Cardiovascular:  Tachycardic. Resp:  Tachypneic. Abd:  Soft, non-tender, non-distended, no rebound, no guarding, no CVA tenderness  :  Deferred  MSK:  No pedal edema, normal ROM  Neuro:  Alert and oriented x3, no cranial nerve defect  Skin:  Warm, dry, intact  Psych: normal mood and affect, behavior is normal, judgement and thought content is normal  Note written by Tawnya Beckham, as dictated by King Alvaro MD 6:19 PM    MDM  Number of Diagnoses or Management Options  Diabetic ketoacidosis without coma associated with type 1 diabetes mellitus [facility]):      Amount and/or Complexity of Data Reviewed  Clinical lab tests: ordered and reviewed  Tests in the radiology section of CPT®: ordered and reviewed  Review and summarize past medical records: yes    Critical Care  Total time providing critical care: 30-74 minutes (35 min.)    Patient Progress  Patient progress: stable    ED Course     Pt. Presents to the ER with sx consistent with her DKA. I have started her on insulin and IVF. Pt.  To be evaluated for admission by the hospitalist.      Procedures    CONSULT NOTE:  7:43 PM Aida Bucio MD spoke with Dr. Rachele Renteria, Consult for Hospitalist. Discussed available diagnostic tests and clinical findings. Provider is in agreement with care plans as outlined. Dr. Rachele Renteria will see and admit the patient.

## 2017-12-09 NOTE — ROUTINE PROCESS
TRANSFER - IN REPORT:    Verbal report received from [id number] YENIFER Campos RN(name) on Ld Betancourt  being received from ED(unit) for routine progression of care      Report consisted of patients Situation, Background, Assessment and   Recommendations(SBAR). Information from the following report(s) SBAR, Kardex, Procedure Summary, Intake/Output, MAR and Recent Results was reviewed with the receiving nurse. Opportunity for questions and clarification was provided. Assessment completed upon patients arrival to unit and care assumed.

## 2017-12-09 NOTE — PROGRESS NOTES
Pt received from ED. SPB 140s, STAC, afebrile, on RA. Insulin gtt, NS bolus infusing. A&Ox4, c/o nausea, generalized body aches. CAM negative. 2300:  MD paged to add dextrose to fluid. 2328: Order received. 0400: No changes. Pt resting comfortably. Shift Summary: Uneventful shift. Insulin gtt/bicarb gtt infusing. Labs improving.         Primary Nurse Patricia Solorio RN and Ida Pitts RN performed a dual skin assessment on this patient No impairment noted  Дмитрий score is 20

## 2017-12-09 NOTE — CONSULTS
Consultation Note    NAME: Jesus Antoine   :  1995   MRN:  [id number]     Date/Time:  2017 1:32 PM    I have been asked to see this patient by Dr. Bandar Downs  for advice/opinion re: DKA. Assessment :    Plan:  DKA  Hypokalemia  hypophosphatemia Acidosis is improving with current therapy. Continue with labs as scheduled. Replete K po  Replete phos IV       Subjective:   CHIEF COMPLAINT:  \"My sugar got too high. \"    HISTORY OF PRESENT ILLNESS:     Anne Slade is a 25 y.o.   female who has a history of DM admitted with DKA. Her mother says that the patient is not particularly careful about regulating her sugar and that it runs in the 300's regularly. The mother says that the patient has been in the hospital more recvently as her sugar has been up. The patient says that she has had N/V. She says that she feels a little better today. Past Medical History:   Diagnosis Date    Diabetes [facility])       History reviewed. No pertinent surgical history. Social History   Substance Use Topics    Smoking status: Never Smoker    Smokeless tobacco: Never Used    Alcohol use Yes      Comment: social      Family History   Problem Relation Age of Onset    Other Mother      Factor V Leiden with hx clots    Hypertension Mother     Elevated Lipids Mother     Lupus Father       No Known Allergies   Prior to Admission medications    Medication Sig Start Date End Date Taking? Authorizing Provider   insulin aspart (NOVOLOG) 100 unit/mL injection 1 Units by SubCUTAneous route three (3) times daily (with meals). Patient injects 1 unit for every 8 grams of carbohydrates. 17   Aidan Kerr MD   insulin glargine (LANTUS) 100 unit/mL injection 42 Units by SubCUTAneous route daily.  17   Aidan Kerr MD     REVIEW OF SYSTEMS:     []  Unable to obtain reliable ROS due to  [] mental status  [] sedated   [] intubated   [x] Total of 12 systems reviewed as follows:  Constitutional: negative fever, negative chills, negative weight loss  Eyes:   negative visual changes  ENT:   negative sore throat, tongue or lip swelling  Respiratory:  negative cough, negative dyspnea  Cards:  negative for chest pain, palpitations, lower extremity edema  GI:   pos for nausea, vomiting,   :  negative for frequency, dysuria  Integument:  negative for rash and pruritus  Heme:  negative for easy bruising and gum/nose bleeding  Musculoskel: negative for myalgias,  back pain and muscle weakness  Neuro:  negative for headaches, dizziness, vertigo  Psych:  negative for feelings of anxiety, depression   Travel?: none    Objective:   VITALS:    Visit Vitals    /75    Pulse 99    Temp 98.4 °F (36.9 °C)    Resp 12    Wt 66.6 kg (146 lb 13.2 oz)    SpO2 100%    BMI 23.7 kg/m2     PHYSICAL EXAM:  Gen:  []  WD []  WN  [] cachectic []  thin []  obese []  disheveled             [x]  ill apearing  []   Critical  []   Chronic    []  No acute distress    HEENT:   [x] NC/AT/PERRL    [x] pink conjunctivae      [] pale conjunctivae                  PERRL  [] yes  [] no      [] moist mucosa    [] dry mucosa    hearing intact to voice [] yes  [] No                 NECK:   supple [x] yes  [] no        masses [] yes  [x] No               thyroid  []  non tender  []  tender    RESP:   [x] CTA bilaterally/no wheezing/rhonchi/rales/crackles    [] rhonchi bilaterally - no dullness  [] wheezing   [] rhonchi   [] crackles     use of accessory muscles [] yes [] no    CARD:   [x]  regular rate and rhythm/No murmurs/rubs/gallops    murmur  [] yes ()  [] no      Rubs  [] yes  [] no       Gallops [] yes  [] no    Rate []  regular  []  irregular        carotid bruits  [] Right  []  Left                 LE edema [] yes  [x] no           JVP  []  yes   []  no    ABD:    [x] soft/non distended/non tender/+bowel sounds/no HSM    []  Rigid    tenderness [] yes [] no   Liver enlargement  []  yes []  no                Spleen enlargement  []  yes []  no     distended []  yes [] no     bowel sound  [] hypoactive   [] hyperactive    LYMPH:    Neck []  yes [x]  no       Axillae []  yes [x]  no    SKIN:   Rashes []  yes   [x]  no    Ulcers []  yes   [x]  no               [] tight to palpitation    skin turgor []  good  [] poor  [] decreased               Cyanosis/clubbing []  yes []  no    NEUR:   [x] cranial nerves II-XII grossly intact       [] Cranial nerves deficit                 []  facial droop    []  slurred speech   [] aphasic     [] Strength normal     []  weakness  []  LUE  []   RUE/ []  LLE  []   RLE    follows commands  [x]  yes []  no           PSYCH:   insight [] poor [x] good   Alert and Oriented to  [x] person  [x] place  [x]  time                    [] depressed [] anxious [] agitated  [] lethargic [] stuporous  [] sedated     LAB DATA REVIEWED:    Recent Labs      12/09/17   0015  12/08/17   1822   WBC  13.3*  10.4   HGB  11.9  12.8   HCT  37.2  42.1   PLT  379  450*     Recent Labs      12/09/17   0926  12/09/17   0527  12/09/17   0015   NA  141  142  144   K  3.1*  3.9  3.9   CL  111*  113*  117*   CO2  13*  13*  9*   BUN  6  5*  7   CREA  1.02  0.77  0.66   GLU  182*  163*  154*   CA  7.9*  7.7*  7.7*   MG  1.6  1.7  1.9   PHOS   --    --   1.5*     Recent Labs      12/08/17   1822   SGOT  36   ALT  74   AP  215*   TBILI  0.4   ALB  3.2*   GLOB  4.8*     No results for input(s): INR, PTP, APTT in the last 72 hours. No lab exists for component: INREXT   No results for input(s): FE, TIBC, PSAT, FERR in the last 72 hours. No results for input(s): PH, PCO2, PO2 in the last 72 hours. No results for input(s): CPK, CKMB in the last 72 hours.     No lab exists for component: TROPONINI  Lab Results   Component Value Date/Time    Glucose (POC) 224 12/09/2017 12:44 PM    Glucose (POC) 234 12/09/2017 11:43 AM    Glucose (POC) 160 12/09/2017 10:40 AM    Glucose (POC) 141 12/09/2017 09:37 AM    Glucose (POC) 253 12/09/2017 08:35 AM       Procedures: see electronic medical records for all procedures/Xrays and details which were not copied into this note but were reviewed prior to creation of Plan.    ________________________________________________________________________       ___________________________________________________  Consulting Physician: Kayce Shepherd MD

## 2017-12-09 NOTE — PROGRESS NOTES
Admission Medication Reconciliation:    Information obtained from: Lu Carlos)    Significant PMH/Disease States:   Past Medical History:   Diagnosis Date    Diabetes [facility])        Chief Complaint for this Admission:  Hyperglycemia    Allergies:  Review of patient's allergies indicates no known allergies. Prior to Admission Medications:   Prior to Admission Medications   Prescriptions Last Dose Informant Patient Reported? Taking?   insulin aspart (NOVOLOG) 100 unit/mL injection   No No   Si Units by SubCUTAneous route three (3) times daily (with meals). Patient injects 1 unit for every 8 grams of carbohydrates. insulin glargine (LANTUS) 100 unit/mL injection   No No   Si Units by SubCUTAneous route daily. Facility-Administered Medications: None         Comments/Recommendations: Patient was sleeping. Mother provided allergy and medication information. No changes.

## 2017-12-10 LAB
ADMINISTERED INITIALS, ADMINIT: NORMAL
ANION GAP SERPL CALC-SCNC: 10 MMOL/L (ref 5–15)
ANION GAP SERPL CALC-SCNC: 14 MMOL/L (ref 5–15)
ANION GAP SERPL CALC-SCNC: 15 MMOL/L (ref 5–15)
BASOPHILS # BLD: 0 K/UL (ref 0–0.1)
BASOPHILS NFR BLD: 0 % (ref 0–1)
BUN SERPL-MCNC: 2 MG/DL (ref 6–20)
BUN/CREAT SERPL: 2 (ref 12–20)
BUN/CREAT SERPL: 2 (ref 12–20)
BUN/CREAT SERPL: 4 (ref 12–20)
CALCIUM SERPL-MCNC: 7.8 MG/DL (ref 8.5–10.1)
CALCIUM SERPL-MCNC: 8 MG/DL (ref 8.5–10.1)
CALCIUM SERPL-MCNC: 8.2 MG/DL (ref 8.5–10.1)
CHLORIDE SERPL-SCNC: 108 MMOL/L (ref 97–108)
CHLORIDE SERPL-SCNC: 110 MMOL/L (ref 97–108)
CHLORIDE SERPL-SCNC: 111 MMOL/L (ref 97–108)
CO2 SERPL-SCNC: 17 MMOL/L (ref 21–32)
CO2 SERPL-SCNC: 20 MMOL/L (ref 21–32)
CO2 SERPL-SCNC: 21 MMOL/L (ref 21–32)
CREAT SERPL-MCNC: 0.5 MG/DL (ref 0.55–1.02)
CREAT SERPL-MCNC: 0.89 MG/DL (ref 0.55–1.02)
CREAT SERPL-MCNC: 0.91 MG/DL (ref 0.55–1.02)
D50 ADMINISTERED, D50ADM: 0 ML
D50 ADMINISTERED, D50ADM: 11 ML
D50 ORDER, D50ORD: 0 ML
D50 ORDER, D50ORD: 11 ML
EOSINOPHIL # BLD: 0.1 K/UL (ref 0–0.4)
EOSINOPHIL NFR BLD: 1 % (ref 0–7)
ERYTHROCYTE [DISTWIDTH] IN BLOOD BY AUTOMATED COUNT: 16.5 % (ref 11.5–14.5)
GLSCOM COMMENTS: NORMAL
GLUCOSE BLD STRIP.AUTO-MCNC: 112 MG/DL (ref 65–100)
GLUCOSE BLD STRIP.AUTO-MCNC: 144 MG/DL (ref 65–100)
GLUCOSE BLD STRIP.AUTO-MCNC: 159 MG/DL (ref 65–100)
GLUCOSE BLD STRIP.AUTO-MCNC: 160 MG/DL (ref 65–100)
GLUCOSE BLD STRIP.AUTO-MCNC: 161 MG/DL (ref 65–100)
GLUCOSE BLD STRIP.AUTO-MCNC: 163 MG/DL (ref 65–100)
GLUCOSE BLD STRIP.AUTO-MCNC: 164 MG/DL (ref 65–100)
GLUCOSE BLD STRIP.AUTO-MCNC: 167 MG/DL (ref 65–100)
GLUCOSE BLD STRIP.AUTO-MCNC: 178 MG/DL (ref 65–100)
GLUCOSE BLD STRIP.AUTO-MCNC: 181 MG/DL (ref 65–100)
GLUCOSE BLD STRIP.AUTO-MCNC: 186 MG/DL (ref 65–100)
GLUCOSE BLD STRIP.AUTO-MCNC: 205 MG/DL (ref 65–100)
GLUCOSE BLD STRIP.AUTO-MCNC: 207 MG/DL (ref 65–100)
GLUCOSE BLD STRIP.AUTO-MCNC: 212 MG/DL (ref 65–100)
GLUCOSE BLD STRIP.AUTO-MCNC: 219 MG/DL (ref 65–100)
GLUCOSE BLD STRIP.AUTO-MCNC: 242 MG/DL (ref 65–100)
GLUCOSE BLD STRIP.AUTO-MCNC: 266 MG/DL (ref 65–100)
GLUCOSE BLD STRIP.AUTO-MCNC: 285 MG/DL (ref 65–100)
GLUCOSE BLD STRIP.AUTO-MCNC: 332 MG/DL (ref 65–100)
GLUCOSE BLD STRIP.AUTO-MCNC: 73 MG/DL (ref 65–100)
GLUCOSE SERPL-MCNC: 173 MG/DL (ref 65–100)
GLUCOSE SERPL-MCNC: 174 MG/DL (ref 65–100)
GLUCOSE SERPL-MCNC: 187 MG/DL (ref 65–100)
GLUCOSE, GLC: 112 MG/DL
GLUCOSE, GLC: 144 MG/DL
GLUCOSE, GLC: 159 MG/DL
GLUCOSE, GLC: 160 MG/DL
GLUCOSE, GLC: 161 MG/DL
GLUCOSE, GLC: 163 MG/DL
GLUCOSE, GLC: 164 MG/DL
GLUCOSE, GLC: 167 MG/DL
GLUCOSE, GLC: 178 MG/DL
GLUCOSE, GLC: 181 MG/DL
GLUCOSE, GLC: 186 MG/DL
GLUCOSE, GLC: 205 MG/DL
GLUCOSE, GLC: 207 MG/DL
GLUCOSE, GLC: 212 MG/DL
GLUCOSE, GLC: 219 MG/DL
GLUCOSE, GLC: 242 MG/DL
GLUCOSE, GLC: 266 MG/DL
GLUCOSE, GLC: 285 MG/DL
GLUCOSE, GLC: 332 MG/DL
GLUCOSE, GLC: 73 MG/DL
HCG UR QL: NEGATIVE
HCT VFR BLD AUTO: 33.5 % (ref 35–47)
HGB BLD-MCNC: 10.4 G/DL (ref 11.5–16)
HIGH TARGET, HITG: 250 MG/DL
INSULIN ADMINSTERED, INSADM: 0 UNITS/HOUR
INSULIN ADMINSTERED, INSADM: 0.1 UNITS/HOUR
INSULIN ADMINSTERED, INSADM: 0.1 UNITS/HOUR
INSULIN ADMINSTERED, INSADM: 0.5 UNITS/HOUR
INSULIN ADMINSTERED, INSADM: 0.8 UNITS/HOUR
INSULIN ADMINSTERED, INSADM: 1 UNITS/HOUR
INSULIN ADMINSTERED, INSADM: 1.1 UNITS/HOUR
INSULIN ADMINSTERED, INSADM: 1.2 UNITS/HOUR
INSULIN ADMINSTERED, INSADM: 1.5 UNITS/HOUR
INSULIN ADMINSTERED, INSADM: 1.5 UNITS/HOUR
INSULIN ADMINSTERED, INSADM: 1.8 UNITS/HOUR
INSULIN ADMINSTERED, INSADM: 2 UNITS/HOUR
INSULIN ADMINSTERED, INSADM: 2.1 UNITS/HOUR
INSULIN ADMINSTERED, INSADM: 2.3 UNITS/HOUR
INSULIN ADMINSTERED, INSADM: 2.4 UNITS/HOUR
INSULIN ADMINSTERED, INSADM: 2.5 UNITS/HOUR
INSULIN ADMINSTERED, INSADM: 2.7 UNITS/HOUR
INSULIN ADMINSTERED, INSADM: 4.1 UNITS/HOUR
INSULIN ORDER, INSORD: 0 UNITS/HOUR
INSULIN ORDER, INSORD: 0.1 UNITS/HOUR
INSULIN ORDER, INSORD: 0.1 UNITS/HOUR
INSULIN ORDER, INSORD: 0.5 UNITS/HOUR
INSULIN ORDER, INSORD: 0.8 UNITS/HOUR
INSULIN ORDER, INSORD: 1 UNITS/HOUR
INSULIN ORDER, INSORD: 1.1 UNITS/HOUR
INSULIN ORDER, INSORD: 1.2 UNITS/HOUR
INSULIN ORDER, INSORD: 1.5 UNITS/HOUR
INSULIN ORDER, INSORD: 1.5 UNITS/HOUR
INSULIN ORDER, INSORD: 1.8 UNITS/HOUR
INSULIN ORDER, INSORD: 2 UNITS/HOUR
INSULIN ORDER, INSORD: 2.1 UNITS/HOUR
INSULIN ORDER, INSORD: 2.3 UNITS/HOUR
INSULIN ORDER, INSORD: 2.4 UNITS/HOUR
INSULIN ORDER, INSORD: 2.5 UNITS/HOUR
INSULIN ORDER, INSORD: 2.7 UNITS/HOUR
INSULIN ORDER, INSORD: 4.1 UNITS/HOUR
LOW TARGET, LOT: 150 MG/DL
LYMPHOCYTES # BLD: 2.7 K/UL (ref 0.8–3.5)
LYMPHOCYTES NFR BLD: 45 % (ref 12–49)
MAGNESIUM SERPL-MCNC: 1.4 MG/DL (ref 1.6–2.4)
MAGNESIUM SERPL-MCNC: 1.5 MG/DL (ref 1.6–2.4)
MAGNESIUM SERPL-MCNC: 1.8 MG/DL (ref 1.6–2.4)
MCH RBC QN AUTO: 26.8 PG (ref 26–34)
MCHC RBC AUTO-ENTMCNC: 31 G/DL (ref 30–36.5)
MCV RBC AUTO: 86.3 FL (ref 80–99)
MINUTES UNTIL NEXT BG, NBG: 120 MIN
MINUTES UNTIL NEXT BG, NBG: 15 MIN
MINUTES UNTIL NEXT BG, NBG: 60 MIN
MONOCYTES # BLD: 0.5 K/UL (ref 0–1)
MONOCYTES NFR BLD: 8 % (ref 5–13)
MULTIPLIER, MUL: 0
MULTIPLIER, MUL: 0.01
MULTIPLIER, MUL: 0.02
NEUTS SEG # BLD: 2.8 K/UL (ref 1.8–8)
NEUTS SEG NFR BLD: 46 % (ref 32–75)
ORDER INITIALS, ORDINIT: NORMAL
PHOSPHATE SERPL-MCNC: 2.3 MG/DL (ref 2.6–4.7)
PLATELET # BLD AUTO: 283 K/UL (ref 150–400)
POTASSIUM SERPL-SCNC: 3.2 MMOL/L (ref 3.5–5.1)
POTASSIUM SERPL-SCNC: 3.6 MMOL/L (ref 3.5–5.1)
POTASSIUM SERPL-SCNC: 3.6 MMOL/L (ref 3.5–5.1)
RBC # BLD AUTO: 3.88 M/UL (ref 3.8–5.2)
SERVICE CMNT-IMP: ABNORMAL
SERVICE CMNT-IMP: NORMAL
SODIUM SERPL-SCNC: 141 MMOL/L (ref 136–145)
SODIUM SERPL-SCNC: 142 MMOL/L (ref 136–145)
SODIUM SERPL-SCNC: 143 MMOL/L (ref 136–145)
WBC # BLD AUTO: 6 K/UL (ref 3.6–11)

## 2017-12-10 PROCEDURE — 74011250637 HC RX REV CODE- 250/637: Performed by: INTERNAL MEDICINE

## 2017-12-10 PROCEDURE — 74011250636 HC RX REV CODE- 250/636: Performed by: INTERNAL MEDICINE

## 2017-12-10 PROCEDURE — 65660000001 HC RM ICU INTERMED STEPDOWN

## 2017-12-10 PROCEDURE — 82962 GLUCOSE BLOOD TEST: CPT

## 2017-12-10 PROCEDURE — 80048 BASIC METABOLIC PNL TOTAL CA: CPT | Performed by: FAMILY MEDICINE

## 2017-12-10 PROCEDURE — 74011000250 HC RX REV CODE- 250: Performed by: FAMILY MEDICINE

## 2017-12-10 PROCEDURE — 83735 ASSAY OF MAGNESIUM: CPT | Performed by: FAMILY MEDICINE

## 2017-12-10 PROCEDURE — 84100 ASSAY OF PHOSPHORUS: CPT | Performed by: FAMILY MEDICINE

## 2017-12-10 PROCEDURE — 74011258636 HC RX REV CODE- 258/636: Performed by: INTERNAL MEDICINE

## 2017-12-10 PROCEDURE — 85025 COMPLETE CBC W/AUTO DIFF WBC: CPT | Performed by: INTERNAL MEDICINE

## 2017-12-10 PROCEDURE — 36415 COLL VENOUS BLD VENIPUNCTURE: CPT | Performed by: FAMILY MEDICINE

## 2017-12-10 PROCEDURE — 81025 URINE PREGNANCY TEST: CPT | Performed by: INTERNAL MEDICINE

## 2017-12-10 PROCEDURE — 74011250636 HC RX REV CODE- 250/636: Performed by: FAMILY MEDICINE

## 2017-12-10 PROCEDURE — 36592 COLLECT BLOOD FROM PICC: CPT

## 2017-12-10 PROCEDURE — 74011636637 HC RX REV CODE- 636/637: Performed by: FAMILY MEDICINE

## 2017-12-10 RX ORDER — SODIUM CHLORIDE 0.9 % (FLUSH) 0.9 %
10-40 SYRINGE (ML) INJECTION EVERY 8 HOURS
Status: DISCONTINUED | OUTPATIENT
Start: 2017-12-10 | End: 2017-12-11 | Stop reason: HOSPADM

## 2017-12-10 RX ORDER — SODIUM,POTASSIUM PHOSPHATES 280-250MG
1 POWDER IN PACKET (EA) ORAL 4 TIMES DAILY
Status: DISCONTINUED | OUTPATIENT
Start: 2017-12-10 | End: 2017-12-11 | Stop reason: HOSPADM

## 2017-12-10 RX ORDER — SODIUM CHLORIDE 0.9 % (FLUSH) 0.9 %
10 SYRINGE (ML) INJECTION AS NEEDED
Status: DISCONTINUED | OUTPATIENT
Start: 2017-12-10 | End: 2017-12-11 | Stop reason: HOSPADM

## 2017-12-10 RX ORDER — POTASSIUM CHLORIDE 750 MG/1
40 TABLET, FILM COATED, EXTENDED RELEASE ORAL
Status: COMPLETED | OUTPATIENT
Start: 2017-12-10 | End: 2017-12-10

## 2017-12-10 RX ORDER — BACITRACIN 500 UNIT/G
1 PACKET (EA) TOPICAL AS NEEDED
Status: DISCONTINUED | OUTPATIENT
Start: 2017-12-10 | End: 2017-12-11 | Stop reason: HOSPADM

## 2017-12-10 RX ORDER — SODIUM CHLORIDE 0.9 % (FLUSH) 0.9 %
10 SYRINGE (ML) INJECTION EVERY 24 HOURS
Status: DISCONTINUED | OUTPATIENT
Start: 2017-12-10 | End: 2017-12-11 | Stop reason: HOSPADM

## 2017-12-10 RX ADMIN — ACETAMINOPHEN 650 MG: 325 TABLET, FILM COATED ORAL at 12:08

## 2017-12-10 RX ADMIN — Medication 10 ML: at 14:25

## 2017-12-10 RX ADMIN — POTASSIUM & SODIUM PHOSPHATES POWDER PACK 280-160-250 MG 1 PACKET: 280-160-250 PACK at 12:08

## 2017-12-10 RX ADMIN — POTASSIUM & SODIUM PHOSPHATES POWDER PACK 280-160-250 MG 1 PACKET: 280-160-250 PACK at 21:13

## 2017-12-10 RX ADMIN — POTASSIUM & SODIUM PHOSPHATES POWDER PACK 280-160-250 MG 1 PACKET: 280-160-250 PACK at 17:35

## 2017-12-10 RX ADMIN — POTASSIUM & SODIUM PHOSPHATES POWDER PACK 280-160-250 MG 1 PACKET: 280-160-250 PACK at 11:18

## 2017-12-10 RX ADMIN — POTASSIUM CHLORIDE: 149 INJECTION, SOLUTION, CONCENTRATE INTRAVENOUS at 05:17

## 2017-12-10 RX ADMIN — ONDANSETRON 4 MG: 2 INJECTION INTRAMUSCULAR; INTRAVENOUS at 12:08

## 2017-12-10 RX ADMIN — POTASSIUM CHLORIDE: 149 INJECTION, SOLUTION, CONCENTRATE INTRAVENOUS at 19:25

## 2017-12-10 RX ADMIN — ACETAMINOPHEN 650 MG: 325 TABLET, FILM COATED ORAL at 02:55

## 2017-12-10 RX ADMIN — Medication 10 ML: at 06:35

## 2017-12-10 RX ADMIN — DEXTROSE MONOHYDRATE 5.5 G: 25 INJECTION, SOLUTION INTRAVENOUS at 03:57

## 2017-12-10 RX ADMIN — ONDANSETRON 4 MG: 2 INJECTION INTRAMUSCULAR; INTRAVENOUS at 06:33

## 2017-12-10 RX ADMIN — Medication 10 ML: at 21:14

## 2017-12-10 RX ADMIN — POTASSIUM CHLORIDE 40 MEQ: 750 TABLET, FILM COATED, EXTENDED RELEASE ORAL at 11:13

## 2017-12-10 RX ADMIN — Medication 30 ML: at 14:25

## 2017-12-10 RX ADMIN — ONDANSETRON 4 MG: 2 INJECTION INTRAMUSCULAR; INTRAVENOUS at 21:17

## 2017-12-10 RX ADMIN — INSULIN LISPRO 2 UNITS: 100 INJECTION, SOLUTION INTRAVENOUS; SUBCUTANEOUS at 17:34

## 2017-12-10 NOTE — ROUTINE PROCESS
0730: Bedside and Verbal shift change report given to Rhonda Jj RN (oncoming nurse) by Morales Clemons RN (offgoing nurse). Report included the following information SBAR, Kardex, Intake/Output, MAR, Recent Results, Cardiac Rhythm NSR and Alarm Parameters . 1930: Bedside and Verbal shift change report given to Morales Clemons RN (oncoming nurse) by Rhonda Jj RN (offgoing nurse). Report included the following information SBAR, Kardex, Procedure Summary, Intake/Output, MAR, Recent Results, Cardiac Rhythm Sinus Tachycardic and Alarm Parameters .

## 2017-12-10 NOTE — PROGRESS NOTES
Shift Summary: Insulin gtt, VSS, A&O x4, PERRL, room air, up with one assist, voiding in commode, CAM negative, right central line. PRN Tylenol given x1. PRN Zofran given x1. Seen by Hospitalist and Nephrology. Transfer orders to [facility].

## 2017-12-10 NOTE — PROGRESS NOTES
Problem: Falls - Risk of  Goal: *Absence of Falls  Document Christina Fall Risk and appropriate interventions in the flowsheet.    Outcome: Progressing Towards Goal  Fall Risk Interventions:  Mobility Interventions: Patient to call before getting OOB         Medication Interventions: Evaluate medications/consider consulting pharmacy, Patient to call before getting OOB    Elimination Interventions: Call light in reach, Patient to call for help with toileting needs, Toileting schedule/hourly rounds

## 2017-12-10 NOTE — PROGRESS NOTES
Hospitalist Progress Note  Vilam Bass MD  Answering service: [phone] from in house phone        Date of Service:  12/10/2017  NAME:  Pauline Barber  :  1995  MRN:  [id number]      Admission Summary:      patient is a 58-year-old   female with past medical history of insulin-dependent diabetes mellitus   type 1, currently on Lantus and NovoLog at home. The patient came   via EMS secondary to high blood glucose, lethargic, and sleepy. Interval history / Subjective:       No vomiting   Feels better today and wants to eat   Though still lethargic and weak      Assessment & Plan:       DKA   She is type 1 diabetic   Started on insulin gtt  Per protocol , continue the same as the gap not closed yet   Needs more iv fluids ,  CT abd is negative for acute path , so likly from viral syndrome   She will be slow to recover   On hypochloremic fluids to prevent further acidosis   Needs preg test     High AGMA , from DKA , will stop bicarb gtt as ph above 7 , and just needs treatment for DKA and young patients actually recover from acidosis without bicarb and use is very controversial   On iv fluids with improving indicis     Hyperkalemia and now hypokalemia :   Added k to iv now and also given po K this am .     pseudohyponatremia   Improved with insulin gtt           Code status: full   DVT prophylaxis:     Care Plan discussed with: Patient/Family  Disposition: Home w/Family and TBD     Hospital Problems  Date Reviewed: 2017          Codes Class Noted POA    * (Principal)D[facility]diabetic [facility]acidoses) [facility] ICD-10-CM: E13.10  ICD-9-CM: 250.10  2017 Unknown                Review of Systems:   A comprehensive review of systems was negative except for that written in the HPI. Vital Signs:    Last 24hrs VS reviewed since prior progress note.  Most recent are:  Visit Vitals    /89 (BP 1 Location: Left arm, BP Patient Position: At rest;Sitting)    Pulse 92    Temp 98 °F (36.7 °C)    Resp 11    Wt 70.2 kg (154 lb 11.2 oz)    SpO2 99%    BMI 24.97 kg/m2         Intake/Output Summary (Last 24 hours) at 12/10/17 1045  Last data filed at 12/10/17 0800   Gross per 24 hour   Intake          3280.95 ml   Output             2350 ml   Net           930.95 ml        Physical Examination:             Constitutional:  No acute distress,looking very dehydrated , and weak . Dry mucous membranes , lethargic    ENT:  Oral mucous moist, oropharynx benign. Neck supple,    Resp:  CTA bilaterally. No wheezing/rhonchi/rales. No accessory muscle use   CV:  Regular rhythm, normal rate, no murmurs, gallops, rubs    GI:  Soft, non distended, non tender. normoactive bowel sounds, no hepatosplenomegaly     Musculoskeletal:  No edema, warm, 2+ pulses throughout    Neurologic:  Moves all extremities. AAOx3, CN II-XII reviewed            Data Review:    Review and/or order of clinical lab test      Labs:     Recent Labs      12/10/17   0347  12/09/17   0015   WBC  6.0  13.3*   HGB  10.4*  11.9   HCT  33.5*  37.2   PLT  283  379     Recent Labs      12/10/17   0347  12/09/17   2238  12/09/17   1700   12/09/17   0015   NA  143  144  140   < >  144   K  3.2*  3.2*  3.6   < >  3.9   CL  111*  111*  111*   < >  117*   CO2  17*  19*  12*   < >  9*   BUN  2*  2*  4*   < >  7   CREA  0.91  0.76  0.86   < >  0.66   GLU  173*  122*  178*   < >  154*   CA  8.2*  7.9*  8.0*   < >  7.7*   MG  1.8  1.8  1.9   < >  1.9   PHOS  2.3*   --    --    --   1.5*    < > = values in this interval not displayed. Recent Labs      12/08/17   1822   SGOT  36   ALT  74   AP  215*   TBILI  0.4   TP  8.0   ALB  3.2*   GLOB  4.8*     No results for input(s): INR, PTP, APTT in the last 72 hours. No lab exists for component: INREXT, INREXT   No results for input(s): FE, TIBC, PSAT, FERR in the last 72 hours.    No results found for: FOL, RBCF   No results for input(s): PH, PCO2, PO2 in the last 72 hours.   Recent Labs      12/09/17   0526  12/09/17   0015   TROIQ  <0.04  <0.04     No results found for: CHOL, CHOLX, CHLST, CHOLV, HDL, LDL, LDLC, DLDLP, TGLX, TRIGL, TRIGP, CHHD, CHHDX  Lab Results   Component Value Date/Time    Glucose (POC) 178 12/10/2017 09:15 AM    Glucose (POC) 160 12/10/2017 08:20 AM    Glucose (POC) 167 12/10/2017 07:21 AM    Glucose (POC) 242 12/10/2017 06:15 AM    Glucose (POC) 332 12/10/2017 05:12 AM     Lab Results   Component Value Date/Time    Color YELLOW/STRAW 12/08/2017 06:22 PM    Appearance CLEAR 12/08/2017 06:22 PM    Specific gravity 1.022 12/08/2017 06:22 PM    Specific gravity 1.010 11/28/2017 11:55 AM    pH (UA) 5.0 12/08/2017 06:22 PM    Protein TRACE 12/08/2017 06:22 PM    Glucose >1000 12/08/2017 06:22 PM    Ketone >80 12/08/2017 06:22 PM    Bilirubin NEGATIVE  12/08/2017 06:22 PM    Urobilinogen 0.2 12/08/2017 06:22 PM    Nitrites NEGATIVE  12/08/2017 06:22 PM    Leukocyte Esterase NEGATIVE  12/08/2017 06:22 PM    Epithelial cells FEW 12/08/2017 06:22 PM    Bacteria NEGATIVE  12/08/2017 06:22 PM    WBC 0-4 12/08/2017 06:22 PM    RBC 0-5 12/08/2017 06:22 PM         Medications Reviewed:     Current Facility-Administered Medications   Medication Dose Route Frequency    potassium chloride SR (KLOR-CON 10) tablet 40 mEq  40 mEq Oral NOW    potassium, sodium phosphates (NEUTRA-PHOS) packet 1 Packet  1 Packet Oral QID    sodium chloride (NS) flush 10 mL  10 mL InterCATHeter Q24H    sodium chloride (NS) flush 10 mL  10 mL InterCATHeter PRN    sodium chloride (NS) flush 10-40 mL  10-40 mL InterCATHeter Q8H    alteplase (CATHFLO) 1 mg in sterile water (preservative free) 1 mL injection  1 mg InterCATHeter PRN    bacitracin 500 unit/gram packet 1 Packet  1 Packet Topical PRN    acetaminophen (TYLENOL) tablet 650 mg  650 mg Oral Q4H PRN    dextrose 5% lactated ringers 1,000 mL with potassium chloride 20 mEq infusion   IntraVENous CONTINUOUS    lactated Ringers 1,000 mL with potassium chloride 20 mEq infusion   IntraVENous CONTINUOUS    sodium chloride (NS) flush 5-10 mL  5-10 mL IntraVENous Q8H    sodium chloride (NS) flush 5-10 mL  5-10 mL IntraVENous PRN    insulin regular (NOVOLIN R, HUMULIN R) 100 Units in 0.9% sodium chloride 100 mL infusion  0-50 Units/hr IntraVENous TITRATE    glucose chewable tablet 16 g  4 Tab Oral PRN    dextrose (D50W) injection syrg 12.5-25 g  12.5-25 g IntraVENous PRN    glucagon (GLUCAGEN) injection 1 mg  1 mg IntraMUSCular PRN    ondansetron (ZOFRAN) injection 4 mg  4 mg IntraVENous Q4H PRN    insulin lispro (HUMALOG) injection   SubCUTAneous TIDAC     ______________________________________________________________________  EXPECTED LENGTH OF STAY: - - -  ACTUAL LENGTH OF STAY:          2                 Garrett Singh MD

## 2017-12-10 NOTE — PROGRESS NOTES
Central Line Note  Indication: Inadequate venous access    Risks, benefits, alternatives explained and patient agrees to proceed. Patient positioned in Trendelenburg. 7-Step Sterility Protocol followed. (cap, mask sterile gown, sterile gloves, large sterile sheet, hand hygiene, 2% chlorhexidine for cutaneous antisepsis)  5 mL 1% Lidocaine placed at insertion site. Live Ultrasound scan performed. Right internal jugular cannulated x 1 attempt(s) utilizing the Seldinger technique. Venous cannulation confirmed with column drop test prior to dilation. Catheter secured & Biopatch applied. Sterile Tegaderm placed. 8.5 fr quad lumen  CXR pending.

## 2017-12-11 VITALS
BODY MASS INDEX: 24.52 KG/M2 | HEART RATE: 101 BPM | HEIGHT: 66 IN | DIASTOLIC BLOOD PRESSURE: 103 MMHG | SYSTOLIC BLOOD PRESSURE: 145 MMHG | TEMPERATURE: 98.6 F | RESPIRATION RATE: 18 BRPM | WEIGHT: 152.56 LBS | OXYGEN SATURATION: 99 %

## 2017-12-11 LAB
ADMINISTERED INITIALS, ADMINIT: NORMAL
ANION GAP SERPL CALC-SCNC: 11 MMOL/L (ref 5–15)
ANION GAP SERPL CALC-SCNC: 11 MMOL/L (ref 5–15)
BUN SERPL-MCNC: 3 MG/DL (ref 6–20)
BUN SERPL-MCNC: 4 MG/DL (ref 6–20)
BUN/CREAT SERPL: 4 (ref 12–20)
BUN/CREAT SERPL: 7 (ref 12–20)
CALCIUM SERPL-MCNC: 8.4 MG/DL (ref 8.5–10.1)
CALCIUM SERPL-MCNC: 8.4 MG/DL (ref 8.5–10.1)
CHLORIDE SERPL-SCNC: 106 MMOL/L (ref 97–108)
CHLORIDE SERPL-SCNC: 109 MMOL/L (ref 97–108)
CO2 SERPL-SCNC: 23 MMOL/L (ref 21–32)
CO2 SERPL-SCNC: 23 MMOL/L (ref 21–32)
CREAT SERPL-MCNC: 0.56 MG/DL (ref 0.55–1.02)
CREAT SERPL-MCNC: 0.79 MG/DL (ref 0.55–1.02)
D50 ADMINISTERED, D50ADM: 0 ML
D50 ORDER, D50ORD: 0 ML
GLSCOM COMMENTS: NORMAL
GLUCOSE BLD STRIP.AUTO-MCNC: 124 MG/DL (ref 65–100)
GLUCOSE BLD STRIP.AUTO-MCNC: 140 MG/DL (ref 65–100)
GLUCOSE BLD STRIP.AUTO-MCNC: 144 MG/DL (ref 65–100)
GLUCOSE BLD STRIP.AUTO-MCNC: 149 MG/DL (ref 65–100)
GLUCOSE BLD STRIP.AUTO-MCNC: 155 MG/DL (ref 65–100)
GLUCOSE BLD STRIP.AUTO-MCNC: 180 MG/DL (ref 65–100)
GLUCOSE BLD STRIP.AUTO-MCNC: 183 MG/DL (ref 65–100)
GLUCOSE BLD STRIP.AUTO-MCNC: 184 MG/DL (ref 65–100)
GLUCOSE BLD STRIP.AUTO-MCNC: 200 MG/DL (ref 65–100)
GLUCOSE BLD STRIP.AUTO-MCNC: 228 MG/DL (ref 65–100)
GLUCOSE BLD STRIP.AUTO-MCNC: 264 MG/DL (ref 65–100)
GLUCOSE BLD STRIP.AUTO-MCNC: 318 MG/DL (ref 65–100)
GLUCOSE SERPL-MCNC: 155 MG/DL (ref 65–100)
GLUCOSE SERPL-MCNC: 316 MG/DL (ref 65–100)
GLUCOSE, GLC: 124 MG/DL
GLUCOSE, GLC: 140 MG/DL
GLUCOSE, GLC: 144 MG/DL
GLUCOSE, GLC: 149 MG/DL
GLUCOSE, GLC: 155 MG/DL
GLUCOSE, GLC: 180 MG/DL
GLUCOSE, GLC: 183 MG/DL
GLUCOSE, GLC: 184 MG/DL
GLUCOSE, GLC: 200 MG/DL
GLUCOSE, GLC: 228 MG/DL
GLUCOSE, GLC: 264 MG/DL
GLUCOSE, GLC: 318 MG/DL
HIGH TARGET, HITG: 250 MG/DL
INSULIN ADMINSTERED, INSADM: 0 UNITS/HOUR
INSULIN ADMINSTERED, INSADM: 0.1 UNITS/HOUR
INSULIN ADMINSTERED, INSADM: 0.8 UNITS/HOUR
INSULIN ADMINSTERED, INSADM: 1.2 UNITS/HOUR
INSULIN ADMINSTERED, INSADM: 1.4 UNITS/HOUR
INSULIN ADMINSTERED, INSADM: 1.7 UNITS/HOUR
INSULIN ADMINSTERED, INSADM: 1.9 UNITS/HOUR
INSULIN ADMINSTERED, INSADM: 2.5 UNITS/HOUR
INSULIN ADMINSTERED, INSADM: 2.6 UNITS/HOUR
INSULIN ADMINSTERED, INSADM: 4.1 UNITS/HOUR
INSULIN ORDER, INSORD: 0 UNITS/HOUR
INSULIN ORDER, INSORD: 0.1 UNITS/HOUR
INSULIN ORDER, INSORD: 0.8 UNITS/HOUR
INSULIN ORDER, INSORD: 1.2 UNITS/HOUR
INSULIN ORDER, INSORD: 1.4 UNITS/HOUR
INSULIN ORDER, INSORD: 1.7 UNITS/HOUR
INSULIN ORDER, INSORD: 1.9 UNITS/HOUR
INSULIN ORDER, INSORD: 2.5 UNITS/HOUR
INSULIN ORDER, INSORD: 2.6 UNITS/HOUR
INSULIN ORDER, INSORD: 4.1 UNITS/HOUR
LOW TARGET, LOT: 150 MG/DL
MAGNESIUM SERPL-MCNC: 1.4 MG/DL (ref 1.6–2.4)
MAGNESIUM SERPL-MCNC: 1.5 MG/DL (ref 1.6–2.4)
MINUTES UNTIL NEXT BG, NBG: 60 MIN
MULTIPLIER, MUL: 0
MULTIPLIER, MUL: 0.01
MULTIPLIER, MUL: 0.02
ORDER INITIALS, ORDINIT: NORMAL
POTASSIUM SERPL-SCNC: 3.7 MMOL/L (ref 3.5–5.1)
POTASSIUM SERPL-SCNC: 3.8 MMOL/L (ref 3.5–5.1)
SERVICE CMNT-IMP: ABNORMAL
SODIUM SERPL-SCNC: 140 MMOL/L (ref 136–145)
SODIUM SERPL-SCNC: 143 MMOL/L (ref 136–145)

## 2017-12-11 PROCEDURE — 74011258636 HC RX REV CODE- 258/636: Performed by: INTERNAL MEDICINE

## 2017-12-11 PROCEDURE — 74011636637 HC RX REV CODE- 636/637: Performed by: INTERNAL MEDICINE

## 2017-12-11 PROCEDURE — 74011636637 HC RX REV CODE- 636/637: Performed by: FAMILY MEDICINE

## 2017-12-11 PROCEDURE — 83735 ASSAY OF MAGNESIUM: CPT | Performed by: FAMILY MEDICINE

## 2017-12-11 PROCEDURE — 74011250636 HC RX REV CODE- 250/636: Performed by: INTERNAL MEDICINE

## 2017-12-11 PROCEDURE — 82962 GLUCOSE BLOOD TEST: CPT

## 2017-12-11 PROCEDURE — 36592 COLLECT BLOOD FROM PICC: CPT

## 2017-12-11 PROCEDURE — 80048 BASIC METABOLIC PNL TOTAL CA: CPT | Performed by: FAMILY MEDICINE

## 2017-12-11 PROCEDURE — 74011250637 HC RX REV CODE- 250/637: Performed by: INTERNAL MEDICINE

## 2017-12-11 PROCEDURE — 36415 COLL VENOUS BLD VENIPUNCTURE: CPT | Performed by: FAMILY MEDICINE

## 2017-12-11 RX ORDER — INSULIN LISPRO 100 [IU]/ML
INJECTION, SOLUTION INTRAVENOUS; SUBCUTANEOUS
Status: DISCONTINUED | OUTPATIENT
Start: 2017-12-11 | End: 2017-12-11 | Stop reason: HOSPADM

## 2017-12-11 RX ORDER — INSULIN GLARGINE 100 [IU]/ML
45 INJECTION, SOLUTION SUBCUTANEOUS DAILY
Status: DISCONTINUED | OUTPATIENT
Start: 2017-12-11 | End: 2017-12-11 | Stop reason: HOSPADM

## 2017-12-11 RX ORDER — MAGNESIUM SULFATE 100 %
4 CRYSTALS MISCELLANEOUS AS NEEDED
Status: DISCONTINUED | OUTPATIENT
Start: 2017-12-11 | End: 2017-12-11 | Stop reason: SDUPTHER

## 2017-12-11 RX ORDER — DEXTROSE 50 % IN WATER (D50W) INTRAVENOUS SYRINGE
12.5-25 AS NEEDED
Status: DISCONTINUED | OUTPATIENT
Start: 2017-12-11 | End: 2017-12-11 | Stop reason: SDUPTHER

## 2017-12-11 RX ADMIN — INSULIN LISPRO 1 UNITS: 100 INJECTION, SOLUTION INTRAVENOUS; SUBCUTANEOUS at 07:30

## 2017-12-11 RX ADMIN — Medication 10 ML: at 06:56

## 2017-12-11 RX ADMIN — INSULIN GLARGINE 45 UNITS: 100 INJECTION, SOLUTION SUBCUTANEOUS at 09:45

## 2017-12-11 RX ADMIN — ACETAMINOPHEN 650 MG: 325 TABLET, FILM COATED ORAL at 02:11

## 2017-12-11 RX ADMIN — POTASSIUM CHLORIDE: 149 INJECTION, SOLUTION, CONCENTRATE INTRAVENOUS at 02:11

## 2017-12-11 RX ADMIN — POTASSIUM & SODIUM PHOSPHATES POWDER PACK 280-160-250 MG 1 PACKET: 280-160-250 PACK at 08:32

## 2017-12-11 NOTE — DISCHARGE INSTRUCTIONS
Discharge Instructions       PATIENT ID: Gretta Sheth  MRN: [id number]   YOB: 1995    DATE OF ADMISSION: 12/8/2017  6:08 PM    DATE OF DISCHARGE: 12/11/2017    PRIMARY CARE PROVIDER: None     ATTENDING PHYSICIAN: Gina King MD  DISCHARGING PROVIDER: Gina King MD    To contact this individual call [phone] and ask the  to page. If unavailable ask to be transferred the Adult Hospitalist Department. DISCHARGE DIAGNOSES   Diabetic ketoacidosis    CONSULTATIONS: IP CONSULT TO HOSPITALIST  IP CONSULT TO NEPHROLOGY    PROCEDURES/SURGERIES: * No surgery found *    PENDING TEST RESULTS:   At the time of discharge the following test results are still pending:     FOLLOW UP APPOINTMENTS:   Follow-up Information     Follow up With Details Comments Contact Info    None   None (395) Patient stated that they have no PCP             ADDITIONAL CARE RECOMMENDATIONS:   Please take lantus and follow up with pcp   And advise if you can find endocrinologist according to your insurance     DIET: Regular Diet  Oral Nutritional Supplements:     ACTIVITY: Activity as tolerated    WOUND CARE:     EQUIPMENT needed:       DISCHARGE MEDICATIONS:   See Medication Reconciliation Form    · It is important that you take the medication exactly as they are prescribed. · Keep your medication in the bottles provided by the pharmacist and keep a list of the medication names, dosages, and times to be taken in your wallet. · Do not take other medications without consulting your doctor. NOTIFY YOUR PHYSICIAN FOR ANY OF THE FOLLOWING:   Fever over 101 degrees for 24 hours. Chest pain, shortness of breath, fever, chills, nausea, vomiting, diarrhea, change in mentation, falling, weakness, bleeding. Severe pain or pain not relieved by medications. Or, any other signs or symptoms that you may have questions about.       DISPOSITION:  xx  Home With:   OT  PT  [facility]  RN       SNF/Inpatient Rehab/LTAC Independent/assisted living    Hospice    Other:     CDMP Checked:   Yes x     PROBLEM LIST Updated:  Yes x       Signed:   Fritz Mayberry MD  12/11/2017  11:37 AM

## 2017-12-11 NOTE — PROGRESS NOTES
0730: Bedside and Verbal shift change report given to Yoan Mauricio RN (oncoming nurse) by Arturo Mcgovern RN (offgoing nurse). Report included the following information SBAR, Kardex, Intake/Output, MAR, Recent Results and Cardiac Rhythm Sinus Tach. 0800:  Assessment complete. No new issues. Patient on insulin drip, base fluids running. Patient denies pain or nausea. Spoke to Dr. Lucas Bryant regarding Q4 labs. She will be up to see patient shortly. Do not draw labs at this time. 0945:  Lantus given per order. 1130:  Discharge order noted. 1200:  Dr. Lucas Bryant updated with patient's last few blood sugar readings. Patient to be discharged. Patient has an appointment set up with Dr. Davin Singh (PCP) for Thursday and an endocrinologist also this week that she can not remember the name of. Dr. Lucas Bryant notified. 1300:  Spoke with DTC team to have them see patient before discharge. They will be up to see patient. 26:  Spoke with DTC team again to see patient prior to discharge. They will be up to see patient. 1430:  DTC team at bedside. 1530: I have reviewed discharge instructions with the parent. The patient verbalized understanding. Patient leaving no prescriptions. Patient has a follow up appointment at  on 12/14 with PCP. Patient leaving in private car to private residence with mother.

## 2017-12-11 NOTE — PROGRESS NOTES
[address].      12/11/2017      RE: Angélica Harris      To Whom it May Concern: This is to certify that Angélica Harris was admitted to the hospital from 12/08/17 to 12/11/17 . Cami Pruitt Please feel free to contact my office if you have any questions or concerns. Thank you for your assistance in this matter.     Sincerely,      Marlene Quevedo MD  Adult Hospitalist   [address] , [facility] [id number]  Office    /[facility]

## 2017-12-11 NOTE — DIABETES MGMT
DTC Consult Note    Recommendations/ Comments: Met with patient and her mother. She reports being diagnosed with Type 1 diabetes at age 9 years. She states her blood sugars are often in the mid 250's. She is changing all of her doctor's and has an appointment with both a new PCP and new endocrinologist in the the next 2 weeks. She reports that she knows how to carb count, but has been eating out a lot and not doing as well. Discussed outpatient classes and provided a referral for classes, but can not schedule this appointment yet due to her being a new patient with MD's. Also, discussed need to test for ketones when blood sugars are running high. Patient appears motivated to improve control by seeing new MD's and following up with DTC (most likely at [facility]). Consult received for:  [x]             Assessment of home management                [x]      Medication Recommendations    Chart reviewed and initial evaluation complete on Dianna Fairbanks. Patient is a 25 y.o. female with a history of Type 1 Diabetes on intensive insulin injection program (Lantus 42 units and Humalog with meals 1:8 insulin to carb ratio) at home. BG monitoring at home 3-4 times per day. Patient reports BG levels at home trend: increasing steadily. Assessed and instructed patient on the following:   ·  interpretation of lab results, blood sugar goals, complications of diabetes mellitus, hypoglycemia prevention and treatment, insulin adjustments, illness management and nutrition    Provided patient with the following: [x]             Survival skills education materials                 [x]             Outpatient DTC contact number    Discussed with patient and/or family need for follow up appointment for diabetes management after discharge.       A1c:   Lab Results   Component Value Date/Time    Hemoglobin A1c 11.5 12/09/2017 12:15 AM       Recent Glucose Results:   Lab Results   Component Value Date/Time     (H) 12/11/2017 03:50 AM     (H) 12/10/2017 11:27 PM     (H) 12/10/2017 06:35 PM    GLUCPOC 180 (H) 12/11/2017 11:55 AM    GLUCPOC 124 (H) 12/11/2017 10:56 AM    GLUCPOC 144 (H) 12/11/2017 09:57 AM        Lab Results   Component Value Date/Time    Creatinine 0.56 12/11/2017 03:50 AM     Estimated Creatinine Clearance: 148.8 mL/min (based on Cr of 0.56). Active Orders   Diet    DIET DIABETIC CONSISTENT CARB Regular        PO intake: No data found. Current hospital DM medication: lantus 45 units, Lispro for correction, normal sensitivity    Will continue to follow as needed. Thank you.   Ally Nicholson, MS, RN, CDE

## 2017-12-20 NOTE — DISCHARGE SUMMARY
Discharge Summary       PATIENT ID: Hortencia Wiggins  MRN: [id number]   YOB: 1995    DATE OF ADMISSION: 12/8/2017  6:08 PM    DATE OF DISCHARGE: 12/11/17    PRIMARY CARE PROVIDER: None     ATTENDING PHYSICIAN: Amrit May   DISCHARGING PROVIDER: Francesca Messina MD    To contact this individual call [phone] and ask the  to page. If unavailable ask to be transferred the Adult Hospitalist Department. CONSULTATIONS: IP CONSULT TO NEPHROLOGY    PROCEDURES/SURGERIES: * No surgery found *    ADMITTING DIAGNOSES & HOSPITAL COURSE:    71-year-old   female with past medical history of insulin-dependent diabetes mellitus   type 1, currently on Lantus and NovoLog at home. The patient came   via EMS secondary to high blood glucose, lethargic, and sleepy. History was obtained from the patient as well as patient's mother. The   patient reports that her blood glucose level was more than 450 today   per her mother. The patient came to the hospital. She was having   some nausea associated with generalized weakness, abdominal pain,   slight shortness of breath, and just \"not feeling right. \" Mother reports   that for the past 1 week, the patient has been having episodes of   nausea and vomiting, but no diarrhea. The patient reports that she has   been taking her insulin on a regular basis, but \"can't get a handle on   her sugars. \" The patient reports that she was seen at [facility] ED about   last week for nausea and vomiting, but was discharged without any   significant intervention. The patient denied any other complaints or   problems. The patient was found to have a pH of 6.8. The patient was   found to be in DKA in the ER and was found to have a pH of 6.827   on ABG. The patient was requested to be admitted under the   hospitalist service. The patient denies any other complaints or   problems. Denies any headache, blurry vision, sore throat, trouble   swallowing, trouble with speech, any chest pain.  Does admit to some   shortness of breath. Denies any cough, fever, chills, urinary symptoms,   vaginal bleeding, vaginal discharge, any focal or generalized   neurological weakness, recent travel, sick contacts, falls, injuries,   hematemesis, melena, hemoptysis or any other concerns or problems.     PAST MEDICAL HISTORY: See above.     HOME MEDICATIONS: CURRENTLY THE PATIENT IS ON   1. NovoLog sliding scale. 2. Lantus 42 units daily.     SOCIAL HISTORY: Denies tobacco abuse. Occasional alcohol use. Denies IV drug abuse. Lives at home.     FAMILY HISTORY: Mother and hypertension, elevated lipid and   father with history of lupus. No results found for this or any previous visit (from the past 24 hour(s)).        Hospital course     DKA   She is type 1 diabetic   Started on insulin gtt  Per protocol , continue the same as the gap not closed yet   Needs more iv fluids ,  CT abd is negative for acute path , so likly from viral syndrome   She will be slow to recover   On hypochloremic fluids to prevent further acidosis   Improved in 2 days and was off the gtt and back on lantus and discharged home to follow up with pcp .      High AGMA , from DKA , will stop bicarb gtt as ph above 7 , and just needs treatment for DKA and young patients actually recover from acidosis without bicarb and use is very controversial   On iv fluids with improving indicis   And now normal      Hyperkalemia and now hypokalemia :   Added k to iv now and also given po K this am .      pseudohyponatremia   Improved with insulin gtt and now normal on discharge          DISCHARGE DIAGNOSES / PLAN:      Follow up with pcp        PENDING TEST RESULTS:   At the time of discharge the following test results are still pending:     FOLLOW UP APPOINTMENTS:    Follow-up Information     Follow up With Details Comments Contact Info    None   None (395) Patient stated that they have no PCP        please find a pcp             ADDITIONAL CARE RECOMMENDATIONS:   Please follow up pcp     DIET: Regular Diet  Oral Nutritional Supplements:  ACTIVITY: Activity as tolerated    WOUND CARE:     EQUIPMENT needed:       DISCHARGE MEDICATIONS:  Discharge Medication List as of 12/11/2017 12:18 PM      CONTINUE these medications which have NOT CHANGED    Details   insulin aspart (NOVOLOG) 100 unit/mL injection 1 Units by SubCUTAneous route three (3) times daily (with meals). Patient injects 1 unit for every 8 grams of carbohydrates. , Print, Disp-30 Vial, R-1      insulin glargine (LANTUS) 100 unit/mL injection 42 Units by SubCUTAneous route daily. , Print, Disp-30 Vial, R-1               NOTIFY YOUR PHYSICIAN FOR ANY OF THE FOLLOWING:   Fever over 101 degrees for 24 hours. Chest pain, shortness of breath, fever, chills, nausea, vomiting, diarrhea, change in mentation, falling, weakness, bleeding. Severe pain or pain not relieved by medications. Or, any other signs or symptoms that you may have questions about.     DISPOSITION:  x  Home With:   OT  PT  HH  RN       Long term SNF/Inpatient Rehab    Independent/assisted living    Hospice    Other:       PATIENT CONDITION AT DISCHARGE:     Functional status    Poor     Deconditioned    x Independent      Cognition   x  Lucid     Forgetful     Dementia      Catheters/lines (plus indication)    Miranda     PICC     PEG    x None      Code status    x Full code     DNR      PHYSICAL EXAMINATION AT DISCHARGE:   Refer to Progress Note  Visit Vitals    BP (!) 145/103 (BP 1 Location: Left arm, BP Patient Position: At rest)    Pulse (!) 101    Temp 98.6 °F (37 °C)    Resp 18    Ht 5' 5.98\" (1.676 m)    Wt 69.2 kg (152 lb 8.9 oz)    SpO2 99%    BMI 24.64 kg/m2     [id number]  abd soft   Chest is clear   No pedal edema       CHRONIC MEDICAL DIAGNOSES:  Problem List as of 12/11/2017  Date Reviewed: 12/8/2017          Codes Class Noted - Resolved    * (Principal)DKA (diabetic ketoacidoses) ([facility] ICD-10-CM: E13.10  ICD-9-CM: 250.10  11/28/2017 - Present Greater than 20  minutes were spent with the patient on counseling and coordination of care    Signed:   Nina Kowalski MD  12/20/2017  2:56 PM

## 2018-05-04 ENCOUNTER — HOSPITAL ENCOUNTER (INPATIENT)
Age: 23
LOS: 7 days | Discharge: HOME | DRG: 746 | End: 2018-05-11
Attending: EMERGENCY MEDICINE | Admitting: INTERNAL MEDICINE
Payer: COMMERCIAL

## 2018-05-04 ENCOUNTER — APPOINTMENT (OUTPATIENT)
Dept: CT IMAGING | Age: 23
DRG: 746 | End: 2018-05-04
Attending: EMERGENCY MEDICINE
Payer: COMMERCIAL

## 2018-05-04 DIAGNOSIS — L02.31 ABSCESS OF RIGHT BUTTOCK: Primary | ICD-10-CM

## 2018-05-04 DIAGNOSIS — N76.4 LABIAL ABSCESS: ICD-10-CM

## 2018-05-04 DIAGNOSIS — E87.20 LACTIC ACIDOSIS: ICD-10-CM

## 2018-05-04 PROBLEM — A41.9 SEPSIS (HCC): Status: ACTIVE | Noted: 2018-05-04

## 2018-05-04 LAB
ALBUMIN SERPL-MCNC: 2.3 G/DL (ref 3.5–5)
ALBUMIN/GLOB SERPL: 0.6 {RATIO} (ref 1.1–2.2)
ALP SERPL-CCNC: 160 U/L (ref 45–117)
ALT SERPL-CCNC: 28 U/L (ref 12–78)
ANION GAP SERPL CALC-SCNC: 7 MMOL/L (ref 5–15)
APPEARANCE UR: CLEAR
AST SERPL-CCNC: 23 U/L (ref 15–37)
BACTERIA URNS QL MICRO: NEGATIVE /HPF
BASOPHILS # BLD: 0 K/UL (ref 0–0.1)
BASOPHILS NFR BLD: 0 % (ref 0–1)
BILIRUB SERPL-MCNC: 0.2 MG/DL (ref 0.2–1)
BILIRUB UR QL: NEGATIVE
BUN SERPL-MCNC: 18 MG/DL (ref 6–20)
BUN/CREAT SERPL: 28 (ref 12–20)
CALCIUM SERPL-MCNC: 8.2 MG/DL (ref 8.5–10.1)
CHLORIDE SERPL-SCNC: 107 MMOL/L (ref 97–108)
CO2 SERPL-SCNC: 24 MMOL/L (ref 21–32)
COLOR UR: ABNORMAL
CREAT SERPL-MCNC: 0.64 MG/DL (ref 0.55–1.02)
DIFFERENTIAL METHOD BLD: ABNORMAL
EOSINOPHIL # BLD: 0.1 K/UL (ref 0–0.4)
EOSINOPHIL NFR BLD: 1 % (ref 0–7)
EPITH CASTS URNS QL MICRO: ABNORMAL /LPF
ERYTHROCYTE [DISTWIDTH] IN BLOOD BY AUTOMATED COUNT: 15 % (ref 11.5–14.5)
EST. AVERAGE GLUCOSE BLD GHB EST-MCNC: 289 MG/DL
GLOBULIN SER CALC-MCNC: 3.8 G/DL (ref 2–4)
GLUCOSE BLD STRIP.AUTO-MCNC: 231 MG/DL (ref 65–100)
GLUCOSE BLD STRIP.AUTO-MCNC: 345 MG/DL (ref 65–100)
GLUCOSE SERPL-MCNC: 153 MG/DL (ref 65–100)
GLUCOSE UR STRIP.AUTO-MCNC: >1000 MG/DL
HBA1C MFR BLD: 11.7 % (ref 4.2–6.3)
HCG UR QL: NEGATIVE
HCT VFR BLD AUTO: 35.5 % (ref 35–47)
HGB BLD-MCNC: 11.7 G/DL (ref 11.5–16)
HGB UR QL STRIP: ABNORMAL
HYALINE CASTS URNS QL MICRO: ABNORMAL /LPF (ref 0–5)
IMM GRANULOCYTES # BLD: 0 K/UL (ref 0–0.04)
IMM GRANULOCYTES NFR BLD AUTO: 0 % (ref 0–0.5)
KETONES UR QL STRIP.AUTO: 15 MG/DL
LACTATE BLD-SCNC: 1.1 MMOL/L (ref 0.4–2)
LACTATE SERPL-SCNC: 2.9 MMOL/L (ref 0.4–2)
LEUKOCYTE ESTERASE UR QL STRIP.AUTO: NEGATIVE
LYMPHOCYTES # BLD: 2.2 K/UL (ref 0.8–3.5)
LYMPHOCYTES NFR BLD: 23 % (ref 12–49)
MAGNESIUM SERPL-MCNC: 1.9 MG/DL (ref 1.6–2.4)
MCH RBC QN AUTO: 28.6 PG (ref 26–34)
MCHC RBC AUTO-ENTMCNC: 33 G/DL (ref 30–36.5)
MCV RBC AUTO: 86.8 FL (ref 80–99)
MONOCYTES # BLD: 0.5 K/UL (ref 0–1)
MONOCYTES NFR BLD: 6 % (ref 5–13)
NEUTS SEG # BLD: 6.6 K/UL (ref 1.8–8)
NEUTS SEG NFR BLD: 69 % (ref 32–75)
NITRITE UR QL STRIP.AUTO: NEGATIVE
NRBC # BLD: 0 K/UL (ref 0–0.01)
NRBC BLD-RTO: 0 PER 100 WBC
PH UR STRIP: 5.5 [PH] (ref 5–8)
PLATELET # BLD AUTO: 318 K/UL (ref 150–400)
PMV BLD AUTO: 11.9 FL (ref 8.9–12.9)
POTASSIUM SERPL-SCNC: 4.9 MMOL/L (ref 3.5–5.1)
PROT SERPL-MCNC: 6.1 G/DL (ref 6.4–8.2)
PROT UR STRIP-MCNC: NEGATIVE MG/DL
RBC # BLD AUTO: 4.09 M/UL (ref 3.8–5.2)
RBC #/AREA URNS HPF: ABNORMAL /HPF (ref 0–5)
SERVICE CMNT-IMP: ABNORMAL
SERVICE CMNT-IMP: ABNORMAL
SODIUM SERPL-SCNC: 138 MMOL/L (ref 136–145)
SP GR UR REFRACTOMETRY: 1.01 (ref 1–1.03)
UROBILINOGEN UR QL STRIP.AUTO: 0.2 EU/DL (ref 0.2–1)
WBC # BLD AUTO: 9.5 K/UL (ref 3.6–11)
WBC URNS QL MICRO: ABNORMAL /HPF (ref 0–4)

## 2018-05-04 PROCEDURE — 96375 TX/PRO/DX INJ NEW DRUG ADDON: CPT

## 2018-05-04 PROCEDURE — 74011250636 HC RX REV CODE- 250/636: Performed by: EMERGENCY MEDICINE

## 2018-05-04 PROCEDURE — 83036 HEMOGLOBIN GLYCOSYLATED A1C: CPT | Performed by: INTERNAL MEDICINE

## 2018-05-04 PROCEDURE — 87077 CULTURE AEROBIC IDENTIFY: CPT | Performed by: INTERNAL MEDICINE

## 2018-05-04 PROCEDURE — 0H98XZZ DRAINAGE OF BUTTOCK SKIN, EXTERNAL APPROACH: ICD-10-PCS | Performed by: EMERGENCY MEDICINE

## 2018-05-04 PROCEDURE — 99284 EMERGENCY DEPT VISIT MOD MDM: CPT

## 2018-05-04 PROCEDURE — 74177 CT ABD & PELVIS W/CONTRAST: CPT

## 2018-05-04 PROCEDURE — 74011000258 HC RX REV CODE- 258: Performed by: INTERNAL MEDICINE

## 2018-05-04 PROCEDURE — 83605 ASSAY OF LACTIC ACID: CPT | Performed by: EMERGENCY MEDICINE

## 2018-05-04 PROCEDURE — 74011250636 HC RX REV CODE- 250/636: Performed by: INTERNAL MEDICINE

## 2018-05-04 PROCEDURE — 96361 HYDRATE IV INFUSION ADD-ON: CPT

## 2018-05-04 PROCEDURE — 74011636320 HC RX REV CODE- 636/320: Performed by: EMERGENCY MEDICINE

## 2018-05-04 PROCEDURE — 74011250636 HC RX REV CODE- 250/636: Performed by: PHYSICIAN ASSISTANT

## 2018-05-04 PROCEDURE — 74011000250 HC RX REV CODE- 250: Performed by: PHYSICIAN ASSISTANT

## 2018-05-04 PROCEDURE — 36415 COLL VENOUS BLD VENIPUNCTURE: CPT | Performed by: EMERGENCY MEDICINE

## 2018-05-04 PROCEDURE — 81025 URINE PREGNANCY TEST: CPT

## 2018-05-04 PROCEDURE — 75810000116 HC INC/DRN PILONIDAL CYST SIMPLE

## 2018-05-04 PROCEDURE — 96376 TX/PRO/DX INJ SAME DRUG ADON: CPT

## 2018-05-04 PROCEDURE — 80053 COMPREHEN METABOLIC PANEL: CPT | Performed by: EMERGENCY MEDICINE

## 2018-05-04 PROCEDURE — 94761 N-INVAS EAR/PLS OXIMETRY MLT: CPT

## 2018-05-04 PROCEDURE — 87186 SC STD MICRODIL/AGAR DIL: CPT | Performed by: INTERNAL MEDICINE

## 2018-05-04 PROCEDURE — 83735 ASSAY OF MAGNESIUM: CPT | Performed by: EMERGENCY MEDICINE

## 2018-05-04 PROCEDURE — 83605 ASSAY OF LACTIC ACID: CPT

## 2018-05-04 PROCEDURE — 65270000015 HC RM PRIVATE ONCOLOGY

## 2018-05-04 PROCEDURE — 87040 BLOOD CULTURE FOR BACTERIA: CPT | Performed by: EMERGENCY MEDICINE

## 2018-05-04 PROCEDURE — 85025 COMPLETE CBC W/AUTO DIFF WBC: CPT | Performed by: PHYSICIAN ASSISTANT

## 2018-05-04 PROCEDURE — 74011250637 HC RX REV CODE- 250/637: Performed by: INTERNAL MEDICINE

## 2018-05-04 PROCEDURE — 96374 THER/PROPH/DIAG INJ IV PUSH: CPT

## 2018-05-04 PROCEDURE — 82962 GLUCOSE BLOOD TEST: CPT

## 2018-05-04 PROCEDURE — 81001 URINALYSIS AUTO W/SCOPE: CPT | Performed by: EMERGENCY MEDICINE

## 2018-05-04 PROCEDURE — 74011636637 HC RX REV CODE- 636/637: Performed by: INTERNAL MEDICINE

## 2018-05-04 PROCEDURE — 87205 SMEAR GRAM STAIN: CPT | Performed by: INTERNAL MEDICINE

## 2018-05-04 PROCEDURE — 87185 SC STD ENZYME DETCJ PER NZM: CPT | Performed by: INTERNAL MEDICINE

## 2018-05-04 RX ORDER — INSULIN GLARGINE 100 [IU]/ML
42 INJECTION, SOLUTION SUBCUTANEOUS
Status: DISCONTINUED | OUTPATIENT
Start: 2018-05-04 | End: 2018-05-05

## 2018-05-04 RX ORDER — SODIUM CHLORIDE 0.9 % (FLUSH) 0.9 %
5-10 SYRINGE (ML) INJECTION AS NEEDED
Status: DISCONTINUED | OUTPATIENT
Start: 2018-05-04 | End: 2018-05-11 | Stop reason: HOSPADM

## 2018-05-04 RX ORDER — NALOXONE HYDROCHLORIDE 0.4 MG/ML
0.4 INJECTION, SOLUTION INTRAMUSCULAR; INTRAVENOUS; SUBCUTANEOUS AS NEEDED
Status: DISCONTINUED | OUTPATIENT
Start: 2018-05-04 | End: 2018-05-11 | Stop reason: HOSPADM

## 2018-05-04 RX ORDER — ONDANSETRON 2 MG/ML
4 INJECTION INTRAMUSCULAR; INTRAVENOUS
Status: DISCONTINUED | OUTPATIENT
Start: 2018-05-04 | End: 2018-05-11 | Stop reason: HOSPADM

## 2018-05-04 RX ORDER — SODIUM CHLORIDE 9 MG/ML
75 INJECTION, SOLUTION INTRAVENOUS CONTINUOUS
Status: DISCONTINUED | OUTPATIENT
Start: 2018-05-04 | End: 2018-05-08

## 2018-05-04 RX ORDER — SODIUM CHLORIDE 0.9 % (FLUSH) 0.9 %
5-10 SYRINGE (ML) INJECTION EVERY 8 HOURS
Status: DISCONTINUED | OUTPATIENT
Start: 2018-05-04 | End: 2018-05-11 | Stop reason: HOSPADM

## 2018-05-04 RX ORDER — MORPHINE SULFATE 4 MG/ML
4 INJECTION INTRAVENOUS
Status: COMPLETED | OUTPATIENT
Start: 2018-05-04 | End: 2018-05-04

## 2018-05-04 RX ORDER — DEXTROSE 50 % IN WATER (D50W) INTRAVENOUS SYRINGE
12.5-25 AS NEEDED
Status: DISCONTINUED | OUTPATIENT
Start: 2018-05-04 | End: 2018-05-11 | Stop reason: HOSPADM

## 2018-05-04 RX ORDER — INSULIN GLARGINE 100 [IU]/ML
40 INJECTION, SOLUTION SUBCUTANEOUS
Status: DISCONTINUED | OUTPATIENT
Start: 2018-05-04 | End: 2018-05-04

## 2018-05-04 RX ORDER — ONDANSETRON 2 MG/ML
4 INJECTION INTRAMUSCULAR; INTRAVENOUS
Status: COMPLETED | OUTPATIENT
Start: 2018-05-04 | End: 2018-05-04

## 2018-05-04 RX ORDER — INSULIN LISPRO 100 [IU]/ML
INJECTION, SOLUTION INTRAVENOUS; SUBCUTANEOUS
Status: DISCONTINUED | OUTPATIENT
Start: 2018-05-05 | End: 2018-05-11 | Stop reason: HOSPADM

## 2018-05-04 RX ORDER — MORPHINE SULFATE 10 MG/ML
4 INJECTION, SOLUTION INTRAMUSCULAR; INTRAVENOUS
Status: COMPLETED | OUTPATIENT
Start: 2018-05-04 | End: 2018-05-04

## 2018-05-04 RX ORDER — OXYCODONE AND ACETAMINOPHEN 5; 325 MG/1; MG/1
1 TABLET ORAL
Status: DISCONTINUED | OUTPATIENT
Start: 2018-05-04 | End: 2018-05-05

## 2018-05-04 RX ORDER — INSULIN LISPRO 100 [IU]/ML
INJECTION, SOLUTION INTRAVENOUS; SUBCUTANEOUS
Status: DISCONTINUED | OUTPATIENT
Start: 2018-05-04 | End: 2018-05-05

## 2018-05-04 RX ORDER — ACETAMINOPHEN 325 MG/1
650 TABLET ORAL
Status: DISCONTINUED | OUTPATIENT
Start: 2018-05-04 | End: 2018-05-06

## 2018-05-04 RX ORDER — MAGNESIUM SULFATE 100 %
4 CRYSTALS MISCELLANEOUS AS NEEDED
Status: DISCONTINUED | OUTPATIENT
Start: 2018-05-04 | End: 2018-05-11 | Stop reason: HOSPADM

## 2018-05-04 RX ORDER — FACIAL-BODY WIPES
10 EACH TOPICAL DAILY PRN
Status: DISCONTINUED | OUTPATIENT
Start: 2018-05-04 | End: 2018-05-11 | Stop reason: HOSPADM

## 2018-05-04 RX ORDER — SODIUM CHLORIDE 9 MG/ML
50 INJECTION, SOLUTION INTRAVENOUS
Status: COMPLETED | OUTPATIENT
Start: 2018-05-04 | End: 2018-05-04

## 2018-05-04 RX ORDER — SODIUM CHLORIDE 0.9 % (FLUSH) 0.9 %
10 SYRINGE (ML) INJECTION
Status: COMPLETED | OUTPATIENT
Start: 2018-05-04 | End: 2018-05-04

## 2018-05-04 RX ORDER — LORAZEPAM 2 MG/ML
1 INJECTION INTRAMUSCULAR
Status: COMPLETED | OUTPATIENT
Start: 2018-05-04 | End: 2018-05-04

## 2018-05-04 RX ORDER — LIDOCAINE HYDROCHLORIDE AND EPINEPHRINE 20; 5 MG/ML; UG/ML
10 INJECTION, SOLUTION EPIDURAL; INFILTRATION; INTRACAUDAL; PERINEURAL ONCE
Status: DISCONTINUED | OUTPATIENT
Start: 2018-05-04 | End: 2018-05-04

## 2018-05-04 RX ADMIN — INSULIN GLARGINE 42 UNITS: 100 INJECTION, SOLUTION SUBCUTANEOUS at 21:55

## 2018-05-04 RX ADMIN — Medication 5 ML: at 15:37

## 2018-05-04 RX ADMIN — OXYCODONE HYDROCHLORIDE AND ACETAMINOPHEN 1 TABLET: 5; 325 TABLET ORAL at 17:16

## 2018-05-04 RX ADMIN — SODIUM CHLORIDE 1000 ML: 900 INJECTION, SOLUTION INTRAVENOUS at 12:07

## 2018-05-04 RX ADMIN — INSULIN LISPRO 4 UNITS: 100 INJECTION, SOLUTION INTRAVENOUS; SUBCUTANEOUS at 18:40

## 2018-05-04 RX ADMIN — INSULIN LISPRO 1 UNITS: 100 INJECTION, SOLUTION INTRAVENOUS; SUBCUTANEOUS at 21:54

## 2018-05-04 RX ADMIN — MORPHINE SULFATE 4 MG: 4 INJECTION INTRAVENOUS at 15:48

## 2018-05-04 RX ADMIN — Medication 10 ML: at 13:44

## 2018-05-04 RX ADMIN — ONDANSETRON 4 MG: 2 INJECTION INTRAMUSCULAR; INTRAVENOUS at 15:48

## 2018-05-04 RX ADMIN — SODIUM CHLORIDE 75 ML/HR: 900 INJECTION, SOLUTION INTRAVENOUS at 20:00

## 2018-05-04 RX ADMIN — IOPAMIDOL 100 ML: 755 INJECTION, SOLUTION INTRAVENOUS at 13:44

## 2018-05-04 RX ADMIN — AMPICILLIN SODIUM AND SULBACTAM SODIUM 3 G: 2; 1 INJECTION, POWDER, FOR SOLUTION INTRAMUSCULAR; INTRAVENOUS at 16:54

## 2018-05-04 RX ADMIN — LORAZEPAM 1 MG: 2 INJECTION INTRAMUSCULAR; INTRAVENOUS at 17:17

## 2018-05-04 RX ADMIN — SODIUM CHLORIDE 50 ML/HR: 900 INJECTION, SOLUTION INTRAVENOUS at 13:44

## 2018-05-04 RX ADMIN — Medication 10 ML: at 22:00

## 2018-05-04 RX ADMIN — MORPHINE SULFATE 4 MG: 10 INJECTION INTRAVENOUS at 12:08

## 2018-05-04 RX ADMIN — SODIUM CHLORIDE 1200 ML: 900 INJECTION, SOLUTION INTRAVENOUS at 17:05

## 2018-05-04 NOTE — ED NOTES
Assumed care of pt from Meryl RN. Pt resting in bed. Reports her abscess near vagina is on the right labia and abscess near rectum is on the left. Pt reports hx of abscessess that had to be surgical removed.

## 2018-05-04 NOTE — ED TRIAGE NOTES
Pt. Complains of abscess on labia and another abscess in perianal area. Pt. Denies any fever, but has body aches.

## 2018-05-04 NOTE — H&P
Hospitalist Admission Note    NAME: Berry Johnson   :  1995   MRN:  [id number]     Date/Time:  2018 5:11 PM    Patient PCP: None  ______________________________________________________________________  Given the patient's current clinical presentation, I have a high level of concern for decompensation if discharged from the emergency department. Complex decision making was performed, which includes reviewing the patient's available past medical records, laboratory results, and x-ray films. My assessment of this patient's clinical condition and my plan of care is as follows. Assessment / Plan:  L labia and medial R buttock abscess  Lactic acidosis  -CT a/p showed small fluid collections in the L labia and medial R buttock  -obtain Bcx  -empiric abx with unasyn  -pain control with norco prn, tylenol prn  -consulting gen surg for I&D. Wound cx ordered  -repeat lactate  -gentle IVF (recieved fluid bolus in the ER)    T1DM  -last A1c in 2017 was 11, will repeat  -cont' home lantus 42 units qhs, SSI    Anxiety  -appears stable. Code Status: Full  Surrogate Decision Maker: pt's mom  DVT Prophylaxis: scds  GI Prophylaxis: not indicated  Baseline: independent       Subjective:   CHIEF COMPLAINT:     HISTORY OF PRESENT ILLNESS:     Berry Johnson is a 26 y.o.  female with PMHx significant for T1DM, anxiety, present to the ED c/o worsening of swelling, erythema, and pain in her R gluteal fold and L labia majora. Associated symptoms include fever and nausea and 1 episode of vomiting,  Pt states she has hx of boils/ hx of abscess in her groin area in the past, s/p drainage at [facility].  Pt states she has frequent abscesses when her BG is high, however they often resolved on their own. Pt denies any other associated symptoms.     Vitals: T 98.5, P133, /89, SPO2 100% on RA  Labs: WBC 9.5, , cr 0.64, lactate 2.9  We were asked to admit for work up and evaluation of the above problems. Past Medical History:   Diagnosis Date    Diabetes ([facility] [address]     Psychiatric disorder     anxiety        Past Surgical History:   Procedure Laterality Date    HX OTHER SURGICAL      abscess       Social History   Substance Use Topics    Smoking status: Never Smoker    Smokeless tobacco: Never Used    Alcohol use Yes      Comment: social        Family History   Problem Relation Age of Onset    Other Mother      Factor V Leiden with hx clots    Hypertension Mother     Elevated Lipids Mother     Lupus Father      No Known Allergies     Prior to Admission medications    Medication Sig Start Date End Date Taking? Authorizing Provider   insulin aspart (NOVOLOG) 100 unit/mL injection 1 Units by SubCUTAneous route three (3) times daily (with meals). Patient injects 1 unit for every 8 grams of carbohydrates. 11/29/17  Yes Inder Perez MD   insulin glargine (LANTUS) 100 unit/mL injection 42 Units by SubCUTAneous route daily. 11/29/17  Yes Inder Perez MD       REVIEW OF SYSTEMS:     I am not able to complete the review of systems because:    The patient is intubated and sedated    The patient has altered mental status due to his acute medical problems    The patient has baseline aphasia from prior stroke(s)    The patient has baseline dementia and is not reliable historian    The patient is in acute medical distress and unable to provide information           Total of 12 systems reviewed as follows:       POSITIVE= BOLD text  Negative = text not BOLD  General:  fever, chills, sweats, generalized weakness, weight loss/gain,      loss of appetite   Eyes:    blurred vision, eye pain, loss of vision, double vision  ENT:    rhinorrhea, pharyngitis   Respiratory:   cough, sputum production, SOB, CHOE, wheezing, pleuritic pain   Cardiology:   chest pain, palpitations, orthopnea, PND, edema, syncope   Gastrointestinal:  abdominal pain , N/V, diarrhea, dysphagia, constipation, bleeding   Genitourinary: Pain, erythema, swelling in gluteal fold  Muskuloskeletal :  arthralgia, myalgia, back pain  Hematology:  easy bruising, nose or gum bleeding, lymphadenopathy   Dermatological: rash, ulceration, pruritis, color change / jaundice  Endocrine:   hot flashes or polydipsia   Neurological:  headache, dizziness, confusion, focal weakness, paresthesia,     Speech difficulties, memory loss, gait difficulty  Psychological: Feelings of anxiety, depression, agitation    Objective:   VITALS:    Visit Vitals    /81 (BP 1 Location: Left arm)    Pulse (!) 115    Temp 99 °F (37.2 °C)    Resp 16    Ht 5' 6\" (1.676 m)    Wt 72.8 kg (160 lb 7.9 oz)    SpO2 97%    BMI 25.9 kg/m2       PHYSICAL EXAM:    General:    Alert, cooperative, no distress, appears stated age. HEENT: Atraumatic, anicteric sclerae, pink conjunctivae     No oral ulcers, mucosa moist, throat clear  Neck:  Supple, symmetrical,  thyroid: non tender  Lungs:   CTA b/l. No wheezing or Rhonchi. No rales. Chest wall:  No tenderness. No accessory muscle use. Heart:   Tachycardic, regular  rhythm,  No  Murmur. No edema  Abdomen:   Soft, NT. ND  BS+  Extremities: No cyanosis. No clubbing,      Abscess in R gluteal fold  Skin:     Not pale. Not Jaundiced  No rashes   Psych:  Not depressed. Not anxious or agitated. Neurologic: No facial asymmetry. No aphasia or slurred speech. Symmetrical strength, Sensation grossly intact.  AAOx4.     _______________________________________________________________________  Care Plan discussed with:    Comments   Patient x    Family      RN x    Care Manager                    Consultant:  leon ED physician   _______________________________________________________________________  Expected  Disposition:   Home with Family x   HH/PT/OT/RN    SNF/LTC    MARTIN    ________________________________________________________________________  TOTAL TIME:  72 Minutes    Critical Care Provided     Minutes non procedure based Comments    x Reviewed previous records   >50% of visit spent in counseling and coordination of care x Discussion with patient and/or family and questions answered       ________________________________________________________________________  Signed: Tabitha Harris MD    Procedures: see electronic medical records for all procedures/Xrays and details which were not copied into this note but were reviewed prior to creation of Plan. LAB DATA REVIEWED:    Recent Results (from the past 24 hour(s))   CBC WITH AUTOMATED DIFF    Collection Time: 05/04/18 11:33 AM   Result Value Ref Range    WBC 9.5 3.6 - 11.0 K/uL    RBC 4.09 3.80 - 5.20 M/uL    HGB 11.7 11.5 - 16.0 g/dL    HCT 35.5 35.0 - 47.0 %    MCV 86.8 80.0 - 99.0 FL    MCH 28.6 26.0 - 34.0 PG    MCHC 33.0 30.0 - 36.5 g/dL    RDW 15.0 (H) 11.5 - 14.5 %    PLATELET 129 819 - 129 K/uL    MPV 11.9 8.9 - 12.9 FL    NRBC 0.0 0  WBC    ABSOLUTE NRBC 0.00 0.00 - 0.01 K/uL    NEUTROPHILS 69 32 - 75 %    LYMPHOCYTES 23 12 - 49 %    MONOCYTES 6 5 - 13 %    EOSINOPHILS 1 0 - 7 %    BASOPHILS 0 0 - 1 %    IMMATURE GRANULOCYTES 0 0.0 - 0.5 %    ABS. NEUTROPHILS 6.6 1.8 - 8.0 K/UL    ABS. LYMPHOCYTES 2.2 0.8 - 3.5 K/UL    ABS. MONOCYTES 0.5 0.0 - 1.0 K/UL    ABS. EOSINOPHILS 0.1 0.0 - 0.4 K/UL    ABS. BASOPHILS 0.0 0.0 - 0.1 K/UL    ABS. IMM.  GRANS. 0.0 0.00 - 0.04 K/UL    DF AUTOMATED     HCG URINE, QL. - POC    Collection Time: 05/04/18 12:32 PM   Result Value Ref Range    Pregnancy test,urine (POC) NEGATIVE  NEG     METABOLIC PANEL, COMPREHENSIVE    Collection Time: 05/04/18 12:38 PM   Result Value Ref Range    Sodium 138 136 - 145 mmol/L    Potassium 4.9 3.5 - 5.1 mmol/L    Chloride 107 97 - 108 mmol/L    CO2 24 21 - 32 mmol/L    Anion gap 7 5 - 15 mmol/L    Glucose 153 (H) 65 - 100 mg/dL    BUN 18 6 - 20 MG/DL    Creatinine 0.64 0.55 - 1.02 MG/DL    BUN/Creatinine ratio 28 (H) 12 - 20      GFR est AA >60 >60 ml/min/1.73m2    GFR est non-AA >60 >60 ml/min/1.73m2 Calcium 8.2 (L) 8.5 - 10.1 MG/DL    Bilirubin, total 0.2 0.2 - 1.0 MG/DL    ALT (SGPT) 28 12 - 78 U/L    AST (SGOT) 23 15 - 37 U/L    Alk.  phosphatase 160 (H) 45 - 117 U/L    Protein, total 6.1 (L) 6.4 - 8.2 g/dL    Albumin 2.3 (L) 3.5 - 5.0 g/dL    Globulin 3.8 2.0 - 4.0 g/dL    A-G Ratio 0.6 (L) 1.1 - 2.2     LACTIC ACID    Collection Time: 05/04/18 12:38 PM   Result Value Ref Range    Lactic acid 2.9 (HH) 0.4 - 2.0 MMOL/L   POC LACTIC ACID    Collection Time: 05/04/18  4:55 PM   Result Value Ref Range    Lactic Acid (POC) 1.1 0.4 - 2.0 mmol/L

## 2018-05-04 NOTE — ED NOTES
Bedside shift change report given to David Almonte RN  (oncoming nurse) by Rexanne Hashimoto, RN  (offgoing nurse). Report included the following information SBAR, Kardex and OR Summary.      [place], [place] bedside to drain abscess on buttocks

## 2018-05-04 NOTE — ED PROVIDER NOTES
EMERGENCY DEPARTMENT HISTORY AND PHYSICAL EXAM      Date: 5/4/2018  Patient Name: Conor Smith    History of Presenting Illness     Chief Complaint   Patient presents with    Abscess     c/o two abscess to vaginal and buttocks x two days     EXPRESS  11:02 AM  IVY Ku has seen and evaluated this patient in the Express Care portion of triage for two abscess to groin and perianal region. The vital signs and the triage nurse assessment have been reviewed. The patient and any available family have been advised that the appropriate studies have been ordered to initiate the work up based on the clinical presentation during the assessment. The pt has been advised that they will be accommodated in an ED bed as soon as possible. The pt has been requested to contact the express care nurse or provider immediately if they experiences any changes in their condition during this brief waiting period. History Provided By: Patient and Patient's Mother    HPI: Conor Smith, 26 y.o. female with PMHx significant for DM and anxiety, presents ambulatory to the ED c/o gradually worsening painful areas of redness and swelling to the right buttock and left vaginal region with associated subjective fever and nausea x 2 days. Pt reports one episode of emesis two days ago, but denies any since. She denies any spontaneous drainage from the areas. Pt reports hx of abscesses and states current sxs are consistent. She notes they typically resolve without intervention, however has had one surgically drained in the past. Per mother, pt is insulin resistant and her blood sugars having been in the 300s at baseline as of late. She explains her endocrinologist is currently adjusting her insulin and medications, however pt is more prone to abscesses when she is hyperglycemic. Pt notes she was seen at Urgent Care two weeks ago for an abscess to the right groin, which has since resolved after abx without I&D.   Of note, per chart review pt was admitted to [facility] in 12/2017 for DKA. She specifically denies any diarrhea, dysuria, hematuria, chest pain, SOB, and abdominal pain. There are no other complaints, changes, or physical findings at this time.     PCP: None    Current Facility-Administered Medications   Medication Dose Route Frequency Provider Last Rate Last Dose    lidocaine-EPINEPHrine (PF) (XYLOCAINE) 2 %-1:200,000 injection 200 mg  10 mL SubCUTAneous [address], [place]        sodium chloride 0.9 % bolus infusion 1,200 mL  1,200 mL IntraVENous NOW Gunnar Matthews MD        sodium chloride (NS) flush 5-10 mL  5-10 mL IntraVENous PRN Gunnar Matthews MD        sodium chloride (NS) flush 5-10 mL  5-10 mL IntraVENous Q8H Bekah Garay MD        sodium chloride (NS) flush 5-10 mL  5-10 mL IntraVENous PRN Bekah Garay MD        0.9% sodium chloride infusion  75 mL/hr IntraVENous CONTINUOUS Bekah Garay MD        acetaminophen (TYLENOL) tablet 650 mg  650 mg Oral Q4H PRN Bekah Garay MD        oxyCODONE-acetaminophen (PERCOCET) 5-325 mg per tablet 1 Tab  1 Tab Oral Q4H PRN Bekah Garay MD        naloxone [facility]) injection 0.4 mg  0.4 mg IntraVENous PRN Bekah Garay MD        ondansetron [facility]) injection 4 mg  4 mg IntraVENous Q4H PRN Bekah Garay MD        bisacodyl (DULCOLAX) suppository 10 mg  10 mg Rectal DAILY PRN Bekah Garay MD        insulin lispro (HUMALOG) injection   SubCUTAneous AC&HS Bekah Garay MD        glucose chewable tablet 16 g  4 Tab Oral PRN Bekah Garay MD        dextrose (D50W) injection syrg 12.5-25 g  12.5-25 g IntraVENous PRN Bekah Garay MD        glucagon (GLUCAGEN) injection 1 mg  1 mg IntraMUSCular PRN Bekah Garay MD        ampicillin-sulbactam (UNASYN) 3 g in 0.9% sodium chloride (MBP/ADV) 100 mL  3 g IntraVENous Q8H Bekah Garay MD        insulin glargine (LANTUS) injection 40 Units  40 Units SubCUTAneous QHS Bekah Garay MD         Current Outpatient Prescriptions   Medication Sig Dispense Refill    insulin aspart (NOVOLOG) 100 unit/mL injection 1 Units by SubCUTAneous route three (3) times daily (with meals). Patient injects 1 unit for every 8 grams of carbohydrates. 30 Vial 1    insulin glargine (LANTUS) 100 unit/mL injection 42 Units by SubCUTAneous route daily. 30 Vial 1       Past History     Past Medical History:  Past Medical History:   Diagnosis Date    Diabetes ([address]     Psychiatric disorder     anxiety       Past Surgical History:  Past Surgical History:   Procedure Laterality Date    HX OTHER SURGICAL      abscess       Family History:  Family History   Problem Relation Age of Onset    Other Mother      Factor V Leiden with hx clots    Hypertension Mother     Elevated Lipids Mother     Lupus Father        Social History:  Social History   Substance Use Topics    Smoking status: Never Smoker    Smokeless tobacco: Never Used    Alcohol use Yes      Comment: social       Allergies:  No Known Allergies      Review of Systems   Review of Systems   Constitutional: Positive for fever (subjective). Negative for chills and fatigue. HENT: Negative for congestion, rhinorrhea and sore throat. Eyes: Negative for pain, discharge and visual disturbance. Respiratory: Negative for cough, chest tightness, shortness of breath and wheezing. Cardiovascular: Negative for chest pain, palpitations and leg swelling. Gastrointestinal: Positive for nausea and vomiting (resolved). Negative for abdominal pain, constipation and diarrhea. Genitourinary: Negative for dysuria, frequency and hematuria. Musculoskeletal: Negative for arthralgias, back pain and myalgias. Skin: Negative for rash.        + painful area of redness and swelling x 2   Neurological: Negative for dizziness, weakness, light-headedness and headaches. Psychiatric/Behavioral: Negative. Physical Exam   Physical Exam   Constitutional: She is oriented to person, place, and time. She appears well-developed and well-nourished. No distress. HENT:   Head: Normocephalic and atraumatic. Eyes: EOM are normal. Right eye exhibits no discharge. Left eye exhibits no discharge. No scleral icterus. Neck: Normal range of motion. Neck supple. No tracheal deviation present. Cardiovascular: Regular rhythm, normal heart sounds and intact distal pulses. Tachycardia present. Exam reveals no gallop and no friction rub. No murmur heard. Pulmonary/Chest: Effort normal and breath sounds normal. No respiratory distress. She has no wheezes. She has no rales. Abdominal: Soft. She exhibits no distension. There is no tenderness. Genitourinary:   Genitourinary Comments: Abscess to right medial buttock in gluteal fold with induration, fluctuance, and surrounding erythema. Left labia majora area of induration with minimal fluctuance. Musculoskeletal: Normal range of motion. She exhibits no edema. Lymphadenopathy:     She has no cervical adenopathy. Neurological: She is alert and oriented to person, place, and time. No focal neuro deficits   Skin: Skin is warm and dry. No rash noted. Psychiatric: She has a normal mood and affect. Nursing note and vitals reviewed. Diagnostic Study Results     Labs -     Recent Results (from the past 12 hour(s))   CBC WITH AUTOMATED DIFF    Collection Time: 05/04/18 11:33 AM   Result Value Ref Range    WBC 9.5 3.6 - 11.0 K/uL    RBC 4.09 3.80 - 5.20 M/uL    HGB 11.7 11.5 - 16.0 g/dL    HCT 35.5 35.0 - 47.0 %    MCV 86.8 80.0 - 99.0 FL    MCH 28.6 26.0 - 34.0 PG    MCHC 33.0 30.0 - 36.5 g/dL    RDW 15.0 (H) 11.5 - 14.5 %    PLATELET 300 935 - 586 K/uL    MPV 11.9 8.9 - 12.9 FL    NRBC 0.0 0  WBC    ABSOLUTE NRBC 0.00 0.00 - 0.01 K/uL    NEUTROPHILS 69 32 - 75 %    LYMPHOCYTES 23 12 - 49 %    MONOCYTES 6 5 - 13 %    EOSINOPHILS 1 0 - 7 %    BASOPHILS 0 0 - 1 %    IMMATURE GRANULOCYTES 0 0.0 - 0.5 %    ABS. NEUTROPHILS 6.6 1.8 - 8.0 K/UL    ABS. LYMPHOCYTES 2.2 0.8 - 3.5 K/UL    ABS.  MONOCYTES 0.5 0.0 - 1.0 K/UL    ABS. EOSINOPHILS 0.1 0.0 - 0.4 K/UL    ABS. BASOPHILS 0.0 0.0 - 0.1 K/UL    ABS. IMM. GRANS. 0.0 0.00 - 0.04 K/UL    DF AUTOMATED     HCG URINE, QL. - POC    Collection Time: 05/04/18 12:32 PM   Result Value Ref Range    Pregnancy test,urine (POC) NEGATIVE  NEG     METABOLIC PANEL, COMPREHENSIVE    Collection Time: 05/04/18 12:38 PM   Result Value Ref Range    Sodium 138 136 - 145 mmol/L    Potassium 4.9 3.5 - 5.1 mmol/L    Chloride 107 97 - 108 mmol/L    CO2 24 21 - 32 mmol/L    Anion gap 7 5 - 15 mmol/L    Glucose 153 (H) 65 - 100 mg/dL    BUN 18 6 - 20 MG/DL    Creatinine 0.64 0.55 - 1.02 MG/DL    BUN/Creatinine ratio 28 (H) 12 - 20      GFR est AA >60 >60 ml/min/1.73m2    GFR est non-AA >60 >60 ml/min/1.73m2    Calcium 8.2 (L) 8.5 - 10.1 MG/DL    Bilirubin, total 0.2 0.2 - 1.0 MG/DL    ALT (SGPT) 28 12 - 78 U/L    AST (SGOT) 23 15 - 37 U/L    Alk. phosphatase 160 (H) 45 - 117 U/L    Protein, total 6.1 (L) 6.4 - 8.2 g/dL    Albumin 2.3 (L) 3.5 - 5.0 g/dL    Globulin 3.8 2.0 - 4.0 g/dL    A-G Ratio 0.6 (L) 1.1 - 2.2     LACTIC ACID    Collection Time: 05/04/18 12:38 PM   Result Value Ref Range    Lactic acid 2.9 (HH) 0.4 - 2.0 MMOL/L       Radiologic Studies -     CT Results  (Last 48 hours)               05/04/18 1344  CT ABD PELV W CONT Final result    Impression:  IMPRESSION:    1. No perirectal abscess identified. 2. Small fluid collections in the left labia and medial right buttock. Narrative:  EXAM: CT ABDOMEN PELVIS WITH CONTRAST   INDICATION: Perianal abscess, labial abscess, evaluate for perirectal abscess. COMPARISON: 12/8/2017. CONTRAST: 100 mL of Isovue-370. TECHNIQUE:    Multislice helical CT was performed from the diaphragm to the symphysis pubis   during uneventful rapid bolus intravenous contrast administration. Oral contrast   was not administered. Contiguous 5 mm axial images were reconstructed and lung   and soft tissue windows were generated.  Coronal and sagittal reformations were   generated. CT dose reduction was achieved through use of a standardized protocol   tailored for this examination and automatic exposure control for dose   modulation. FINDINGS:   LOWER CHEST: The visualized portions of the lung bases are clear. ABDOMEN:   Liver: The liver is normal in size and contour with no focal abnormality. Imaging was accomplished prior to optimal enhancement of the liver. The hepatic   veins are not enhanced. Gallbladder and bile ducts: There is no calcified gallstone or biliary   dilatation. Spleen: No abnormality. Pancreas: No abnormality. Adrenal glands: No abnormality. Kidneys: No abnormality. PELVIS:   Reproductive organs: The uterus is normal.    Bladder: No abnormality. BOWEL AND MESENTERY: The small bowel is normal.  There is no mesenteric mass or   adenopathy. The appendix is normal.   PERITONEUM: There is a small amount of ascites in the pelvis. RETROPERITONEUM: The aorta  tapers without aneurysm. There is no retroperitoneal   adenopathy or mass. There is no pelvic mass or adenopathy. BONES AND SOFT TISSUES: The bones and soft tissues of the abdominal wall are   within normal limits. There is a small 2.6 x 1.5 x 0.9 cm fluid collection in   the left labia and there is a small 3.1 x 1.7 x 3.5 cm fluid collection in the   right medial buttock. There is no perirectal collection. Medical Decision Making   I am the first provider for this patient. I reviewed the vital signs, available nursing notes, past medical history, past surgical history, family history and social history. Vital Signs-Reviewed the patient's vital signs.   Patient Vitals for the past 12 hrs:   Temp Pulse Resp BP SpO2   05/04/18 1043 98.5 °F (36.9 °C) (!) 133 18 141/89 100 %       Records Reviewed: Nursing Notes and Old Medical Records    Provider Notes (Medical Decision Making):   Differential diagnosis includes abscess, cellulitis, perirectal abscess, perianal abscess, dehydration, electrolyte abnormality, hyperglycemia, DKA    Disposition: 27 y/o female who presents to ED with abscess to right medical buttock. No evidence of perirectal abscess on CT abdomen/pelvis. Right medical buttock abscess I&D'd at bedside. Pt also has area of induration to labia but do not appreciate significant fluctuance so will defer I&D at this time. Pt is tachycardic with elevated lactate. Blood cultures sent. Will treat with abx and fluids, and admit for further management. ED Course:   Initial assessment performed. The patients presenting problems have been discussed, and they are in agreement with the care plan formulated and outlined with them. I have encouraged them to ask questions as they arise throughout their visit. Procedure Note - Incision and Drainage:   5:05 PM  Performed by: Tasha Novak MD  Complexity: complex  Skin prepped with Betadine. Sterile field established. Anesthesia achieved with 3 mLs of Lidocaine 1% with epinephrine using a local infiltration. Abscess to right medial gluteal fold was incised with # 11 blade, and some bloody drainage was expressed. A second incision was made to the right medial gluteal fold with #11, anesthesia was achieved with another 3 mLs of Lidocaine 1% with epinephrine using local infiltration, and large amount of purulent drainage was expressed. Wound probed and irrigated. Area was packed using 0.25 inch iodoform gauze. Sterile dressing applied. Estimated blood loss: 15 cc's  The procedure took 16-30 minutes, and pt tolerated well. PROGRESS NOTE:  3:56 PM  Attempted I&D, however pt requested I wait until her mother returns. Written by LAQUITA Silver, as dictated by Tasha Novak MD.    CONSULT NOTE:   4:02 PM  Tasha Novak MD spoke with Ilda Carbajal MD   Specialty: Hospitalist  Discussed pt's hx, disposition, and available diagnostic and imaging results.  Reviewed care plans. Consultant will evaluate pt for admission. Dr. Lita Walden would like us to consult with general surgery. Written by LAQUITA Kelley, as dictated by Sheryl Tang MD.    PROGRESS NOTE:  4:11 PM  Dr. Lita Walden called back and states she will admit pt. Written by LAQUITA Kelley, as dictated by Sheryl Tang MD.    CONSULT NOTE:   4:12 PM  Sheryl Tang MD spoke with Jamal Bustos MD   Specialty: general surgery  Discussed pt's hx, disposition, and available diagnostic and imaging results. Reviewed care plans. Consultant agrees with plans as outlined. Dr. Maria Guadalupe Chavez is aware of pt. Written by LAQUITA Kelley, as dictated by Sheryl Tang MD.    Disposition:    ADMIT NOTE:  4:33 PM  The patient is being admitted to the hospital by Luis Alberto Ramirez MD.  The results of their tests and reasons for their admission have been discussed with the patient and/or available family. They convey agreement and understanding for the need to be admitted and for their admission diagnosis. PLAN:  1. Admit to hospitalist     Diagnosis     Clinical Impression:   1. Abscess of right buttock    2. Labial abscess    3. Lactic acidosis        Attestations: This note is prepared by Miah Gilliland, acting as a Scribe for Sheryl Tang MD.    Sheryl Tang MD: The scribe's documentation has been prepared under my direction and personally reviewed by me in its entirety. I confirm that the notes above accurately reflects all work, treatment, procedures, and medical decision making performed by me. This note will not be viewable in [address]

## 2018-05-04 NOTE — IP AVS SNAPSHOT
[address] [facility] [address]. [phone] Patient: Yvon Kelly MRN: [id number] :1995 You are allergic to the following No active allergies Recent Documentation Height Weight Breastfeeding? BMI Smoking Status 1.676 m 72.8 kg No 25.9 kg/m2 Never Smoker Unresulted Labs-Please follow up with your PCP about these lab tests Order Current Status CULTURE, CHLAMYDIA In process Emergency Contacts  (Rel.) Home Phone Work Phone Mobile Phone Honey Fairbanks (Mother) [phone] -- -- About your hospitalization You were admitted on: May 4, 2018 You last received care in the:  [address] You were discharged on:  May 11, 2018 Why you were hospitalized Your primary diagnosis was:  Not on File Your diagnoses also included:  Sepsis (Hcc) Providers Seen During Your Hospitalization Provider Specialty Primary office phone Marie Ford DO Emergency Medicine [phone] Yolanda Duncan MD Emergency Medicine [phone] Jenaro Sheets MD Internal Medicine [phone] Bernie Lacey MD Internal Medicine [phone] Viry Garsia MD Internal Medicine [phone] Mendel Rizo MD Hospitalist [phone] Pine Prairietran Guillermo, [address] [place] [address] Internal Medicine [phone] Your Primary Care Physician (PCP) Primary Care Physician Office Phone Office Fax Mary Jo Santiago [phone] Follow-up Information Follow up With Details Comments Contact Info Chastity Duran MD Go on 2018 Hospital follow-up scheduled at 10:45am (If you have questions or need to reschedule please call  [address] [phone] [facility] In 2 days Expect a phone call from [place] to schedule a follow up visit with you in 24-48 hours.  [facility] works with [facility] and [address] [place] [address] to provide patients with improved access to quality acute care services. They strive to improve patient outcomes while helping to prevent hospital readmissions. Contact #[phone] Paulina Wyatt. In 1 week for packing removal [address]   [phone] My Medications TAKE these medications as instructed Instructions Each Dose to Equal  
 Morning Noon Evening Bedtime  
 cefdinir 300 mg capsule Commonly known as:  OMNICEF Your last dose was: Your next dose is: Take 1 Cap by mouth two (2) times a day. 300 mg  
    
   
   
   
  
 insulin aspart U-100 100 unit/mL injection Commonly known as:  NovoLOG U-100 Insulin aspart Your last dose was: Your next dose is:    
   
   
 1 Units by SubCUTAneous route three (3) times daily (with meals). Patient injects 1 unit for every 8 grams of carbohydrates. 1 Units  
    
   
   
   
  
 insulin glargine 100 unit/mL injection Commonly known as:  LANTUS Your last dose was: Your next dose is:    
   
   
 38 Units by SubCUTAneous route nightly. 38 Units  
    
   
   
   
  
 oxyCODONE-acetaminophen  mg per tablet Commonly known as:  PERCOCET 10 Your last dose was: Your next dose is: Take 1 Tab by mouth every six (6) hours as needed. Max Daily Amount: 4 Tabs. 1 Tab Where to Get Your Medications Information on where to get these meds will be given to you by the nurse or doctor. ! Ask your nurse or doctor about these medications  
  cefdinir 300 mg capsule  
 insulin glargine 100 unit/mL injection  
 oxyCODONE-acetaminophen  mg per tablet Discharge Instructions HOSPITALIST DISCHARGE INSTRUCTIONS 
 
NAME: Kleber Ford :  1995 MRN:  [id number] Date/Time:  5/11/2018 1:34 PM 
 
ADMIT DATE: 5/4/2018 DISCHARGE DATE: 5/11/2018 Attending Physician: Teo Hinlke DO 
 
DISCHARGE DIAGNOSIS: 
 
Skin Abscesses. Finish your antibiotic course by taking omnicef 300mg twice a day for another 7 days until gone. [facility] within one week for packing removal. 
 
Type 1 diabetes. You had less lantus insulin during your hospital stay. Decrease you lantus to 38units daily MEDICATIONS: 
See above · It is important that you take the medication exactly as they are prescribed. · Keep your medication in the bottles provided by the pharmacist and keep a list of the medication names, dosages, and times to be taken in your wallet. · Do not take other medications without consulting your doctor. Pain Management: per above medications What to do at [facility] Recommended diet:  diabetic Recommended activity: no soaks or baths until otherwise advised. If you have questions regarding the hospital related prescriptions or hospital related issues please call [facility] at . You can always direct your questions to your primary care doctor if you are unable to reach your hospital physician; your PCP works as an extension of your hospital doctor just like your hospital doctor is an extension of your PCP for your time at [address] If you experience any of the following symptoms then please call your primary care physician or return to the emergency room if you cannot get hold of your doctor: 
Fever, chills, nausea, vomiting, diarrhea, change in mentation, falling, bleeding, shortness of breath Additional Instructions: 
 
 
Bring these papers with you to your follow up appointments. The papers will help your doctors be sure to continue the care plan from the hospital. 
 
 
 
 
 
 
Information obtained by : 
I understand that if any problems occur once I am at home I am to contact my physician. I understand and acknowledge receipt of the instructions indicated above. Physician's or R.N.'s Signature                                                                  Date/Time Patient or Representative Signature                                                          Da 
 
Discharge Orders None Introducing [facility]! Sravani Eduardo introduces [employer] patient portal. Now you can access parts of your medical record, email your doctor's office, and request medication refills online. 1. In your internet browser, go to [email or web address] 2. Click on the First Time User? Click Here link in the Sign In box. You will see the New Member Sign Up page. 3. Enter your Bundlr Access Code exactly as it appears below. You will not need to use this code after youve completed the sign-up process. If you do not sign up before the expiration date, you must request a new code. · [employer] Access Code: [email or web address] Expires: 8/2/2018 10:43 AM 
 
4. Enter the last four digits of your Social Security Number (xxxx) and Date of Birth (mm/dd/yyyy) as indicated and click Submit. You will be taken to the next sign-up page. 5. Create a Bundlr ID. This will be your Bundlr login ID and cannot be changed, so think of one that is secure and easy to remember. 6. Create a [employer]lr password. You can change your password at any time. 7. Enter your Password Reset Question and Answer. This can be used at a later time if you forget your password. 8. Enter your e-mail address. You will receive e-mail notification when new information is available in [address] 9. Click Sign Up. You can now view and download portions of your medical record. 10. Click the Download Summary menu link to download a portable copy of your medical information. If you have questions, please visit the Frequently Asked Questions section of the [employer] website. Remember, [employer] is NOT to be used for urgent needs. For medical emergencies, dial 911. Now available from your iPhone and Android! General Information Please provide this summary of care documentation to your next provider. Patient Signature:  ____________________________________________________________ Date:  ____________________________________________________________  
  
[facility] Provider Signature:  ____________________________________________________________ Date:  ____________________________________________________________

## 2018-05-04 NOTE — ED NOTES
Pt wishes to not proceed with I&D until mother gets here. Pt will also be admitted for IV abt's due to infection and being a DM.

## 2018-05-05 LAB
ANION GAP SERPL CALC-SCNC: 12 MMOL/L (ref 5–15)
BASOPHILS # BLD: 0 K/UL (ref 0–0.1)
BASOPHILS NFR BLD: 0 % (ref 0–1)
BUN SERPL-MCNC: 10 MG/DL (ref 6–20)
BUN/CREAT SERPL: 11 (ref 12–20)
CALCIUM SERPL-MCNC: 8.1 MG/DL (ref 8.5–10.1)
CHLORIDE SERPL-SCNC: 104 MMOL/L (ref 97–108)
CO2 SERPL-SCNC: 21 MMOL/L (ref 21–32)
CREAT SERPL-MCNC: 0.87 MG/DL (ref 0.55–1.02)
DIFFERENTIAL METHOD BLD: ABNORMAL
EOSINOPHIL # BLD: 0.2 K/UL (ref 0–0.4)
EOSINOPHIL NFR BLD: 2 % (ref 0–7)
ERYTHROCYTE [DISTWIDTH] IN BLOOD BY AUTOMATED COUNT: 14.9 % (ref 11.5–14.5)
GLUCOSE BLD STRIP.AUTO-MCNC: 131 MG/DL (ref 65–100)
GLUCOSE BLD STRIP.AUTO-MCNC: 143 MG/DL (ref 65–100)
GLUCOSE BLD STRIP.AUTO-MCNC: 199 MG/DL (ref 65–100)
GLUCOSE BLD STRIP.AUTO-MCNC: 57 MG/DL (ref 65–100)
GLUCOSE BLD STRIP.AUTO-MCNC: 88 MG/DL (ref 65–100)
GLUCOSE SERPL-MCNC: 340 MG/DL (ref 65–100)
HCT VFR BLD AUTO: 30.9 % (ref 35–47)
HGB BLD-MCNC: 9.7 G/DL (ref 11.5–16)
IMM GRANULOCYTES # BLD: 0 K/UL (ref 0–0.04)
IMM GRANULOCYTES NFR BLD AUTO: 0 % (ref 0–0.5)
LYMPHOCYTES # BLD: 2.1 K/UL (ref 0.8–3.5)
LYMPHOCYTES NFR BLD: 23 % (ref 12–49)
MCH RBC QN AUTO: 28.2 PG (ref 26–34)
MCHC RBC AUTO-ENTMCNC: 31.4 G/DL (ref 30–36.5)
MCV RBC AUTO: 89.8 FL (ref 80–99)
MONOCYTES # BLD: 0.6 K/UL (ref 0–1)
MONOCYTES NFR BLD: 6 % (ref 5–13)
NEUTS SEG # BLD: 6.6 K/UL (ref 1.8–8)
NEUTS SEG NFR BLD: 69 % (ref 32–75)
NRBC # BLD: 0 K/UL (ref 0–0.01)
NRBC BLD-RTO: 0 PER 100 WBC
PLATELET # BLD AUTO: 322 K/UL (ref 150–400)
PMV BLD AUTO: 9.9 FL (ref 8.9–12.9)
POTASSIUM SERPL-SCNC: 4.1 MMOL/L (ref 3.5–5.1)
RBC # BLD AUTO: 3.44 M/UL (ref 3.8–5.2)
SERVICE CMNT-IMP: ABNORMAL
SERVICE CMNT-IMP: NORMAL
SODIUM SERPL-SCNC: 137 MMOL/L (ref 136–145)
WBC # BLD AUTO: 9.5 K/UL (ref 3.6–11)

## 2018-05-05 PROCEDURE — 36415 COLL VENOUS BLD VENIPUNCTURE: CPT | Performed by: INTERNAL MEDICINE

## 2018-05-05 PROCEDURE — 77030019895 HC PCKNG STRP IODO -A

## 2018-05-05 PROCEDURE — 85025 COMPLETE CBC W/AUTO DIFF WBC: CPT | Performed by: INTERNAL MEDICINE

## 2018-05-05 PROCEDURE — 74011250637 HC RX REV CODE- 250/637: Performed by: INTERNAL MEDICINE

## 2018-05-05 PROCEDURE — 82962 GLUCOSE BLOOD TEST: CPT

## 2018-05-05 PROCEDURE — 74011000258 HC RX REV CODE- 258: Performed by: INTERNAL MEDICINE

## 2018-05-05 PROCEDURE — 74011636637 HC RX REV CODE- 636/637: Performed by: INTERNAL MEDICINE

## 2018-05-05 PROCEDURE — 74011250636 HC RX REV CODE- 250/636: Performed by: INTERNAL MEDICINE

## 2018-05-05 PROCEDURE — 80048 BASIC METABOLIC PNL TOTAL CA: CPT | Performed by: INTERNAL MEDICINE

## 2018-05-05 PROCEDURE — 65270000015 HC RM PRIVATE ONCOLOGY

## 2018-05-05 RX ORDER — INSULIN GLARGINE 100 [IU]/ML
46 INJECTION, SOLUTION SUBCUTANEOUS
Status: DISCONTINUED | OUTPATIENT
Start: 2018-05-05 | End: 2018-05-05

## 2018-05-05 RX ORDER — OXYCODONE AND ACETAMINOPHEN 7.5; 325 MG/1; MG/1
1 TABLET ORAL
Status: DISCONTINUED | OUTPATIENT
Start: 2018-05-05 | End: 2018-05-06

## 2018-05-05 RX ORDER — INSULIN GLARGINE 100 [IU]/ML
42 INJECTION, SOLUTION SUBCUTANEOUS
Status: DISCONTINUED | OUTPATIENT
Start: 2018-05-05 | End: 2018-05-08

## 2018-05-05 RX ORDER — SODIUM CHLORIDE 0.9 % (FLUSH) 0.9 %
SYRINGE (ML) INJECTION
Status: DISPENSED
Start: 2018-05-05 | End: 2018-05-06

## 2018-05-05 RX ADMIN — OXYCODONE HYDROCHLORIDE AND ACETAMINOPHEN 1 TABLET: 7.5; 325 TABLET ORAL at 21:18

## 2018-05-05 RX ADMIN — AMPICILLIN SODIUM AND SULBACTAM SODIUM 3 G: 2; 1 INJECTION, POWDER, FOR SOLUTION INTRAMUSCULAR; INTRAVENOUS at 16:19

## 2018-05-05 RX ADMIN — INSULIN GLARGINE 42 UNITS: 100 INJECTION, SOLUTION SUBCUTANEOUS at 21:17

## 2018-05-05 RX ADMIN — AMPICILLIN SODIUM AND SULBACTAM SODIUM 3 G: 2; 1 INJECTION, POWDER, FOR SOLUTION INTRAMUSCULAR; INTRAVENOUS at 00:54

## 2018-05-05 RX ADMIN — Medication 10 ML: at 14:58

## 2018-05-05 RX ADMIN — AMPICILLIN SODIUM AND SULBACTAM SODIUM 3 G: 2; 1 INJECTION, POWDER, FOR SOLUTION INTRAMUSCULAR; INTRAVENOUS at 08:46

## 2018-05-05 RX ADMIN — OXYCODONE HYDROCHLORIDE AND ACETAMINOPHEN 1 TABLET: 5; 325 TABLET ORAL at 08:24

## 2018-05-05 RX ADMIN — OXYCODONE HYDROCHLORIDE AND ACETAMINOPHEN 1 TABLET: 5; 325 TABLET ORAL at 00:59

## 2018-05-05 RX ADMIN — OXYCODONE HYDROCHLORIDE AND ACETAMINOPHEN 1 TABLET: 5; 325 TABLET ORAL at 12:24

## 2018-05-05 RX ADMIN — ACETAMINOPHEN 650 MG: 325 TABLET ORAL at 22:51

## 2018-05-05 RX ADMIN — SODIUM CHLORIDE 75 ML/HR: 900 INJECTION, SOLUTION INTRAVENOUS at 21:20

## 2018-05-05 RX ADMIN — SODIUM CHLORIDE 75 ML/HR: 900 INJECTION, SOLUTION INTRAVENOUS at 08:49

## 2018-05-05 RX ADMIN — Medication 10 ML: at 21:17

## 2018-05-05 NOTE — PROGRESS NOTES
Oncology Nursing Communication Tool  7:44 AM  5/5/2018     Bedside shift change report given to Nguyen Pulliam RN (incoming nurse) by Carlos Eduardo Goddard RN (outgoing nurse) on Sarah Borden. Report included the following information SBAR, Kardex and Accordion. Shift Summary:       Issues for physician to address: Oncology Shift Note   Admission Date 5/4/2018   Admission Diagnosis Sepsis ([address]   Code Status Full Code   Consults IP CONSULT TO GENERAL SURGERY      Cardiac Monitoring [] Yes [] No      Purposeful Hourly Rounding [] Yes    Chritsina Score Total Score: 1   Christina score 3 or > [] Bed Alarm [] Avasys [] 1:1 sitter [] Patient refused (Place signed refusal form in chart)      Pain Managed [] Yes [] No    Key Pain Meds     The patient is on no pain meds. Influenza Vaccine Received Flu Vaccine for Current Season (usually Sept-March): Not Flu Season           Oxygen needs?  [] Room air Oxygen @  []1L    []2L    []3L   []4L    []5L   []6L     Use home O2? [] Yes [] No  Perform O2 challenge test using  smartphrase (.oxygenchallenge)      Last bowel movement    bowel movement      Urinary Catheter             LDAs               Peripheral IV 05/04/18 Right Forearm (Active)   Site Assessment Clean, dry, & intact 5/5/2018  2:18 AM   Phlebitis Assessment 0 5/5/2018  2:18 AM   Infiltration Assessment 0 5/5/2018  2:18 AM   Dressing Status Clean, dry, & intact 5/5/2018  2:18 AM   Dressing Type Tape 5/5/2018  2:18 AM   Hub Color/Line Status Pink 5/5/2018  2:18 AM       Peripheral IV 05/04/18 Left Antecubital (Active)   Site Assessment Clean, dry, & intact 5/5/2018  2:18 AM   Phlebitis Assessment 0 5/5/2018  2:18 AM   Infiltration Assessment 0 5/5/2018  2:18 AM   Dressing Status Clean, dry, & intact 5/5/2018  2:18 AM   Dressing Type Topical skin adhesive 5/5/2018  2:18 AM                         Readmission Risk Assessment Tool Score Low Risk            7       Total Score        3 Has Seen PCP in Last 6 Months (Yes=3, No=0)    4 IP Visits Last 12 Months (1-3=4, 4=9, >4=11)        Criteria that do not apply:    . Living with Significant Other. Assisted Living. LTAC. SNF. or   Rehab    Patient Length of Stay (>5 days = 3)    Pt.  Coverage (Medicare=5 , Medicaid, or Self-Pay=4)    Charlson Comorbidity Score (Age + Comorbid Conditions)       Expected Length of Stay - - -   Actual Length of Stay [address] [place] [address]

## 2018-05-05 NOTE — CONSULTS
Called Gia, patient's home health nurse and she states she has to call to report there is a potential interaction between Asper Cream and Warfarin. She states patient uses Asper Cream PRN to shoulder and has been for years.   Overall patient is doing well. No changes.   Advised Gia continue to monitor and call with any changes, questions or concerns. She is in agreement and Verbalized understanding.     Dr. Ponce notified, okay to continue medication.    Surgery Consult    Subjective:      Ramos Rosenbaum is a 26 y.o. female who presents for evaluation of right buttocks abscess. This has been present for 2 days and she has had similar episodes previously, in the groin. The abscess was drained in the ED and packed with nu guaze. She has some persistent pain but is improved from presentation last night. She denies fevers or chills. She is diabetic.     Past Medical History:   Diagnosis Date    Diabetes ([address]     Psychiatric disorder     anxiety     Past Surgical History:   Procedure Laterality Date    HX OTHER SURGICAL      abscess      Family History   Problem Relation Age of Onset    Other Mother      Factor V Leiden with hx clots    Hypertension Mother    [facility] Elevated Lipids Mother     Lupus Father      Social History     Social History    Marital status: SINGLE     Spouse name: N/A    Number of children: N/A    Years of education: N/A     Social History Main Topics    Smoking status: Never Smoker    Smokeless tobacco: Never Used    Alcohol use Yes      Comment: social    Drug use: No    Sexual activity: No     Other Topics Concern    None     Social History Narrative      Current Facility-Administered Medications   Medication Dose Route Frequency Provider Last Rate Last Dose    sodium chloride (NS) flush 5-10 mL  5-10 mL IntraVENous PRN Sheryl Tang MD        sodium chloride (NS) flush 5-10 mL  5-10 mL IntraVENous Q8H Luis Alberto ALVAREZ MD   10 mL at 05/04/18 2200    sodium chloride (NS) flush 5-10 mL  5-10 mL IntraVENous PRN Paris Hoff MD        0.9% sodium chloride infusion  75 mL/hr IntraVENous CONTINUOUS Luis Alberto ALVAREZ MD 75 mL/hr at 05/04/18 2000 75 mL/hr at 05/04/18 2000    acetaminophen (TYLENOL) tablet 650 mg  650 mg Oral Q4H PRN Paris Hoff MD        oxyCODONE-acetaminophen (PERCOCET) 5-325 mg per tablet 1 Tab  1 Tab Oral Q4H PRN Paris Hoff MD   1 Tab at 05/05/18 0059    naloxone (NARCAN) injection 0.4 mg  0.4 mg IntraVENous PRN Paris Peat, MD        ondansetron (ZOFRAN) injection 4 mg  4 mg IntraVENous Q4H PRN Paris Hoff MD        bisacodyl (DULCOLAX) suppository 10 mg  10 mg Rectal DAILY PRN Paris Hoff MD        insulin lispro (HUMALOG) injection   SubCUTAneous AC&HS Paris Hoff MD   1 Units at 18    glucose chewable tablet 16 g  4 Tab Oral PRN Paris Hoff MD        dextrose (D50W) injection syrg 12.5-25 g  12.5-25 g IntraVENous PRN Paris Hoff MD        glucagon (GLUCAGEN) injection 1 mg  1 mg IntraMUSCular PRN Paris Hoff MD        ampicillin-sulbactam (UNASYN) 3 g in 0.9% sodium chloride (MBP/ADV) 100 mL  3 g IntraVENous Q8H Luis Alberto ALVAREZ  mL/hr at 18 0054 3 g at 18 0054    insulin glargine (LANTUS) injection 42 Units  42 Units SubCUTAneous QHS Paris Hoff MD   42 Units at 18    insulin lispro (HUMALOG) injection   SubCUTAneous TID WITH MEALS Paris Hoff MD            No Known Allergies    Review of Systems:  Pertinent items are noted in the History of Present Illness. Objective:      Patient Vitals for the past 8 hrs:   BP Temp Pulse Resp SpO2   18 0730 134/80 98.4 °F (36.9 °C) (!) 105 16 98 %   18 0303 146/82 98.4 °F (36.9 °C) (!) 125 18 99 %       Temp (24hrs), Av.5 °F (36.9 °C), Min:98.1 °F (36.7 °C), Max:99 °F (37.2 °C)      Physical Exam:  GENERAL: alert, cooperative, no distress, appears stated age, LUNG: clear to auscultation bilaterally, HEART: regular rate and rhythm, ABDOMEN: soft, non-tender. EXTREMITIES:  extremities normal, atraumatic, no cyanosis or edema, SKIN: stab wound right buttocks near perianal area with no significant surrounding cellulitis or fluctuance. Packing changed, no active drainage.     Assessment:     Hospital Problems  Date Reviewed: 2017          Codes Class Noted POA    Sepsis [facility]) ICD-10-CM: A41.9  ICD-9-CM: 038.9, 995.91  2018 Unknown              Plan:     Status post adequate drainage in ED of perianal buttocks abscess, responding well.  Continue bid packing changes with saline moistened 1/4\" strip gauze. Continue empiric antibiotics and adjust per culture results. Will see again tomorrow.     Signed By: Philip Trujillo MD, [facility]    May 5, 2018

## 2018-05-05 NOTE — PROGRESS NOTES
Oncology Nursing Communication Tool  6:52 PM  5/5/2018     Bedside shift change report given to [facility] AT CIRILO DUBON RN (incoming nurse) by Rafa Mcmanus RN (outgoing nurse) on Donn Palumbo. Report included the following information SBAR. Shift Summary:       Issues for physician to address: Oncology Shift Note   Admission Date 5/4/2018   Admission Diagnosis Sepsis ([address]   Code Status Full Code   Consults IP CONSULT TO GENERAL SURGERY      Cardiac Monitoring [] Yes [] No      Purposeful Hourly Rounding [] Yes    Christina Score Total Score: 1   Christina score 3 or > [] Bed Alarm [] Avasys [] 1:1 sitter [] Patient refused (Place signed refusal form in chart)      Pain Managed [x] Yes [] No    Key Pain Meds     The patient is on no pain meds. Influenza Vaccine Received Flu Vaccine for Current Season (usually Sept-March): Not Flu Season           Oxygen needs?  [] Room air Oxygen @  []1L    []2L    []3L   []4L    []5L   []6L     Use home O2? [] Yes [] No  Perform O2 challenge test using  smartphrase (.oxygenchallenge)      Last bowel movement    bowel movement      Urinary Catheter             LDAs               Peripheral IV 05/04/18 Right Forearm (Active)   Site Assessment Clean, dry, & intact 5/5/2018 10:20 AM   Phlebitis Assessment 0 5/5/2018 10:20 AM   Infiltration Assessment 0 5/5/2018 10:20 AM   Dressing Status Clean, dry, & intact 5/5/2018 10:20 AM   Dressing Type Tape 5/5/2018  2:18 AM   Hub Color/Line Status Pink 5/5/2018 10:20 AM   Alcohol Cap Used Yes 5/5/2018 10:20 AM       Peripheral IV 05/04/18 Left Antecubital (Active)   Site Assessment Clean, dry, & intact 5/5/2018 10:20 AM   Phlebitis Assessment 0 5/5/2018 10:20 AM   Infiltration Assessment 0 5/5/2018 10:20 AM   Dressing Status Clean, dry, & intact 5/5/2018 10:20 AM   Dressing Type Topical skin adhesive 5/5/2018  2:18 AM   Alcohol Cap Used Yes 5/5/2018 10:20 AM                         Readmission Risk Assessment Tool Score Low Risk 7       Total Score        3 Has Seen PCP in Last 6 Months (Yes=3, No=0)    4 IP Visits Last 12 Months (1-3=4, 4=9, >4=11)        Criteria that do not apply:    . Living with Significant Other. Assisted Living. LTAC. SNF. or   Rehab    Patient Length of Stay (>5 days = 3)    Pt.  Coverage (Medicare=5 , Medicaid, or Self-Pay=4)    Charlson Comorbidity Score (Age + Comorbid Conditions)       Expected Length of Stay - - -   Actual Length of Stay 1          Yakelin Napier RN

## 2018-05-05 NOTE — PROGRESS NOTES
Nutrition Assessment:    RECOMMENDATIONS:   Continue Diabetic diet  Recommend referral to [facility] for outpatient counseling   Please limit outside food brought in by family (pt with multiple regular Gatorades in the room and BG >300)     DIETITIANS INTERVENTIONS/PLAN:   Continue Diabetic diet  Encourage compliance  Monitor appetite/PO intake  Monitor BG     ASSESSMENT:   Pt admitted with sepsis. PMH: DM.  Chart reviewed, pt lying in bed awake and alert. She reports a poor appetite at home recently 2' stress and subsequent wt loss of 8lb. She is s/p abscess drainage in ER and states that pain is keeping her from eating. Noted in nursing notes that family/significant other are bringing in junk and fast food which the pt is eating. Observed mostly empty bottles of regular Gatorade on the bedside table as well. Pt's BG is uncontrolled (199-345, HgbA1c 11.7). I educated her that glycemic control is crucial for proper healing. She declines trying Glucerna at this time. I encouraged her to eat food off of her hospital delivered trays. Pt would definitely benefit from outpatient counseling/classes with [facility]. SUBJECTIVE/OBJECTIVE:   \"I have no appetite, it's because of the pain\"   Diet Order: Consistent carb  % Eaten:  No data found. Pertinent Medications:unasyn, lantus, humalog; Lucy@[employer]). Chemistries:  Lab Results   Component Value Date/Time    Sodium 137 05/05/2018 03:41 AM    Potassium 4.1 05/05/2018 03:41 AM    Chloride 104 05/05/2018 03:41 AM    CO2 21 05/05/2018 03:41 AM    Anion gap 12 05/05/2018 03:41 AM    Glucose 340 (H) 05/05/2018 03:41 AM    BUN 10 05/05/2018 03:41 AM    Creatinine 0.87 05/05/2018 03:41 AM    BUN/Creatinine ratio 11 (L) 05/05/2018 03:41 AM    GFR est AA >60 05/05/2018 03:41 AM    GFR est non-AA >60 05/05/2018 03:41 AM    Calcium 8.1 (L) 05/05/2018 03:41 AM    AST (SGOT) 23 05/04/2018 12:38 PM    Alk.  phosphatase 160 (H) 05/04/2018 12:38 PM    Protein, total 6.1 (L) 05/04/2018 12:38 PM    Albumin 2.3 (L) 05/04/2018 12:38 PM    Globulin 3.8 05/04/2018 12:38 PM    A-G Ratio 0.6 (L) 05/04/2018 12:38 PM    ALT (SGPT) 28 05/04/2018 12:38 PM      Anthropometrics: Height: 5' 6\" (167.6 cm) Weight: 72.8 kg (160 lb 7.9 oz)  [x]bed scale/standing (5/4)    []stated   []unknown    IBW (%IBW):   ( ) UBW (%UBW): 76.7 kg (169 lb) (per pt 'a while ago) (94.97 %)    BMI: Body mass index is 25.9 kg/(m^2). This BMI is indicative of:  []Underweight   []Normal   [x]Overweight   [] Obesity   [] Extreme Obesity (BMI>40)  Estimated Nutrition Needs (Based on): 1955 Kcals/day (MSJ 1504 x 1.3) , 58 g (0.8gPro/kg) Protein  Carbohydrate: At Least 130 g/day  Fluids: 2000 mL/day    Last BM: PTA   []Active     []Hyperactive  []Hypoactive       [] Absent   BS  Skin:    [] Intact   [x] Incision  [] Breakdown   [] DTI   [] Tears/Excoriation/Abrasion  []Edema [] Other: Wt Readings from Last 30 Encounters:   05/05/18 72.8 kg (160 lb 7.9 oz)   12/11/17 69.2 kg (152 lb 8.9 oz)   11/28/17 71.3 kg (157 lb 3 oz)   10/24/17 76 kg (167 lb 8.8 oz)      NUTRITION DIAGNOSES:   Problem:  Altered nutrition-related lab values      Etiology: related to hx of DM and likely diet non-compliance      Signs/Symptoms: as evidenced by -345 and hga1c 11.7. NUTRITION INTERVENTIONS:  Meals/Snacks: General/healthful diet                  GOAL:   Pt will consume >50% of meals with BG <160mg/dL in 3-5 days.      Cultural, Yarsani, or Ethnic Dietary Needs: None     LEARNING NEEDS (Diet, Food/Nutrient-Drug Interaction):    [] None Identified   [x] Identified and Education Provided/Documented   [] Identified and Pt declined/was not appropriate      [x] Interdisciplinary Care Plan Reviewed/Documented    [x] Participated in Discharge Planning:  Diabetic diet    [] Interdisciplinary Rounds     NUTRITION RISK:    [] High              [x] Moderate           [x]  Low  []  Minimal/Uncompromised    PT SEEN FOR:    []  MD Consult: []Calorie Count []Diabetic Diet Education        []Diet Education     []Electrolyte Management     []General Nutrition Management and Supplements     []Management of Tube Feeding     []TPN Recommendations    [x]  RN Referral:  [x]MST score >=2     []Enteral/Parenteral Nutrition PTA     []Pregnant: Gestational DM or Multigestation   []  Low BMI  []  Re-Screen   []  LOS   []  NPO/clears x 5 days   []  New TF/TPN    Franne Sandifer, RD, [address]   Pager [phone]  Weekend Pager [phone]

## 2018-05-05 NOTE — PROGRESS NOTES
Hospitalist Progress Note    NAME: Erasmo Lucia   :  1995   MRN:  [id number]       Assessment / Plan:  L labia and medial R buttock abscess  Lactic acidosis  -CT a/p showed small fluid collections in the L labia and medial R buttock  -obtain Bcx  -empiric abx with unasyn  -pain control with norco increase to 7.5  prn, tylenol prn  -consulting gen surg for I&D. Wound cx ordered  -repeat lactate  -gentle IVF (recieved fluid bolus in the ER)     T1DM  -last A1c in 2017 was 11, will repeat  -cont' home lantus 42 units qhs, SSI  Urged to eat      Anxiety  -appears stable.         Code Status: Full  Surrogate Decision Maker: pt's mom  DVT Prophylaxis: scds  GI Prophylaxis: not indicated  Baseline: independent       Body mass index is 25.9 kg/(m^2). Subjective:     Chief Complaint / Reason for Physician Visit  Im not eating coz of the pain  Discussed with RN events overnight. Review of Systems:  Symptom Y/N Comments  Symptom Y/N Comments   Fever/Chills n   Chest Pain n    Poor Appetite n   Edema n    Cough n   Abdominal Pain n    Sputum n   Joint Pain     SOB/CHOE n   Pruritis/Rash     Nausea/vomit n   Tolerating PT/OT     Diarrhea n   Tolerating Diet n Poor po due t pain   Constipation    Other y  buttock pain     Could NOT obtain due to:      Objective:     VITALS:   Last 24hrs VS reviewed since prior progress note.  Most recent are:  Patient Vitals for the past 24 hrs:   Temp Pulse Resp BP SpO2   18 0730 98.4 °F (36.9 °C) (!) 105 16 134/80 98 %   18 0303 98.4 °F (36.9 °C) (!) 125 18 146/82 99 %   18 2351 98.5 °F (36.9 °C) (!) 112 18 140/78 99 %   18 1932 98.1 °F (36.7 °C) (!) 124 16 143/89 99 %   18 1702 99 °F (37.2 °C) (!) 115 16 133/81 97 %   18 1043 98.5 °F (36.9 °C) (!) 133 18 141/89 100 %       Intake/Output Summary (Last 24 hours) at 18 1011  Last data filed at 18 0326   Gross per 24 hour   Intake                0 ml   Output              700 ml Net             -700 ml        PHYSICAL EXAM:  General: WD, WN. Alert, cooperative, no acute distress    EENT:  EOMI. Anicteric sclerae. MMM  Resp:  CTA bilaterally, no wheezing or rales. No accessory muscle use  CV:  Regular  rhythm,  No edema  GI:  Soft, Non distended, Non tender.  +Bowel sounds  Neurologic:  Alert and oriented X 3, normal speech,   Psych:   Good insight. Not anxious nor agitated  Skin:  No rashes. No jaundice    Reviewed most current lab test results and cultures  YES  Reviewed most current radiology test results   YES  Review and summation of old records today    NO  Reviewed patient's current orders and MAR    YES  PMH/SH reviewed - no change compared to H&P  ________________________________________________________________________  Care Plan discussed with:    Comments   Patient y    Family      RN y    Care Manager     Consultant  y surgeon                     Multidiciplinary team rounds were held today with , nursing, pharmacist and clinical coordinator. Patient's plan of care was discussed; medications were reviewed and discharge planning was addressed. ________________________________________________________________________  Total NON critical care TIME:  30  Minutes    Total CRITICAL CARE TIME Spent:   Minutes non procedure based      Comments   >50% of visit spent in counseling and coordination of care yes Reviewed chart   ________________________________________________________________________  Iwona Singh MD     Procedures: see electronic medical records for all procedures/Xrays and details which were not copied into this note but were reviewed prior to creation of Plan. LABS:  I reviewed today's most current labs and imaging studies.   Pertinent labs include:  Recent Labs      05/05/18   0341  05/04/18   1133   WBC  9.5  9.5   HGB  9.7*  11.7   HCT  30.9*  35.5   PLT  322  318     Recent Labs      05/05/18   0341  05/04/18   1238   NA  137  138   K  4.1 4.9   CL  104  107   CO2  21  24   GLU  340*  153*   BUN  10  18   CREA  0.87  0.64   CA  8.1*  8.2*   MG   --   1.9   ALB   --   2.3*   TBILI   --   0.2   SGOT   --   23   ALT   --   28       Signed: Vielka Lerma MD

## 2018-05-05 NOTE — PROGRESS NOTES
Pharmacy Automatic Renal Dosing Protocol - Antimicrobials    Indication for Antimicrobials: Right buttocks abscess    Current Regimen of Each Antimicrobial:  Unasyn  3 g IV Q8H (Start Date ; Day # 2)    Previous Antimicrobial Therapy:      Goal Level:     Significant Cultures:   18 Strep, GNR - Prelim    Radiology / Imaging results: (X-ray, CT scan or MRI):     Paralysis, amputations, malnutrition: NA    Labs:  Recent Labs      18   0341  18   1238  18   1133   CREA  0.87  0.64   --    BUN  10  18   --    WBC  9.5   --   9.5     Temp (24hrs), Av.4 °F (36.9 °C), Min:98.1 °F (36.7 °C), Max:99 °F (37.2 °C)    Creatinine Clearance (mL/min) or Dialysis: >100    Impression/Plan:   · Unasyn dose is appropriate. · Antimicrobial stop date 7 day stop date in place     Pharmacy will follow daily and adjust medications as appropriate for renal function and/or serum levels. Thank you,  Bonnie Watson, PHARMD    Recommended duration of therapy  [email or web address]/[place]/[facility]/Pharmacy/Clinical%20Companion/Duration%20of%20ABX%20therapy. docx    Renal Dosing  [email or web address]/[place]/[facility]/Pharmacy/Clinical%20Companion/Renal%[email or web address]. pdf

## 2018-05-05 NOTE — PROGRESS NOTES
Oncology Nursing Communication Tool  6:03 AM  5/5/2018     Bedside shift change report given to Rona Gabriel RN (incoming nurse) by Yelena Fried RN (outgoing nurse) on [facility]. Report included the following information SBAR, Kardex and MAR. Shift Summary:  Gave prn percocet 1X      Issues for physician to address:     Patients boyfriend brought in sugary junk food at bedside. Patient diabetic nutrition applied. More teaching need apply        Oncology Shift Note   Admission Date 5/4/2018   Admission Diagnosis Sepsis ([address]   Code Status Full Code   Consults IP CONSULT TO GENERAL SURGERY      Cardiac Monitoring [] Yes [] No      Purposeful Hourly Rounding [] Yes    Christina Score Total Score: 1   Christina score 3 or > [] Bed Alarm [] Avasys [] 1:1 sitter [] Patient refused (Place signed refusal form in chart)      Pain Managed [] Yes [] No    Key Pain Meds     The patient is on no pain meds. Influenza Vaccine Received Flu Vaccine for Current Season (usually Sept-March): Not Flu Season           Oxygen needs?  [] Room air Oxygen @  []1L    []2L    []3L   []4L    []5L   []6L     Use home O2? [] Yes [] No  Perform O2 challenge test using  smartphrase (.oxygenchallenge)      Last bowel movement    bowel movement      Urinary Catheter             LDAs               Peripheral IV 05/04/18 Right Forearm (Active)   Site Assessment Clean, dry, & intact 5/5/2018  2:18 AM   Phlebitis Assessment 0 5/5/2018  2:18 AM   Infiltration Assessment 0 5/5/2018  2:18 AM   Dressing Status Clean, dry, & intact 5/5/2018  2:18 AM   Dressing Type Tape 5/5/2018  2:18 AM   Hub Color/Line Status Pink 5/5/2018  2:18 AM       Peripheral IV 05/04/18 Left Antecubital (Active)   Site Assessment Clean, dry, & intact 5/5/2018  2:18 AM   Phlebitis Assessment 0 5/5/2018  2:18 AM   Infiltration Assessment 0 5/5/2018  2:18 AM   Dressing Status Clean, dry, & intact 5/5/2018  2:18 AM   Dressing Type Topical skin adhesive 5/5/2018  2:18 AM                         Readmission Risk Assessment Tool Score Low Risk            7       Total Score        3 Has Seen PCP in Last 6 Months (Yes=3, No=0)    4 IP Visits Last 12 Months (1-3=4, 4=9, >4=11)        Criteria that do not apply:    . Living with Significant Other. Assisted Living. LTAC. SNF. or   Rehab    Patient Length of Stay (>5 days = 3)    Pt.  Coverage (Medicare=5 , Medicaid, or Self-Pay=4)    Charlson Comorbidity Score (Age + Comorbid Conditions)       Expected Length of Stay - - -   Actual Length of Stay [facility]

## 2018-05-06 LAB
GLUCOSE BLD STRIP.AUTO-MCNC: 121 MG/DL (ref 65–100)
GLUCOSE BLD STRIP.AUTO-MCNC: 200 MG/DL (ref 65–100)
GLUCOSE BLD STRIP.AUTO-MCNC: 89 MG/DL (ref 65–100)
GLUCOSE BLD STRIP.AUTO-MCNC: 97 MG/DL (ref 65–100)
SERVICE CMNT-IMP: ABNORMAL
SERVICE CMNT-IMP: ABNORMAL
SERVICE CMNT-IMP: NORMAL
SERVICE CMNT-IMP: NORMAL

## 2018-05-06 PROCEDURE — 74011636637 HC RX REV CODE- 636/637: Performed by: INTERNAL MEDICINE

## 2018-05-06 PROCEDURE — 74011250637 HC RX REV CODE- 250/637: Performed by: INTERNAL MEDICINE

## 2018-05-06 PROCEDURE — 74011250636 HC RX REV CODE- 250/636: Performed by: SURGERY

## 2018-05-06 PROCEDURE — 74011000258 HC RX REV CODE- 258: Performed by: INTERNAL MEDICINE

## 2018-05-06 PROCEDURE — 82962 GLUCOSE BLOOD TEST: CPT

## 2018-05-06 PROCEDURE — 65270000015 HC RM PRIVATE ONCOLOGY

## 2018-05-06 PROCEDURE — 74011250636 HC RX REV CODE- 250/636: Performed by: INTERNAL MEDICINE

## 2018-05-06 RX ORDER — OXYCODONE AND ACETAMINOPHEN 10; 325 MG/1; MG/1
1 TABLET ORAL
Status: DISCONTINUED | OUTPATIENT
Start: 2018-05-06 | End: 2018-05-11 | Stop reason: HOSPADM

## 2018-05-06 RX ORDER — IBUPROFEN 600 MG/1
600 TABLET ORAL 2 TIMES DAILY
Status: DISCONTINUED | OUTPATIENT
Start: 2018-05-06 | End: 2018-05-11 | Stop reason: HOSPADM

## 2018-05-06 RX ORDER — ACETAMINOPHEN 500 MG
500 TABLET ORAL
Status: DISCONTINUED | OUTPATIENT
Start: 2018-05-06 | End: 2018-05-11 | Stop reason: HOSPADM

## 2018-05-06 RX ORDER — MORPHINE SULFATE 10 MG/ML
2 INJECTION, SOLUTION INTRAMUSCULAR; INTRAVENOUS
Status: DISCONTINUED | OUTPATIENT
Start: 2018-05-06 | End: 2018-05-08

## 2018-05-06 RX ADMIN — SODIUM CHLORIDE 75 ML/HR: 900 INJECTION, SOLUTION INTRAVENOUS at 07:52

## 2018-05-06 RX ADMIN — IBUPROFEN 600 MG: 600 TABLET, FILM COATED ORAL at 20:42

## 2018-05-06 RX ADMIN — MORPHINE SULFATE 2 MG: 10 INJECTION INTRAVENOUS at 22:20

## 2018-05-06 RX ADMIN — OXYCODONE HYDROCHLORIDE AND ACETAMINOPHEN 1 TABLET: 7.5; 325 TABLET ORAL at 00:57

## 2018-05-06 RX ADMIN — AMPICILLIN SODIUM AND SULBACTAM SODIUM 3 G: 2; 1 INJECTION, POWDER, FOR SOLUTION INTRAMUSCULAR; INTRAVENOUS at 16:12

## 2018-05-06 RX ADMIN — OXYCODONE HYDROCHLORIDE AND ACETAMINOPHEN 1 TABLET: 10; 325 TABLET ORAL at 22:20

## 2018-05-06 RX ADMIN — OXYCODONE HYDROCHLORIDE AND ACETAMINOPHEN 1 TABLET: 7.5; 325 TABLET ORAL at 16:08

## 2018-05-06 RX ADMIN — INSULIN GLARGINE 42 UNITS: 100 INJECTION, SOLUTION SUBCUTANEOUS at 21:04

## 2018-05-06 RX ADMIN — SODIUM CHLORIDE 75 ML/HR: 900 INJECTION, SOLUTION INTRAVENOUS at 20:42

## 2018-05-06 RX ADMIN — OXYCODONE HYDROCHLORIDE AND ACETAMINOPHEN 1 TABLET: 7.5; 325 TABLET ORAL at 07:44

## 2018-05-06 RX ADMIN — AMPICILLIN SODIUM AND SULBACTAM SODIUM 3 G: 2; 1 INJECTION, POWDER, FOR SOLUTION INTRAMUSCULAR; INTRAVENOUS at 00:55

## 2018-05-06 RX ADMIN — AMPICILLIN SODIUM AND SULBACTAM SODIUM 3 G: 2; 1 INJECTION, POWDER, FOR SOLUTION INTRAMUSCULAR; INTRAVENOUS at 08:14

## 2018-05-06 NOTE — PROGRESS NOTES
Hospitalist Progress Note    NAME: Griffin Feliciano   :  1995   MRN:  [id number]       Assessment / Plan:  L labia and medial R buttock abscess  PAIN  Lactic acidosis  -CT a/p showed small fluid collections in the L labia and medial R buttock  Culture multiple org: Strep , ecoli and yeast  -empiric abx with unasyn  -pain control with norco increase to 7.5  prn, tylenol prn, increase to 10 mg add Motrin  -consulting gen surg for I&D. Wound cx ordered  -repeat lactate  -gentle IVF (recieved fluid bolus in the ER)     T1DM  -last A1c in 2017 was 11, will repeat  -cont' home lantus 42 units qhs, SSI  Urged to eat      Anxiety  -appears stable.         Code Status: Full  Surrogate Decision Maker: pt's mom  DVT Prophylaxis: scds  GI Prophylaxis: not indicated  Baseline: independent       Body mass index is 25.9 kg/(m^2). Subjective:     Chief Complaint / Reason for Physician Visit    Im not eating coz of the pain  Discussed with RN events overnight. Review of Systems:  Symptom Y/N Comments  Symptom Y/N Comments   Fever/Chills n   Chest Pain n    Poor Appetite n   Edema n    Cough n   Abdominal Pain n    Sputum n   Joint Pain     SOB/CHOE n   Pruritis/Rash     Nausea/vomit n   Tolerating PT/OT     Diarrhea n   Tolerating Diet n Poor po due t pain   Constipation    Other y  buttock pain     Could NOT obtain due to:      Objective:     VITALS:   Last 24hrs VS reviewed since prior progress note.  Most recent are:  Patient Vitals for the past 24 hrs:   Temp Pulse Resp BP SpO2   18 1110 98.8 °F (37.1 °C) (!) 108 18 145/86 97 %   18 0800 98.5 °F (36.9 °C) (!) 103 17 (!) 142/91 98 %   18 0340 98 °F (36.7 °C) 97 16 (!) 134/96 98 %   18 2345 98.5 °F (36.9 °C) 96 16 (!) 138/94 99 %   18 1911 98.6 °F (37 °C) (!) 107 16 (!) 138/92 96 %   18 1602 98.2 °F (36.8 °C) (!) 109 16 144/83 98 %     No intake or output data in the 24 hours ending 18 1410     PHYSICAL EXAM:  General: WD, WN. Alert, cooperative, no acute distress    EENT:  EOMI. Anicteric sclerae. MMM  Resp:  CTA bilaterally, no wheezing or rales. No accessory muscle use  CV:  Regular  rhythm,  No edema  GI:  Soft, Non distended, Non tender.  +Bowel sounds  Neurologic:  Alert and oriented X 3, normal speech,   Psych:   Good insight. Not anxious nor agitated  Skin:  No rashes. No jaundice    Reviewed most current lab test results and cultures  YES  Reviewed most current radiology test results   YES  Review and summation of old records today    NO  Reviewed patient's current orders and MAR    YES  PMH/SH reviewed - no change compared to H&P  ________________________________________________________________________  Care Plan discussed with:    Comments   Patient y    Family      RN y    Care Manager     Consultant  y surgeon                     Multidiciplinary team rounds were held today with , nursing, pharmacist and clinical coordinator. Patient's plan of care was discussed; medications were reviewed and discharge planning was addressed. ________________________________________________________________________  Total NON critical care TIME:  30  Minutes    Total CRITICAL CARE TIME Spent:   Minutes non procedure based      Comments   >50% of visit spent in counseling and coordination of care yes Reviewed chart   ________________________________________________________________________  Zeus Hernandez MD     Procedures: see electronic medical records for all procedures/Xrays and details which were not copied into this note but were reviewed prior to creation of Plan. LABS:  I reviewed today's most current labs and imaging studies.   Pertinent labs include:  Recent Labs      05/05/18   0341  05/04/18   1133   WBC  9.5  9.5   HGB  9.7*  11.7   HCT  30.9*  35.5   PLT  322  318     Recent Labs      05/05/18 0341  05/04/18   1238   NA  137  138   K  4.1  4.9   CL  104  107   CO2  21  24   GLU 340*  153*   BUN  10  18   CREA  0.87  0.64   CA  8.1*  8.2*   MG   --   1.9   ALB   --   2.3*   TBILI   --   0.2   SGOT   --   23   ALT   --   28       Signed: Anila Green MD

## 2018-05-06 NOTE — PROGRESS NOTES
Admit Date: 2018    POD * No surgery found *    Procedure:  * No surgery found *    Subjective:     Patient has complaints of some perineal wounds as well as the buttocks wound. Objective:     Blood pressure (!) 142/91, pulse (!) 103, temperature 98.5 °F (36.9 °C), resp. rate 17, height 5' 6\" (1.676 m), weight 160 lb 7.9 oz (72.8 kg), last menstrual period 2018, SpO2 98 %, not currently breastfeeding. Temp (24hrs), Av.3 °F (36.8 °C), Min:98 °F (36.7 °C), Max:98.6 °F (37 °C)      Physical Exam:  GENERAL: alert, cooperative, no distress, appears stated age, LUNG: clear to auscultation bilaterally, HEART: regular rate and rhythm, ABDOMEN: soft, non-tender.  Bowel sounds normal. No masses,  no organomegaly, EXTREMITIES:  extremities normal, atraumatic, no cyanosis or edema, SKIN:  Buttocks wound clean, no drainage with dressing change, Perineum demonstrates some chronic hydradenitis changes R>L , nothing warranting drainage    Labs:   Recent Results (from the past 24 hour(s))   GLUCOSE, POC    Collection Time: 18 11:29 AM   Result Value Ref Range    Glucose (POC) 88 65 - 100 mg/dL    Performed by Bijal Freeman    GLUCOSE, POC    Collection Time: 18  4:09 PM   Result Value Ref Range    Glucose (POC) 57 (L) 65 - 100 mg/dL    Performed by [place] (PCT)    GLUCOSE, POC    Collection Time: 18  4:47 PM   Result Value Ref Range    Glucose (POC) 143 (H) 65 - 100 mg/dL    Performed by [place] (PCT)    GLUCOSE, POC    Collection Time: 18  9:08 PM   Result Value Ref Range    Glucose (POC) 131 (H) 65 - 100 mg/dL    Performed by Carmen Jaquez, POC    Collection Time: 18  7:38 AM   Result Value Ref Range    Glucose (POC) 97 65 - 100 mg/dL    Performed by Guillaume Lemons (PCT)        Data Review images and reports reviewed    Assessment:     Active Problems:    Sepsis ([address] (2018)        Plan/Recommendations/Medical Decision Making:     Continue present treatment Cultures with gram negative rods, possible strep species - follow for antibiotic adjustment  Morphine added bid prn for use during dressing changes - still very painful for pt. Jacqui Ann.  Petra Velez MD, [facility]

## 2018-05-06 NOTE — PROGRESS NOTES
Oncology Nursing Communication Tool  7:03 PM  5/6/2018     Bedside shift change report given to Willam Bello RN (incoming nurse) by Taras Mcintyre RN (outgoing nurse) on [facility]baum. Report included the following information SBAR. Shift Summary:       Issues for physician to address: Oncology Shift Note   Admission Date 5/4/2018   Admission Diagnosis Sepsis ([address]   Code Status Full Code   Consults IP CONSULT TO GENERAL SURGERY      Cardiac Monitoring [x] Yes [] No      Purposeful Hourly Rounding [x] Yes    Christina Score Total Score: 1   Christina score 3 or > [] Bed Alarm [] Avasys [] 1:1 sitter [] Patient refused (Place signed refusal form in chart)      Pain Managed [x] Yes [] No    Key Pain Meds     The patient is on no pain meds. Influenza Vaccine Received Flu Vaccine for Current Season (usually Sept-March): Not Flu Season           Oxygen needs?  [x] Room air Oxygen @  []1L    []2L    []3L   []4L    []5L   []6L     Use home O2? [] Yes [] No  Perform O2 challenge test using  smartphrase (.oxygenchallenge)      Last bowel movement    bowel movement      Urinary Catheter             LDAs               Peripheral IV 05/04/18 Right Forearm (Active)   Site Assessment Clean, dry, & intact 5/6/2018  8:01 AM   Phlebitis Assessment 0 5/6/2018  8:01 AM   Infiltration Assessment 0 5/6/2018  2:00 AM   Dressing Status Clean, dry, & intact 5/6/2018  8:01 AM   Dressing Type Tape 5/6/2018  2:00 AM   Hub Color/Line Status Pink 5/6/2018  2:00 AM   Alcohol Cap Used Yes 5/6/2018  8:01 AM       Peripheral IV 05/04/18 Left Antecubital (Active)   Site Assessment Clean, dry, & intact 5/6/2018  2:00 AM   Phlebitis Assessment 0 5/6/2018  2:00 AM   Infiltration Assessment 0 5/6/2018  2:00 AM   Dressing Status Clean, dry, & intact 5/6/2018  2:00 AM   Dressing Type Tape 5/6/2018  2:00 AM   Alcohol Cap Used Yes 5/5/2018 10:20 AM                         Readmission Risk Assessment Tool Score Low Risk            7 Total Score        3 Has Seen PCP in Last 6 Months (Yes=3, No=0)    4 IP Visits Last 12 Months (1-3=4, 4=9, >4=11)        Criteria that do not apply:    . Living with Significant Other. Assisted Living. LTAC. SNF. or   Rehab    Patient Length of Stay (>5 days = 3)    Pt.  Coverage (Medicare=5 , Medicaid, or Self-Pay=4)    Charlson Comorbidity Score (Age + Comorbid Conditions)       Expected Length of Stay - - -   Actual Length of Stay 2          Aleena Gregg RN

## 2018-05-06 NOTE — PROGRESS NOTES
While changing wound care, patient expressed size of abscess on labia has increased and two more have appeared with increased pain during her stay here.

## 2018-05-06 NOTE — PROGRESS NOTES
Oncology Nursing Communication Tool  7:03 AM  5/6/2018     Bedside shift change report given to Rodolfo Clay RN (incoming nurse) by Kely Lentz RN (outgoing nurse) on [facility]. Report included the following information SBAR, Kardex and MAR. Shift Summary:         Issues for physician to address:    Patients abcess on labia appear to be getting larger and more painful per pt. Possible for IV pain medication during wound care? Oncology Shift Note   Admission Date 5/4/2018   Admission Diagnosis Sepsis ([address]   Code Status Full Code   Consults IP CONSULT TO GENERAL SURGERY      Cardiac Monitoring [] Yes [] No      Purposeful Hourly Rounding [] Yes    Christina Score Total Score: 1   Christina score 3 or > [] Bed Alarm [] Avasys [] 1:1 sitter [] Patient refused (Place signed refusal form in chart)      Pain Managed [] Yes [] No    Key Pain Meds     The patient is on no pain meds. Influenza Vaccine Received Flu Vaccine for Current Season (usually Sept-March): Not Flu Season           Oxygen needs?  [] Room air Oxygen @  []1L    []2L    []3L   []4L    []5L   []6L     Use home O2? [] Yes [] No  Perform O2 challenge test using  smartphrase (.oxygenchallenge)      Last bowel movement    bowel movement      Urinary Catheter             LDAs               Peripheral IV 05/04/18 Right Forearm (Active)   Site Assessment Clean, dry, & intact 5/6/2018  2:00 AM   Phlebitis Assessment 0 5/6/2018  2:00 AM   Infiltration Assessment 0 5/6/2018  2:00 AM   Dressing Status Clean, dry, & intact 5/6/2018  2:00 AM   Dressing Type Tape 5/6/2018  2:00 AM   Hub Color/Line Status Pink 5/6/2018  2:00 AM   Alcohol Cap Used Yes 5/5/2018 10:20 AM       Peripheral IV 05/04/18 Left Antecubital (Active)   Site Assessment Clean, dry, & intact 5/6/2018  2:00 AM   Phlebitis Assessment 0 5/6/2018  2:00 AM   Infiltration Assessment 0 5/6/2018  2:00 AM   Dressing Status Clean, dry, & intact 5/6/2018  2:00 AM   Dressing Type Tape 5/6/2018  2:00 AM   Alcohol Cap Used Yes 5/5/2018 10:20 AM                         Readmission Risk Assessment Tool Score Low Risk            7       Total Score        3 Has Seen PCP in Last 6 Months (Yes=3, No=0)    4 IP Visits Last 12 Months (1-3=4, 4=9, >4=11)        Criteria that do not apply:    . Living with Significant Other. Assisted Living. LTAC. SNF. or   Rehab    Patient Length of Stay (>5 days = 3)    Pt.  Coverage (Medicare=5 , Medicaid, or Self-Pay=4)    Charlson Comorbidity Score (Age + Comorbid Conditions)       Expected Length of Stay - - -   Actual Length of Stay 2          Leif Castaneda

## 2018-05-07 LAB
BACTERIA SPEC CULT: ABNORMAL
GLUCOSE BLD STRIP.AUTO-MCNC: 106 MG/DL (ref 65–100)
GLUCOSE BLD STRIP.AUTO-MCNC: 192 MG/DL (ref 65–100)
GLUCOSE BLD STRIP.AUTO-MCNC: 259 MG/DL (ref 65–100)
GLUCOSE BLD STRIP.AUTO-MCNC: 66 MG/DL (ref 65–100)
GLUCOSE BLD STRIP.AUTO-MCNC: 79 MG/DL (ref 65–100)
GLUCOSE BLD STRIP.AUTO-MCNC: 80 MG/DL (ref 65–100)
GRAM STN SPEC: ABNORMAL
SERVICE CMNT-IMP: ABNORMAL
SERVICE CMNT-IMP: NORMAL

## 2018-05-07 PROCEDURE — 74011000258 HC RX REV CODE- 258: Performed by: INTERNAL MEDICINE

## 2018-05-07 PROCEDURE — 65270000015 HC RM PRIVATE ONCOLOGY

## 2018-05-07 PROCEDURE — 74011250636 HC RX REV CODE- 250/636: Performed by: SURGERY

## 2018-05-07 PROCEDURE — 74011250637 HC RX REV CODE- 250/637: Performed by: INTERNAL MEDICINE

## 2018-05-07 PROCEDURE — 74011250636 HC RX REV CODE- 250/636: Performed by: INTERNAL MEDICINE

## 2018-05-07 PROCEDURE — 82962 GLUCOSE BLOOD TEST: CPT

## 2018-05-07 PROCEDURE — 74011636637 HC RX REV CODE- 636/637: Performed by: INTERNAL MEDICINE

## 2018-05-07 RX ORDER — DIPHENHYDRAMINE HCL 25 MG
50 CAPSULE ORAL
Status: DISCONTINUED | OUTPATIENT
Start: 2018-05-07 | End: 2018-05-11 | Stop reason: HOSPADM

## 2018-05-07 RX ADMIN — DIPHENHYDRAMINE HYDROCHLORIDE 50 MG: 25 CAPSULE ORAL at 17:07

## 2018-05-07 RX ADMIN — AMPICILLIN SODIUM AND SULBACTAM SODIUM 3 G: 2; 1 INJECTION, POWDER, FOR SOLUTION INTRAMUSCULAR; INTRAVENOUS at 17:07

## 2018-05-07 RX ADMIN — AMPICILLIN SODIUM AND SULBACTAM SODIUM 3 G: 2; 1 INJECTION, POWDER, FOR SOLUTION INTRAMUSCULAR; INTRAVENOUS at 01:19

## 2018-05-07 RX ADMIN — OXYCODONE HYDROCHLORIDE AND ACETAMINOPHEN 1 TABLET: 10; 325 TABLET ORAL at 08:07

## 2018-05-07 RX ADMIN — OXYCODONE HYDROCHLORIDE AND ACETAMINOPHEN 1 TABLET: 10; 325 TABLET ORAL at 20:03

## 2018-05-07 RX ADMIN — OXYCODONE HYDROCHLORIDE AND ACETAMINOPHEN 1 TABLET: 10; 325 TABLET ORAL at 14:10

## 2018-05-07 RX ADMIN — AMPICILLIN SODIUM AND SULBACTAM SODIUM 3 G: 2; 1 INJECTION, POWDER, FOR SOLUTION INTRAMUSCULAR; INTRAVENOUS at 09:43

## 2018-05-07 RX ADMIN — IBUPROFEN 600 MG: 600 TABLET, FILM COATED ORAL at 08:07

## 2018-05-07 RX ADMIN — AMPICILLIN SODIUM AND SULBACTAM SODIUM 3 G: 2; 1 INJECTION, POWDER, FOR SOLUTION INTRAMUSCULAR; INTRAVENOUS at 23:12

## 2018-05-07 RX ADMIN — MORPHINE SULFATE 2 MG: 10 INJECTION INTRAVENOUS at 17:52

## 2018-05-07 RX ADMIN — INSULIN LISPRO 8 UNITS: 100 INJECTION, SOLUTION INTRAVENOUS; SUBCUTANEOUS at 21:10

## 2018-05-07 RX ADMIN — IBUPROFEN 600 MG: 600 TABLET, FILM COATED ORAL at 17:07

## 2018-05-07 RX ADMIN — Medication 10 ML: at 14:11

## 2018-05-07 RX ADMIN — Medication 10 ML: at 23:12

## 2018-05-07 RX ADMIN — SODIUM CHLORIDE 75 ML/HR: 900 INJECTION, SOLUTION INTRAVENOUS at 11:28

## 2018-05-07 RX ADMIN — INSULIN GLARGINE 42 UNITS: 100 INJECTION, SOLUTION SUBCUTANEOUS at 21:10

## 2018-05-07 NOTE — CDMP QUERY
Dr. Renetta Avalos MD :  After further study, do you concur with the findings of Sepsis noted in the General Surgeon's Consultation note? Other Explanation of the clinical findings  Unable to Determine (no explanation for clinical findings)    The medical record reflects the following clinical findings, risk factors and treatment:     Risk Factors:  25 yo female admitted w/ L Labial/ R buttock abscess  Clinical Indicators:  HR: 115; 133. Carlynn Britni Carlynn Britni Lac acid: 2.9. Carlynn Britni Carlynn Britni CT ABD/PELV: 1. No perirectal abscess identified. 2. Small fluid collections in the left labia and medial right buttock. Treatment: IVF NS Bolus 1,000ml; 1,200ml/ IVF NS Infusion @ 50ml/hr/ Unasyn 3g IV    Please clarify and document your clinical opinion in the progress notes and discharge summary including the definitive and/or presumptive diagnosis, (suspected or probable), related to the above clinical findings. Please include clinical findings supporting your diagnosis.     Thank Donald Briggs  [phone]

## 2018-05-07 NOTE — PROGRESS NOTES
Spiritual Care Assessment/Progress Note  [facility]      NAME: Jesse Ceja      MRN: [id number]  AGE: 26 y.o. SEX: female  Roman Catholic Affiliation: Unknown   Language: English     5/7/2018     Total Time (in minutes): 10     Spiritual Assessment begun in [facility] through conversation with:         [x]Patient        [] Family    [] Friend(s)        Reason for Consult: Initial/Spiritual assessment, patient floor     Spiritual beliefs: (Please include comment if needed)     [] Identifies with a traci tradition:     [] Supported by a traci community:      [] Claims no spiritual orientation:      [] Seeking spiritual identity:           [] Adheres to an individual form of spirituality:      [x] Not able to assess:                     Identified resources for coping:      [] Prayer                               [] Music                  [] Guided Imagery     [] Family/friends                 [] Pet visits     [] Devotional reading                         [x] Unknown     [] Other:                                              Interventions offered during this visit: (See comments for more details)    Patient Interventions: Initial/Spiritual assessment, patient floor           Plan of Care:     [] Support spiritual and/or cultural needs    [] Support AMD and/or advance care planning process      [] Support grieving process   [] Coordinate Rites and/or Rituals    [] Coordination with community clergy   [] No spiritual needs identified at this time   [] Detailed Plan of Care below (See Comments)  [] Make referral to Music Therapy  [] Make referral to Pet Therapy     [] Make referral to Addiction services  [] Make referral to [facility]  [] Make referral to Spiritual Care Partner  [] No future visits requested        [x] Follow up visits as needed     Comments:  Initial visit on Oncology for spiritual assessment. Patient declined visit at this time saying she is okay.   Advised her of  availability.   THANH Achayra, [facility], [address]    [address] Paging Service  [phone])

## 2018-05-07 NOTE — PROGRESS NOTES
Oncology Interdisciplinary rounds were held today to discuss patient plan of care and outcomes. The following members were present: Nursing, Case Management, Pharmacy and Dietary.     Actual Length of Stay: 3         Expected Length of Stay: - - -                Plan for Day        Mobility        Plan for Stay      Plan for Way   Wound care  Pain control Up ad hao Pain control Home5/9

## 2018-05-07 NOTE — PROGRESS NOTES
Pharmacy Automatic Renal Dosing Protocol - Antimicrobials    Indication for Antimicrobials: Right buttocks abscess    Current Regimen of Each Antimicrobial:  Unasyn  3 g IV Q8H x7 days (Start Date ; Day # 3)    Previous Antimicrobial Therapy:      Significant Cultures:   : Wound (Abscess) = Strep b-Hemo B; E.coli; Yeast x2; alph Strep; Mixed anaerobic GNR ESBL+ (FINAL)    Blood = NG x3 days (pending)    Radiology / Imaging results: (X-ray, CT scan or MRI):   : CT Abd:   1. No perirectal abscess identified. 2. Small fluid collections in the left labia and medial right buttock    Paralysis, amputations, malnutrition: NA    Labs:  Recent Labs      18   0341   CREA  0.87   BUN  10   WBC  9.5     Temp (24hrs), Av.6 °F (37 °C), Min:98.1 °F (36.7 °C), Max:99.1 °F (37.3 °C)    Creatinine Clearance (mL/min) or Dialysis: >100    Impression/Plan:   · Change Unasyn to 3gm IV q6h which is appropriate for this polymicrobial wound. · Surgery consulted  · Antimicrobial stop date 7 day stop date in place     Pharmacy will follow daily and adjust medications as appropriate for renal function and/or serum levels. Thank you,  Mary Garcia [facility]    Recommended duration of therapy  [email or web address]/Pharmacy/Clinical%20Companion/[email or web address]. docx    Renal Dosing  [email or web address]/Pharmacy/[email or web address]/Renal%[email or web address]. pdf

## 2018-05-07 NOTE — PROGRESS NOTES
Initial CM Assessment     26 y.o.  female with PMHx significant for T1DM, anxiety, present to the ED c/o worsening of swelling, erythema, and pain in her R gluteal fold and L labia majora. Associated symptoms include fever and nausea and 1 episode of vomiting,  Pt states she has hx of boils/ hx of abscess in her groin area in the past, s/p drainage at [facility].    CM introduced self and role of CM - all demographic information verified and PCP added to facesheet. PCP - Dr. Saul Edgar, [place] - [phone]  Endocrinology - Dr. Darrin Briggs    Patient lives in private home w/ her mother - states mother has taken care of wound care in the past in areas that she is \"unable to reach\". Otherwise, patient states she is independent with all care, drives and works. Reason for Admission:   R gluteal fold abscess                   RRAT Score:          7             Plan for utilizing home health: Only if needed - patient independent with walking - mother at home to assist with wound care if necessary                    Likelihood of Readmission:  Low - Independent and compliant with follow-up (per patient and her mother)  Insured with [employer]                         Transition of Care Plan:          Continue IV ABX then follow-up with PCP    Care Management Interventions  PCP Verified by CM: Yes (Dr. Shakir Dickson [place], [place] - [phone] - added to facesheet)  Transition of Care Consult (CM Consult):  Other (Initial CM Assessment)  MyChart Signup: No  Discharge Durable Medical Equipment: No (Patient independent prior to admission - no current DME )  Physical Therapy Consult: No  Occupational Therapy Consult: No  Speech Therapy Consult: No  Current Support Network: Own Home, Other, Family Lives Nearby (Lives with her mother in private one story home - takes care of her own needs, including BS checks and insulin shots)  Confirm Follow Up Transport: Self (Patient works and drives herself - family available for assistance as needed)  Plan discussed with Pt/Family/Caregiver: Yes (Patient provided all interview information - mother at bedside)    Morena Restrepo, RN, BSN, ACM   - Medical Oncology  [phone]

## 2018-05-07 NOTE — PROGRESS NOTES
Oncology Nursing Communication Tool  7:22 PM  5/7/2018     Bedside and Verbal shift change report given to JONATHAN Jones (incoming nurse) by Jose Mayorga (outgoing nurse) on [facility]. Report included the following information SBAR, Kardex, Procedure Summary, Intake/Output, MAR, Accordion and Recent Results. Shift Summary:       Issues for physician to address: Oncology Shift Note   Admission Date 5/4/2018   Admission Diagnosis Sepsis ([address]   Code Status Full Code   Consults IP CONSULT TO GENERAL SURGERY      Cardiac Monitoring [] Yes [] No      Purposeful Hourly Rounding [] Yes    Christina Score Total Score: 1   Christina score 3 or > [] Bed Alarm [] Avasys [] 1:1 sitter [] Patient refused (Place signed refusal form in chart)      Pain Managed [] Yes [] No    Key Pain Meds     The patient is on no pain meds. Influenza Vaccine Received Flu Vaccine for Current Season (usually Sept-March): Not Flu Season           Oxygen needs?  [] Room air Oxygen @  []1L    []2L    []3L   []4L    []5L   []6L     Use home O2? [] Yes [] No  Perform O2 challenge test using  smartphrase (.oxygenchallenge)      Last bowel movement    bowel movement      Urinary Catheter             LDAs               Peripheral IV 05/04/18 Right Forearm (Active)   Site Assessment Clean, dry, & intact 5/7/2018  3:45 PM   Phlebitis Assessment 0 5/7/2018  3:45 PM   Infiltration Assessment 0 5/7/2018  3:45 PM   Dressing Status Clean, dry, & intact 5/7/2018  3:45 PM   Dressing Type Transparent;Tape 5/7/2018  3:45 PM   Hub Color/Line Status Pink;Flushed;Patent 5/7/2018  3:45 PM   Alcohol Cap Used Yes 5/7/2018  8:07 AM       Peripheral IV 05/04/18 Left Antecubital (Active)   Site Assessment Clean, dry, & intact 5/7/2018  3:45 PM   Phlebitis Assessment 0 5/7/2018  3:45 PM   Infiltration Assessment 0 5/7/2018  3:45 PM   Dressing Status Clean, dry, & intact 5/7/2018  3:45 PM   Dressing Type Transparent;Tape 5/7/2018  3:45 PM   Hub Color/Line Status Pink;Flushed;Patent 5/7/2018  3:45 PM   Alcohol Cap Used Yes 5/7/2018  8:07 AM                         Readmission Risk Assessment Tool Score Low Risk            7       Total Score        3 Has Seen PCP in Last 6 Months (Yes=3, No=0)    4 IP Visits Last 12 Months (1-3=4, 4=9, >4=11)        Criteria that do not apply:    . Living with Significant Other. Assisted Living. LTAC. SNF. or   Rehab    Patient Length of Stay (>5 days = 3)    Pt.  Coverage (Medicare=5 , Medicaid, or Self-Pay=4)    Charlson Comorbidity Score (Age + Comorbid Conditions)       Expected Length of Stay 3d 21h   Actual Length of Stay [address]

## 2018-05-07 NOTE — PROGRESS NOTES
Oncology Nursing Communication Tool  6:53 AM  5/7/2018     Bedside shift change report given to Elyse Temple RN (incoming nurse) by Nicky Cifuentes (outgoing nurse) on [facility]. Report included the following information SBAR, Kardex, Intake/Output, MAR and Recent Results. Shift Summary: wound care done at 2200. Pain meds given prior to WC. Wound care consult today. On IV abx. Issues for physician to address: pain management        Oncology Shift Note   Admission Date 5/4/2018   Admission Diagnosis Sepsis ([address]   Code Status Full Code   Consults IP CONSULT TO GENERAL SURGERY      Cardiac Monitoring [] Yes [x] No      Purposeful Hourly Rounding [x] Yes    Christina Score Total Score: 1   Christina score 3 or > [] Bed Alarm [] Avasys [] 1:1 sitter [] Patient refused (Place signed refusal form in chart)      Pain Managed [] Yes [x] No    Key Pain Meds     The patient is on no pain meds. Influenza Vaccine Received Flu Vaccine for Current Season (usually Sept-March): Not Flu Season           Oxygen needs?  [x] Room air Oxygen @  []1L    []2L    []3L   []4L    []5L   []6L     Use home O2? [] Yes [] No  Perform O2 challenge test using  smartphrase (.oxygenchallenge)      Last bowel movement    bowel movement      Urinary Catheter             LDAs               Peripheral IV 05/04/18 Right Forearm (Active)   Site Assessment Clean, dry, & intact 5/7/2018  2:49 AM   Phlebitis Assessment 0 5/7/2018  2:49 AM   Infiltration Assessment 0 5/7/2018  2:49 AM   Dressing Status Clean, dry, & intact 5/7/2018  2:49 AM   Dressing Type Tape;Transparent 5/7/2018  2:49 AM   Hub Color/Line Status Pink;Capped 5/7/2018  2:49 AM   Alcohol Cap Used Yes 5/6/2018  8:01 AM       Peripheral IV 05/04/18 Left Antecubital (Active)   Site Assessment Clean, dry, & intact 5/7/2018  2:49 AM   Phlebitis Assessment 0 5/7/2018  2:49 AM   Infiltration Assessment 0 5/7/2018  2:49 AM   Dressing Status Clean, dry, & intact 5/7/2018  2:49 AM   Dressing Type Tape;Transparent 5/7/2018  2:49 AM   Hub Color/Line Status Pink; Infusing 5/7/2018  2:49 AM   Alcohol Cap Used Yes 5/5/2018 10:20 AM                         Readmission Risk Assessment Tool Score Low Risk            7       Total Score        3 Has Seen PCP in Last 6 Months (Yes=3, No=0)    4 IP Visits Last 12 Months (1-3=4, 4=9, >4=11)        Criteria that do not apply:    . Living with Significant Other. Assisted Living. LTAC. SNF. or   Rehab    Patient Length of Stay (>5 days = 3)    Pt.  Coverage (Medicare=5 , Medicaid, or Self-Pay=4)    Charlson Comorbidity Score (Age + Comorbid Conditions)       Expected Length of Stay - - -   Actual Length of Stay [address]

## 2018-05-08 ENCOUNTER — ANESTHESIA (OUTPATIENT)
Dept: SURGERY | Age: 23
DRG: 746 | End: 2018-05-08
Payer: COMMERCIAL

## 2018-05-08 ENCOUNTER — ANESTHESIA EVENT (OUTPATIENT)
Dept: SURGERY | Age: 23
DRG: 746 | End: 2018-05-08
Payer: COMMERCIAL

## 2018-05-08 LAB
ANION GAP SERPL CALC-SCNC: 7 MMOL/L (ref 5–15)
BUN SERPL-MCNC: 10 MG/DL (ref 6–20)
BUN/CREAT SERPL: 19 (ref 12–20)
CALCIUM SERPL-MCNC: 8.4 MG/DL (ref 8.5–10.1)
CHLORIDE SERPL-SCNC: 106 MMOL/L (ref 97–108)
CO2 SERPL-SCNC: 26 MMOL/L (ref 21–32)
CREAT SERPL-MCNC: 0.53 MG/DL (ref 0.55–1.02)
GLUCOSE BLD STRIP.AUTO-MCNC: 109 MG/DL (ref 65–100)
GLUCOSE BLD STRIP.AUTO-MCNC: 121 MG/DL (ref 65–100)
GLUCOSE BLD STRIP.AUTO-MCNC: 159 MG/DL (ref 65–100)
GLUCOSE BLD STRIP.AUTO-MCNC: 54 MG/DL (ref 65–100)
GLUCOSE BLD STRIP.AUTO-MCNC: 63 MG/DL (ref 65–100)
GLUCOSE BLD STRIP.AUTO-MCNC: 78 MG/DL (ref 65–100)
GLUCOSE BLD STRIP.AUTO-MCNC: 85 MG/DL (ref 65–100)
GLUCOSE BLD STRIP.AUTO-MCNC: 97 MG/DL (ref 65–100)
GLUCOSE SERPL-MCNC: 137 MG/DL (ref 65–100)
HCG UR QL: NEGATIVE
POTASSIUM SERPL-SCNC: 4.5 MMOL/L (ref 3.5–5.1)
SERVICE CMNT-IMP: ABNORMAL
SERVICE CMNT-IMP: NORMAL
SODIUM SERPL-SCNC: 139 MMOL/L (ref 136–145)

## 2018-05-08 PROCEDURE — 76060000032 HC ANESTHESIA 0.5 TO 1 HR: Performed by: OBSTETRICS & GYNECOLOGY

## 2018-05-08 PROCEDURE — 77030011640 HC PAD GRND REM COVD -A: Performed by: OBSTETRICS & GYNECOLOGY

## 2018-05-08 PROCEDURE — 80048 BASIC METABOLIC PNL TOTAL CA: CPT | Performed by: INTERNAL MEDICINE

## 2018-05-08 PROCEDURE — 65270000015 HC RM PRIVATE ONCOLOGY

## 2018-05-08 PROCEDURE — 74011000250 HC RX REV CODE- 250: Performed by: OBSTETRICS & GYNECOLOGY

## 2018-05-08 PROCEDURE — 74011250636 HC RX REV CODE- 250/636: Performed by: INTERNAL MEDICINE

## 2018-05-08 PROCEDURE — 77030018836 HC SOL IRR NACL ICUM -A: Performed by: OBSTETRICS & GYNECOLOGY

## 2018-05-08 PROCEDURE — 76210000063 HC OR PH I REC FIRST 0.5 HR: Performed by: OBSTETRICS & GYNECOLOGY

## 2018-05-08 PROCEDURE — 77030019895 HC PCKNG STRP IODO -A: Performed by: OBSTETRICS & GYNECOLOGY

## 2018-05-08 PROCEDURE — 74011250637 HC RX REV CODE- 250/637: Performed by: INTERNAL MEDICINE

## 2018-05-08 PROCEDURE — 36415 COLL VENOUS BLD VENIPUNCTURE: CPT | Performed by: INTERNAL MEDICINE

## 2018-05-08 PROCEDURE — 74011636637 HC RX REV CODE- 636/637: Performed by: INTERNAL MEDICINE

## 2018-05-08 PROCEDURE — 74011250636 HC RX REV CODE- 250/636: Performed by: OBSTETRICS & GYNECOLOGY

## 2018-05-08 PROCEDURE — 74011250636 HC RX REV CODE- 250/636: Performed by: ANESTHESIOLOGY

## 2018-05-08 PROCEDURE — 74011250636 HC RX REV CODE- 250/636

## 2018-05-08 PROCEDURE — 76010000138 HC OR TIME 0.5 TO 1 HR: Performed by: OBSTETRICS & GYNECOLOGY

## 2018-05-08 PROCEDURE — 82962 GLUCOSE BLOOD TEST: CPT

## 2018-05-08 PROCEDURE — 77030032490 HC SLV COMPR SCD KNE COVD -B: Performed by: OBSTETRICS & GYNECOLOGY

## 2018-05-08 PROCEDURE — 87205 SMEAR GRAM STAIN: CPT | Performed by: OBSTETRICS & GYNECOLOGY

## 2018-05-08 PROCEDURE — 74011000258 HC RX REV CODE- 258: Performed by: INTERNAL MEDICINE

## 2018-05-08 PROCEDURE — 74011250636 HC RX REV CODE- 250/636: Performed by: SURGERY

## 2018-05-08 PROCEDURE — 0U9MXZZ DRAINAGE OF VULVA, EXTERNAL APPROACH: ICD-10-PCS | Performed by: OBSTETRICS & GYNECOLOGY

## 2018-05-08 PROCEDURE — 87075 CULTR BACTERIA EXCEPT BLOOD: CPT | Performed by: OBSTETRICS & GYNECOLOGY

## 2018-05-08 PROCEDURE — 87110 CHLAMYDIA CULTURE: CPT | Performed by: INTERNAL MEDICINE

## 2018-05-08 PROCEDURE — 81025 URINE PREGNANCY TEST: CPT

## 2018-05-08 RX ORDER — INSULIN GLARGINE 100 [IU]/ML
38 INJECTION, SOLUTION SUBCUTANEOUS
Status: DISCONTINUED | OUTPATIENT
Start: 2018-05-08 | End: 2018-05-11 | Stop reason: HOSPADM

## 2018-05-08 RX ORDER — PROPOFOL 10 MG/ML
INJECTION, EMULSION INTRAVENOUS
Status: DISCONTINUED | OUTPATIENT
Start: 2018-05-08 | End: 2018-05-08 | Stop reason: HOSPADM

## 2018-05-08 RX ORDER — SODIUM CHLORIDE, SODIUM LACTATE, POTASSIUM CHLORIDE, CALCIUM CHLORIDE 600; 310; 30; 20 MG/100ML; MG/100ML; MG/100ML; MG/100ML
25 INJECTION, SOLUTION INTRAVENOUS CONTINUOUS
Status: DISCONTINUED | OUTPATIENT
Start: 2018-05-08 | End: 2018-05-08 | Stop reason: HOSPADM

## 2018-05-08 RX ORDER — SODIUM CHLORIDE 0.9 % (FLUSH) 0.9 %
5-10 SYRINGE (ML) INJECTION AS NEEDED
Status: DISCONTINUED | OUTPATIENT
Start: 2018-05-08 | End: 2018-05-08 | Stop reason: HOSPADM

## 2018-05-08 RX ORDER — MORPHINE SULFATE 2 MG/ML
2 INJECTION, SOLUTION INTRAMUSCULAR; INTRAVENOUS
Status: DISCONTINUED | OUTPATIENT
Start: 2018-05-08 | End: 2018-05-08

## 2018-05-08 RX ORDER — MORPHINE SULFATE 4 MG/ML
2 INJECTION INTRAVENOUS
Status: DISCONTINUED | OUTPATIENT
Start: 2018-05-08 | End: 2018-05-11 | Stop reason: HOSPADM

## 2018-05-08 RX ORDER — ONDANSETRON 2 MG/ML
INJECTION INTRAMUSCULAR; INTRAVENOUS AS NEEDED
Status: DISCONTINUED | OUTPATIENT
Start: 2018-05-08 | End: 2018-05-08 | Stop reason: HOSPADM

## 2018-05-08 RX ORDER — MIDAZOLAM HYDROCHLORIDE 1 MG/ML
INJECTION, SOLUTION INTRAMUSCULAR; INTRAVENOUS AS NEEDED
Status: DISCONTINUED | OUTPATIENT
Start: 2018-05-08 | End: 2018-05-08 | Stop reason: HOSPADM

## 2018-05-08 RX ORDER — LIDOCAINE HYDROCHLORIDE 10 MG/ML
INJECTION INFILTRATION; PERINEURAL AS NEEDED
Status: DISCONTINUED | OUTPATIENT
Start: 2018-05-08 | End: 2018-05-08 | Stop reason: HOSPADM

## 2018-05-08 RX ORDER — SODIUM CHLORIDE 9 MG/ML
75 INJECTION, SOLUTION INTRAVENOUS CONTINUOUS
Status: DISCONTINUED | OUTPATIENT
Start: 2018-05-08 | End: 2018-05-11 | Stop reason: HOSPADM

## 2018-05-08 RX ORDER — FENTANYL CITRATE 50 UG/ML
25 INJECTION, SOLUTION INTRAMUSCULAR; INTRAVENOUS
Status: DISCONTINUED | OUTPATIENT
Start: 2018-05-08 | End: 2018-05-08 | Stop reason: HOSPADM

## 2018-05-08 RX ORDER — DIPHENHYDRAMINE HYDROCHLORIDE 50 MG/ML
12.5 INJECTION, SOLUTION INTRAMUSCULAR; INTRAVENOUS AS NEEDED
Status: DISCONTINUED | OUTPATIENT
Start: 2018-05-08 | End: 2018-05-08 | Stop reason: HOSPADM

## 2018-05-08 RX ORDER — FENTANYL CITRATE 50 UG/ML
INJECTION, SOLUTION INTRAMUSCULAR; INTRAVENOUS AS NEEDED
Status: DISCONTINUED | OUTPATIENT
Start: 2018-05-08 | End: 2018-05-08 | Stop reason: HOSPADM

## 2018-05-08 RX ORDER — SODIUM CHLORIDE 0.9 % (FLUSH) 0.9 %
5-10 SYRINGE (ML) INJECTION EVERY 8 HOURS
Status: DISCONTINUED | OUTPATIENT
Start: 2018-05-08 | End: 2018-05-08 | Stop reason: HOSPADM

## 2018-05-08 RX ORDER — LIDOCAINE HYDROCHLORIDE 10 MG/ML
0.1 INJECTION, SOLUTION EPIDURAL; INFILTRATION; INTRACAUDAL; PERINEURAL AS NEEDED
Status: DISCONTINUED | OUTPATIENT
Start: 2018-05-08 | End: 2018-05-08 | Stop reason: HOSPADM

## 2018-05-08 RX ORDER — HYDROMORPHONE HYDROCHLORIDE 2 MG/ML
0.2 INJECTION, SOLUTION INTRAMUSCULAR; INTRAVENOUS; SUBCUTANEOUS
Status: DISCONTINUED | OUTPATIENT
Start: 2018-05-08 | End: 2018-05-08 | Stop reason: HOSPADM

## 2018-05-08 RX ADMIN — AMPICILLIN SODIUM AND SULBACTAM SODIUM 3 G: 2; 1 INJECTION, POWDER, FOR SOLUTION INTRAMUSCULAR; INTRAVENOUS at 12:29

## 2018-05-08 RX ADMIN — SODIUM CHLORIDE 75 ML/HR: 900 INJECTION, SOLUTION INTRAVENOUS at 13:23

## 2018-05-08 RX ADMIN — AMPICILLIN SODIUM AND SULBACTAM SODIUM 3 G: 2; 1 INJECTION, POWDER, FOR SOLUTION INTRAMUSCULAR; INTRAVENOUS at 17:06

## 2018-05-08 RX ADMIN — AMPICILLIN SODIUM AND SULBACTAM SODIUM 3 G: 2; 1 INJECTION, POWDER, FOR SOLUTION INTRAMUSCULAR; INTRAVENOUS at 23:03

## 2018-05-08 RX ADMIN — ONDANSETRON 4 MG: 2 INJECTION INTRAMUSCULAR; INTRAVENOUS at 19:41

## 2018-05-08 RX ADMIN — AMPICILLIN SODIUM AND SULBACTAM SODIUM 3 G: 2; 1 INJECTION, POWDER, FOR SOLUTION INTRAMUSCULAR; INTRAVENOUS at 05:15

## 2018-05-08 RX ADMIN — IBUPROFEN 600 MG: 600 TABLET, FILM COATED ORAL at 09:11

## 2018-05-08 RX ADMIN — SODIUM CHLORIDE, POTASSIUM CHLORIDE, SODIUM LACTATE AND CALCIUM CHLORIDE: 600; 310; 30; 20 INJECTION, SOLUTION INTRAVENOUS at 19:00

## 2018-05-08 RX ADMIN — FENTANYL CITRATE 50 MCG: 50 INJECTION, SOLUTION INTRAMUSCULAR; INTRAVENOUS at 19:38

## 2018-05-08 RX ADMIN — OXYCODONE HYDROCHLORIDE AND ACETAMINOPHEN 1 TABLET: 10; 325 TABLET ORAL at 04:17

## 2018-05-08 RX ADMIN — INSULIN GLARGINE 38 UNITS: 100 INJECTION, SOLUTION SUBCUTANEOUS at 21:24

## 2018-05-08 RX ADMIN — OXYCODONE HYDROCHLORIDE AND ACETAMINOPHEN 1 TABLET: 10; 325 TABLET ORAL at 13:23

## 2018-05-08 RX ADMIN — MORPHINE SULFATE 2 MG: 10 INJECTION INTRAVENOUS at 12:37

## 2018-05-08 RX ADMIN — FENTANYL CITRATE 50 MCG: 50 INJECTION, SOLUTION INTRAMUSCULAR; INTRAVENOUS at 19:43

## 2018-05-08 RX ADMIN — OXYCODONE HYDROCHLORIDE AND ACETAMINOPHEN 1 TABLET: 10; 325 TABLET ORAL at 17:33

## 2018-05-08 RX ADMIN — MORPHINE SULFATE 2 MG: 4 INJECTION INTRAVENOUS at 21:24

## 2018-05-08 RX ADMIN — IBUPROFEN 600 MG: 600 TABLET, FILM COATED ORAL at 17:06

## 2018-05-08 RX ADMIN — MIDAZOLAM HYDROCHLORIDE 2 MG: 1 INJECTION, SOLUTION INTRAMUSCULAR; INTRAVENOUS at 19:32

## 2018-05-08 RX ADMIN — DIPHENHYDRAMINE HYDROCHLORIDE 50 MG: 25 CAPSULE ORAL at 18:02

## 2018-05-08 RX ADMIN — Medication 10 ML: at 21:24

## 2018-05-08 RX ADMIN — PROPOFOL 75 MCG/KG/MIN: 10 INJECTION, EMULSION INTRAVENOUS at 19:38

## 2018-05-08 RX ADMIN — Medication 10 ML: at 13:24

## 2018-05-08 NOTE — PERIOP NOTES
TRANSFER - IN REPORT:    Verbal report received from General acute hospital RN on [employer]  being received from Oncology(unit) for ordered procedure      Report consisted of patients Situation, Background, Assessment and   Recommendations(SBAR). Information from the following report(s) Kardex and MAR was reviewed with the receiving nurse. Opportunity for questions and clarification was provided. Assessment will be completed upon patients arrival to unit and care will be assumed.

## 2018-05-08 NOTE — PROGRESS NOTES
TRANSFER - OUT REPORT:    Verbal report given to April(name) on [facility]  being transferred to OR(unit) for ordered procedure       Report consisted of patients Situation, Background, Assessment and   Recommendations(SBAR). Information from the following report(s) SBAR, Kardex, Procedure Summary, Intake/Output, MAR, Accordion and Recent Results was reviewed with the receiving nurse. Lines:   Peripheral IV 05/04/18 Left Antecubital (Active)   Site Assessment Clean, dry, & intact 5/8/2018  3:16 PM   Phlebitis Assessment 0 5/8/2018  3:16 PM   Infiltration Assessment 0 5/8/2018  3:16 PM   Dressing Status Clean, dry, & intact 5/8/2018  3:16 PM   Dressing Type Transparent;Tape 5/8/2018  3:16 PM   Hub Color/Line Status Pink;Patent; Infusing 5/8/2018  3:16 PM   Alcohol Cap Used Yes 5/7/2018  8:07 AM        Opportunity for questions and clarification was provided.       Patient transported with:  consent

## 2018-05-08 NOTE — PROGRESS NOTES
Oncology Nursing Communication Tool  6:58 PM  5/8/2018     Bedside and Verbal shift change report given to Kennedy Graf RN (incoming nurse) by Everton Carrillo (outgoing nurse) on [employer]. Report included the following information SBAR, Kardex, OR Summary, Procedure Summary, Intake/Output, MAR, Accordion and Recent Results. Shift Summary:       Issues for physician to address: Oncology Shift Note   Admission Date 5/4/2018   Admission Diagnosis Sepsis ([address]  Vulva Abscess   Code Status Full Code   Consults IP CONSULT TO GENERAL SURGERY  IP CONSULT TO GENERAL SURGERY  IP CONSULT TO OB GYN      Cardiac Monitoring [] Yes [] No      Purposeful Hourly Rounding [] Yes    Christina Score Total Score: 1   Christina score 3 or > [] Bed Alarm [] Avasys [] 1:1 sitter [] Patient refused (Place signed refusal form in chart)      Pain Managed [] Yes [] No    Key Pain Meds     The patient is on no pain meds. Influenza Vaccine Received Flu Vaccine for Current Season (usually Sept-March): Not Flu Season           Oxygen needs? [] Room air Oxygen @  []1L    []2L    []3L   []4L    []5L   []6L     Use home O2? [] Yes [] No  Perform O2 challenge test using  smartphrase (.oxygenchallenge)      Last bowel movement Last Bowel Movement Date: 05/05/18  bowel movement      Urinary Catheter             LDAs               Peripheral IV 05/04/18 Left Antecubital (Active)   Site Assessment Clean, dry, & intact 5/8/2018  3:16 PM   Phlebitis Assessment 0 5/8/2018  3:16 PM   Infiltration Assessment 0 5/8/2018  3:16 PM   Dressing Status Clean, dry, & intact 5/8/2018  3:16 PM   Dressing Type Transparent;Tape 5/8/2018  3:16 PM   Hub Color/Line Status Pink;Patent; Infusing 5/8/2018  3:16 PM   Alcohol Cap Used Yes 5/7/2018  8:07 AM                         Readmission Risk Assessment Tool Score Low Risk            7       Total Score        3 Has Seen PCP in Last 6 Months (Yes=3, No=0)    4 IP Visits Last 12 Months (1-3=4, 4=9, >4=11)        Criteria that do not apply:    . Living with Significant Other. Assisted Living. LTAC. SNF. or   Rehab    Patient Length of Stay (>5 days = 3)    Pt.  Coverage (Medicare=5 , Medicaid, or Self-Pay=4)    Charlson Comorbidity Score (Age + Comorbid Conditions)       Expected Length of Stay 3d 16h   Actual Length of Stay [address] [place] [address]

## 2018-05-08 NOTE — PROGRESS NOTES
Oncology Nursing Communication Tool  7:23 AM  5/8/2018     Bedside shift change report given to Benji Faustin RN (incoming nurse) by Lorrie Nowak RN (outgoing nurse) on [facility]. Report included the following information SBAR, Kardex, MAR and Accordion. Shift Summary: Pain med x2, wound care done but not able to be packed, increased sore to right labia, general surgery to be reconsulted today       Issues for physician to address: Oncology Shift Note   Admission Date 5/4/2018   Admission Diagnosis Sepsis ([address]   Code Status Full Code   Consults IP CONSULT TO GENERAL SURGERY  IP CONSULT TO GENERAL SURGERY      Cardiac Monitoring [x] Yes [] No      Purposeful Hourly Rounding [x] Yes    Christina Score Total Score: 1   Christina score 3 or > [] Bed Alarm [] Avasys [] 1:1 sitter [] Patient refused (Place signed refusal form in chart)      Pain Managed [x] Yes [] No    Key Pain Meds     The patient is on no pain meds. Influenza Vaccine Received Flu Vaccine for Current Season (usually Sept-March): Not Flu Season           Oxygen needs? [x] Room air Oxygen @  []1L    []2L    []3L   []4L    []5L   []6L     Use home O2? [] Yes [] No  Perform O2 challenge test using  smartphrase (.oxygenchallenge)      Last bowel movement Last Bowel Movement Date: 05/05/18  bowel movement      Urinary Catheter             LDAs               Peripheral IV 05/04/18 Left Antecubital (Active)   Site Assessment Clean, dry, & intact 5/8/2018  3:46 AM   Phlebitis Assessment 0 5/8/2018  3:46 AM   Infiltration Assessment 0 5/8/2018  3:46 AM   Dressing Status Clean, dry, & intact 5/8/2018  3:46 AM   Dressing Type Transparent;Tape 5/8/2018  3:46 AM   Hub Color/Line Status Pink;Patent; Infusing 5/8/2018  3:46 AM   Alcohol Cap Used Yes 5/7/2018  8:07 AM                         Readmission Risk Assessment Tool Score Low Risk            7       Total Score        3 Has Seen PCP in Last 6 Months (Yes=3, No=0)    4 IP Visits Last 12 Months (1-3=4, 4=9, >4=11)        Criteria that do not apply:    . Living with Significant Other. Assisted Living. LTAC. SNF. or   Rehab    Patient Length of Stay (>5 days = 3)    Pt.  Coverage (Medicare=5 , Medicaid, or Self-Pay=4)    Charlson Comorbidity Score (Age + Comorbid Conditions)       Expected Length of Stay 3d 21h   Actual Length of Stay [address] [facility] [address], RN

## 2018-05-08 NOTE — PROGRESS NOTES
Hospitalist Progress Note    NAME: Lacie Jaime   :  1995   MRN:  [id number]       Assessment / Plan:  Medial R buttock abscess, S/P I& D  SIRS on admission, T 99   PAIN  Lactic acidosis  Now ORganizing area of abcess in Left Labia, fluctuant today   Beta Hemolytic Strep infection, CONTINUE IV Antibiotics  For ? 7-10 days or longer, on Unasyn  -CT a/p showed small fluid collections in the L labia and medial R buttock  Culture multiple org: Strep , ecoli and yeast  -empiric abx with unasyn  -pain controlw/ Percocet increase to 7.5  prn, tylenol prn, increase to 10 mg add Motrin  -consulting gen surg for I&D. Wound cx ordered  -repeat lactate  -gentle IVF (recieved fluid bolus in the ER)     T1DM  -last A1c in 2017 was 11, will repeat  -cont' home lantus 42 units qhs, SSI  Urged to eat      Anxiety  -appears stable.         Code Status: Full  Surrogate Decision Maker: pt's mom  DVT Prophylaxis: scds  GI Prophylaxis: not indicated  Baseline: independent       Body mass index is 25.9 kg/(m^2). Subjective:     Chief Complaint / Reason for Physician Visit    Pain is better with higher Percocet and Motrin  Im not eating coz of the pain  Discussed with RN events overnight. Review of Systems:  Symptom Y/N Comments  Symptom Y/N Comments   Fever/Chills n   Chest Pain n    Poor Appetite n   Edema n    Cough n   Abdominal Pain n    Sputum n   Joint Pain     SOB/CHOE n   Pruritis/Rash     Nausea/vomit n   Tolerating PT/OT     Diarrhea n   Tolerating Diet n Poor po due t pain   Constipation    Other y  buttock pain     Could NOT obtain due to:      Objective:     VITALS:   Last 24hrs VS reviewed since prior progress note.  Most recent are:  Patient Vitals for the past 24 hrs:   Temp Pulse Resp BP SpO2   18 1953 99 °F (37.2 °C) (!) 101 18 143/82 100 %   18 1559 98.1 °F (36.7 °C) (!) 107 18 (!) 149/95 99 %   18 1200 98.3 °F (36.8 °C) (!) 102 16 129/74 97 %   18 0730 98.1 °F (36.7 °C) 99 16 134/85 96 %   05/06/18 2337 99.1 °F (37.3 °C) (!) 103 16 (!) 141/92 97 %     No intake or output data in the 24 hours ending 05/07/18 2123     PHYSICAL EXAM:  General: WD, WN. Alert, cooperative, no acute distress    EENT:  EOMI. Anicteric sclerae. MMM  Resp:  CTA bilaterally, no wheezing or rales. No accessory muscle use  CV:  Regular  rhythm,  No edema  GI:  Soft, Non distended, Non tender.  +Bowel sounds  Neurologic:  Alert and oriented X 3, normal speech,   Psych:   Good insight. Not anxious nor agitated  Skin:  No rashes. No jaundice:  Examined  AREA with Mother in the room: There  Is induration of the LEFT Labia with Now a area of Fluctuation in the more posterior area  Which feels like organizing Abcess    Reviewed most current lab test results and cultures  YES  Reviewed most current radiology test results   YES  Review and summation of old records today    NO  Reviewed patient's current orders and MAR    YES  PMH/ reviewed - no change compared to H&P  ________________________________________________________________________  Care Plan discussed with:    Comments   Patient y    Family      RN y    Care Manager     Consultant                        Multidiciplinary team rounds were held today with , nursing, pharmacist and clinical coordinator. Patient's plan of care was discussed; medications were reviewed and discharge planning was addressed.      ________________________________________________________________________  Total NON critical care TIME:  30  Minutes    Total CRITICAL CARE TIME Spent:   Minutes non procedure based      Comments   >50% of visit spent in counseling and coordination of care yes Reviewed chart   ________________________________________________________________________  Xander Perez MD     Procedures: see electronic medical records for all procedures/Xrays and details which were not copied into this note but were reviewed prior to creation of Plan. LABS:  I reviewed today's most current labs and imaging studies.   Pertinent labs include:  Recent Labs      05/05/18 0341   WBC  9.5   HGB  9.7*   HCT  30.9*   PLT  322     Recent Labs      05/05/18 0341   NA  137   K  4.1   CL  104   CO2  21   GLU  340*   BUN  10   CREA  0.87   CA  8.1*       Signed: Iwona Singh MD

## 2018-05-08 NOTE — DIABETES MGMT
[facility]    Elevated A1C Visit Note      Recommendations/ Comments: note hypoglycemia this morning- Lantus has been decreased this evening. Current hospital DM medications: Lantus 42 units, Lispro 1 unit for every 8 grams CHO ac or with 15 minutes of start of meals    Patient is a 26 y.o. female with 12 year history of Type 1 diabetes. Mother present during visit. Patient was sleeping. Mother states that patient is \"very insulin resistant\" and when she is started on medication, it will work perfectly for a few weeks and then stop working. Reports A1C of 11.7% is not surprising at this time. States she has recently started seeing a new endocrinologist, Dr. Jayden Espinosa, and the current regimen is working well up until this hospitalization. Patient was taking Lantus 42 units daily and Aspart insulin to carb ratio of 1:8       A1c:   Lab Results   Component Value Date/Time    Hemoglobin A1c 11.7 (H) 05/04/2018 11:33 AM    Hemoglobin A1c 11.5 (H) 12/09/2017 12:15 AM       Recent Glucose Results:   Lab Results   Component Value Date/Time     (H) 05/08/2018 09:30 AM    GLUCPOC 159 (H) 05/08/2018 11:17 AM    GLUCPOC 121 (H) 05/08/2018 08:39 AM    GLUCPOC 78 05/08/2018 08:07 AM        Lab Results   Component Value Date/Time    Creatinine 0.53 (L) 05/08/2018 09:30 AM       Active Orders   Diet    DIET NPO          Provided patient with the following: [x]          Diabetes Self-Care Guide                     [x]          Outpatient DTC contact number          Patient to follow up with Dr Jayden Espinosa after discharge. Will continue to follow as needed. Thank you.     Diane Rubin, [address] [place] [address], [address]  Office:  [phone]

## 2018-05-08 NOTE — ANESTHESIA PREPROCEDURE EVALUATION
Anesthetic History   No history of anesthetic complications            Review of Systems / Medical History  Patient summary reviewed, nursing notes reviewed and pertinent labs reviewed    Pulmonary  Within defined limits                 Neuro/Psych         Psychiatric history    Comments: Anxiety Cardiovascular    Hypertension: well controlled              Exercise tolerance: >4 METS     GI/Hepatic/Renal  Within defined limits              Endo/Other    Diabetes: poorly controlled, type 1, using insulin        Comments: Admitted in DKA Other Findings   Comments: Vulvar Abscess  Admitted with Sepsis due to right buttock abscess now s/p I&D         Physical Exam    Airway  Mallampati: II  TM Distance: > 6 cm  Neck ROM: normal range of motion   Mouth opening: Normal     Cardiovascular  Regular rate and rhythm,  S1 and S2 normal,  no murmur, click, rub, or gallop             Dental  No notable dental hx       Pulmonary  Breath sounds clear to auscultation               Abdominal  GI exam deferred       Other Findings            Anesthetic Plan    ASA: 3  Anesthesia type: MAC          Induction: Intravenous  Anesthetic plan and risks discussed with: Patient

## 2018-05-08 NOTE — PROGRESS NOTES
Oncology Interdisciplinary rounds were held today to discuss patient plan of care and outcomes. The following members were present: Nursing, Case Management, Pharmacy and Dietary.     Actual Length of Stay: 4    DRG GLOS: 3.7    Expected Length of Stay: 3d 16h                Plan for Day        Mobility        Plan for Stay      Plan for Way   GYN consult  Wound cultures   Up ad hao IV antibiotics  Labs  Pain control Home 5/9

## 2018-05-08 NOTE — PERIOP NOTES
Phoned Ashley Kothari RN & updated her that that patient is scheduled to go to OR at 1900 today. Requested CHg & consents be completed as ordered. NPO until then.

## 2018-05-08 NOTE — PROGRESS NOTES
Hospitalist Progress Note    NAME: Buddy Rosas   :  1995   MRN:  [id number]       Assessment / Plan:  Medial R buttock abscess, S/P I& D  SIRS on admission, T 99   PAIN  Lactic acidosis  Now organizing area of abcess in Left Labia, fluctuant on   -CT a/p showed small fluid collections in the L labia and medial R buttock  -wound culture growing Beta Hemolytic Strep, yeast, and anaerobic gram neg korina  -Bcx negative  -will stop IVF  -cont' Unasyn. End date   -pain well controlled on Percocet prn, motrin   -consult OB for possible I&D of L labia abscess     T1DM  -hypoglycemic this morning but asymptomatic. BG was 54 this morning. Reportedly appetite has been poor.    -will decrease lantus to 38U qhs. Her home dose is 42U qhs  -last A1c in 2017 was 11.7  -SSI     Anxiety  -appears stable.         Code Status: Full  Surrogate Decision Maker: pt's mom  DVT Prophylaxis: scds  GI Prophylaxis: not indicated  Baseline: independent       Body mass index is 25.9 kg/(m^2). Subjective:     Chief Complaint / Reason for Physician Visit    Pain is better with higher Percocet and Motrin  Pt seen at bedside. Reportedly ate well overnight with food brought from outside. Afebrile. Pain well  Controlled with current regimen. Discussed with RN events overnight. Review of Systems:  Symptom Y/N Comments  Symptom Y/N Comments   Fever/Chills n   Chest Pain n    Poor Appetite n   Edema n    Cough n   Abdominal Pain n    Sputum n   Joint Pain     SOB/CHOE n   Pruritis/Rash     Nausea/vomit n   Tolerating PT/OT     Diarrhea n   Tolerating Diet n    Constipation    Other       Could NOT obtain due to:      Objective:     VITALS:   Last 24hrs VS reviewed since prior progress note.  Most recent are:  Patient Vitals for the past 24 hrs:   Temp Pulse Resp BP SpO2   18 0357 99.1 °F (37.3 °C) 99 18 127/78 98 %   18 2353 99.1 °F (37.3 °C) (!) 105 18 (!) 137/92 99 %   18 1953 99 °F (37.2 °C) (!) 101 18 143/82 100 %   05/07/18 1559 98.1 °F (36.7 °C) (!) 107 18 (!) 149/95 99 %   05/07/18 1200 98.3 °F (36.8 °C) (!) 102 16 129/74 97 %     No intake or output data in the 24 hours ending 05/08/18 0801     PHYSICAL EXAM:  General: WD, WN. Alert, cooperative, no acute distress    EENT:  EOMI. Anicteric sclerae. MMM  Resp:  CTA bilaterally, no wheezing or rales. No accessory muscle use  CV:  Regular  rhythm,  No edema  GI:  Soft, Non distended, Non tender.  +Bowel sounds  Neurologic:  Alert and oriented X 3, normal speech  Psych:   Good insight. Not anxious nor agitated  Skin:  No rashes. No jaundice:      Did not look at wound today per pt's request.                     Reviewed most current lab test results and cultures  YES  Reviewed most current radiology test results   YES  Review and summation of old records today    NO  Reviewed patient's current orders and MAR    YES  PMH/SH reviewed - no change compared to H&P  ________________________________________________________________________  Care Plan discussed with:    Comments   Patient y    Family      RN y    Care Manager     Consultant                        Multidiciplinary team rounds were held today with , nursing, pharmacist and clinical coordinator. Patient's plan of care was discussed; medications were reviewed and discharge planning was addressed. ________________________________________________________________________  Total NON critical care TIME:  35  Minutes    Total CRITICAL CARE TIME Spent:   Minutes non procedure based      Comments   >50% of visit spent in counseling and coordination of care yes Reviewed chart   ________________________________________________________________________  Mary Mae MD     Procedures: see electronic medical records for all procedures/Xrays and details which were not copied into this note but were reviewed prior to creation of Plan.       LABS:  I reviewed today's most current labs and imaging studies. Pertinent labs include:  No results for input(s): WBC, HGB, HCT, PLT, HGBEXT, HCTEXT, PLTEXT, HGBEXT, HCTEXT, PLTEXT in the last 72 hours. No results for input(s): NA, K, CL, CO2, GLU, BUN, CREA, CA, MG, PHOS, ALB, TBIL, TBILI, SGOT, ALT, INR in the last 72 hours.     No lab exists for component: [place], INREXT    Signed: Ann-Marie Monsalve MD

## 2018-05-08 NOTE — CONSULTS
Called to see pt for eval/managment of left vulvar abscess    CC:  Swelling and pain left vulva    HPI:  25 yo G0 LMP [id number] using condoms for [place] admitted for Medial R buttock abscess/SIRS s/p drainage with cx's positive for Beta Hemolytic Strep who continues on IV Unasyn. Pt with maturing left vulvar abscess. Last PO at 1100 (lunch). Had buttocks abscess drained in ED using Ativan and local.  Pt and mother decline doing the same for the vulvar abscess. Pt shaves genitals. Area in left vulva has grown since CT done on 5/5. ROS:  Denies F/C. Denies N/V. Denies constipation/diarrhea. Denies CP/Palp. Denies cough/wheeze/SOB. PMHX:  Type I DM dx'd at age 9, h/o inguinal abscess requiring draining and now s/p R buttock abscess I&D  PSHX:  Abscess drainage (inguinal and buttocks)  POBHX:  G0  PGYNHX:  Denies STD  ALL:  NKDA  Home Meds:  Insulin  Hospital Meds of note: Insulin, Unasyn, morphine, percocet  SHX:  Occ EtOH, Denies Tobacco, Denies Ilicit  FMHX:  Mother Lupus, Brother and grandparents DM    VS:  142/88, 80, 12, 98.5, 99%  Gen:  WDWN, NAD  Heart:  RRR without m/g/r  Lungs:  CTAB without w/c/r  Bilateral vulva notable for generalized edema. Left vulva notable for a 3.5 x 2 cm abscess, fluctuant, currently not draining, +TTP, appears to be localized to the left vulva (can completely isolate it on manual exam). Does not appear to track vaginally or rectally on palpation. EXAM: CT ABDOMEN PELVIS WITH CONTRAST  INDICATION: Perianal abscess, labial abscess, evaluate for perirectal abscess. COMPARISON: 12/8/2017. CONTRAST: 100 mL of Isovue-370.     TECHNIQUE:   Multislice helical CT was performed from the diaphragm to the symphysis pubis  during uneventful rapid bolus intravenous contrast administration. Oral contrast  was not administered. Contiguous 5 mm axial images were reconstructed and lung  and soft tissue windows were generated. Coronal and sagittal reformations were  generated.  CT dose reduction was achieved through use of a standardized protocol  tailored for this examination and automatic exposure control for dose  modulation.     FINDINGS:  LOWER CHEST: The visualized portions of the lung bases are clear. ABDOMEN:  Liver: The liver is normal in size and contour with no focal abnormality. Imaging was accomplished prior to optimal enhancement of the liver. The hepatic  veins are not enhanced. Gallbladder and bile ducts: There is no calcified gallstone or biliary  dilatation. Spleen: No abnormality. Pancreas: No abnormality. Adrenal glands: No abnormality. Kidneys: No abnormality. PELVIS:  Reproductive organs: The uterus is normal.   Bladder: No abnormality. BOWEL AND MESENTERY: The small bowel is normal.  There is no mesenteric mass or  adenopathy. The appendix is normal.  PERITONEUM: There is a small amount of ascites in the pelvis. RETROPERITONEUM: The aorta  tapers without aneurysm. There is no retroperitoneal  adenopathy or mass. There is no pelvic mass or adenopathy. BONES AND SOFT TISSUES: The bones and soft tissues of the abdominal wall are  within normal limits. There is a small 2.6 x 1.5 x 0.9 cm fluid collection in  the left labia and there is a small 3.1 x 1.7 x 3.5 cm fluid collection in the  right medial buttock. There is no perirectal collection.     IMPRESSION  IMPRESSION:   1. No perirectal abscess identified. 2. Small fluid collections in the left labia and medial right buttock.     Results for Raquel Soriano (MRN [id number]) as of 5/8/2018 11:13   Ref.  Range 5/5/2018 03:41   WBC Latest Ref Range: 3.6 - 11.0 K/uL 9.5   NRBC Latest Ref Range: 0  WBC 0.0   RBC Latest Ref Range: 3.80 - 5.20 M/uL 3.44 (L)   HGB Latest Ref Range: 11.5 - 16.0 g/dL 9.7 (L)   HCT Latest Ref Range: 35.0 - 47.0 % 30.9 (L)   MCV Latest Ref Range: 80.0 - 99.0 FL 89.8   MCH Latest Ref Range: 26.0 - 34.0 PG 28.2   MCHC Latest Ref Range: 30.0 - 36.5 g/dL 31.4   RDW Latest Ref Range: 11.5 - 14.5 % 14.9 (H)   PLATELET Latest Ref Range: 150 - 400 K/uL 322   MPV Latest Ref Range: 8.9 - 12.9 FL 9.9   NEUTROPHILS Latest Ref Range: 32 - 75 % 69   LYMPHOCYTES Latest Ref Range: 12 - 49 % 23   MONOCYTES Latest Ref Range: 5 - 13 % 6     A:  27 yo G0, Type I DM with left vulvar abscess requiring drainage. Last PO at 1100. P:  1. NPO  2. To OR for left vulvar abscess I&D per anesthesia and last po  3. Continue IV Unasyn  4. Plan cultures of abscess fluid  5. Will coordinate with hospitalist re: insulin management plan given NPO     ADDENDUM:  In the event EUA reveals that the abscess is c/w Bartholin's gland duct cyst abscess, a Word catheter obtained from the ED. Pt and mother counseled for I&D vs Word catheter placement vs Marsupialization. Consent for procedure signed and all questions answered.

## 2018-05-09 LAB
ANION GAP SERPL CALC-SCNC: 5 MMOL/L (ref 5–15)
BASOPHILS # BLD: 0 K/UL (ref 0–0.1)
BASOPHILS NFR BLD: 0 % (ref 0–1)
BUN SERPL-MCNC: 9 MG/DL (ref 6–20)
BUN/CREAT SERPL: 17 (ref 12–20)
CALCIUM SERPL-MCNC: 8.1 MG/DL (ref 8.5–10.1)
CHLORIDE SERPL-SCNC: 108 MMOL/L (ref 97–108)
CO2 SERPL-SCNC: 28 MMOL/L (ref 21–32)
CREAT SERPL-MCNC: 0.53 MG/DL (ref 0.55–1.02)
DIFFERENTIAL METHOD BLD: ABNORMAL
EOSINOPHIL # BLD: 0.1 K/UL (ref 0–0.4)
EOSINOPHIL NFR BLD: 3 % (ref 0–7)
ERYTHROCYTE [DISTWIDTH] IN BLOOD BY AUTOMATED COUNT: 15 % (ref 11.5–14.5)
GLUCOSE BLD STRIP.AUTO-MCNC: 121 MG/DL (ref 65–100)
GLUCOSE BLD STRIP.AUTO-MCNC: 175 MG/DL (ref 65–100)
GLUCOSE BLD STRIP.AUTO-MCNC: 209 MG/DL (ref 65–100)
GLUCOSE BLD STRIP.AUTO-MCNC: 74 MG/DL (ref 65–100)
GLUCOSE BLD STRIP.AUTO-MCNC: 95 MG/DL (ref 65–100)
GLUCOSE SERPL-MCNC: 62 MG/DL (ref 65–100)
HCT VFR BLD AUTO: 30.9 % (ref 35–47)
HGB BLD-MCNC: 9.5 G/DL (ref 11.5–16)
IMM GRANULOCYTES # BLD: 0 K/UL (ref 0–0.04)
IMM GRANULOCYTES NFR BLD AUTO: 0 % (ref 0–0.5)
LYMPHOCYTES # BLD: 2.2 K/UL (ref 0.8–3.5)
LYMPHOCYTES NFR BLD: 46 % (ref 12–49)
MCH RBC QN AUTO: 27.9 PG (ref 26–34)
MCHC RBC AUTO-ENTMCNC: 30.7 G/DL (ref 30–36.5)
MCV RBC AUTO: 90.9 FL (ref 80–99)
MONOCYTES # BLD: 0.5 K/UL (ref 0–1)
MONOCYTES NFR BLD: 11 % (ref 5–13)
NEUTS SEG # BLD: 1.9 K/UL (ref 1.8–8)
NEUTS SEG NFR BLD: 40 % (ref 32–75)
NRBC # BLD: 0 K/UL (ref 0–0.01)
NRBC BLD-RTO: 0 PER 100 WBC
PLATELET # BLD AUTO: 419 K/UL (ref 150–400)
PMV BLD AUTO: 9.7 FL (ref 8.9–12.9)
POTASSIUM SERPL-SCNC: 4 MMOL/L (ref 3.5–5.1)
RBC # BLD AUTO: 3.4 M/UL (ref 3.8–5.2)
SERVICE CMNT-IMP: ABNORMAL
SERVICE CMNT-IMP: NORMAL
SERVICE CMNT-IMP: NORMAL
SODIUM SERPL-SCNC: 141 MMOL/L (ref 136–145)
WBC # BLD AUTO: 4.8 K/UL (ref 3.6–11)

## 2018-05-09 PROCEDURE — 74011250636 HC RX REV CODE- 250/636: Performed by: INTERNAL MEDICINE

## 2018-05-09 PROCEDURE — 65270000015 HC RM PRIVATE ONCOLOGY

## 2018-05-09 PROCEDURE — 80048 BASIC METABOLIC PNL TOTAL CA: CPT | Performed by: INTERNAL MEDICINE

## 2018-05-09 PROCEDURE — 85025 COMPLETE CBC W/AUTO DIFF WBC: CPT | Performed by: INTERNAL MEDICINE

## 2018-05-09 PROCEDURE — 36415 COLL VENOUS BLD VENIPUNCTURE: CPT | Performed by: INTERNAL MEDICINE

## 2018-05-09 PROCEDURE — 74011250636 HC RX REV CODE- 250/636: Performed by: OBSTETRICS & GYNECOLOGY

## 2018-05-09 PROCEDURE — 76937 US GUIDE VASCULAR ACCESS: CPT

## 2018-05-09 PROCEDURE — 82962 GLUCOSE BLOOD TEST: CPT

## 2018-05-09 PROCEDURE — 74011636637 HC RX REV CODE- 636/637: Performed by: INTERNAL MEDICINE

## 2018-05-09 PROCEDURE — 74011000258 HC RX REV CODE- 258: Performed by: INTERNAL MEDICINE

## 2018-05-09 PROCEDURE — 74011250637 HC RX REV CODE- 250/637: Performed by: INTERNAL MEDICINE

## 2018-05-09 RX ADMIN — Medication 10 ML: at 21:08

## 2018-05-09 RX ADMIN — INSULIN GLARGINE 38 UNITS: 100 INJECTION, SOLUTION SUBCUTANEOUS at 21:04

## 2018-05-09 RX ADMIN — Medication 10 ML: at 05:59

## 2018-05-09 RX ADMIN — SODIUM CHLORIDE 75 ML/HR: 900 INJECTION, SOLUTION INTRAVENOUS at 03:36

## 2018-05-09 RX ADMIN — MORPHINE SULFATE 2 MG: 4 INJECTION INTRAVENOUS at 05:59

## 2018-05-09 RX ADMIN — INSULIN LISPRO 5 UNITS: 100 INJECTION, SOLUTION INTRAVENOUS; SUBCUTANEOUS at 13:39

## 2018-05-09 RX ADMIN — IBUPROFEN 600 MG: 600 TABLET, FILM COATED ORAL at 16:12

## 2018-05-09 RX ADMIN — SODIUM CHLORIDE 75 ML/HR: 900 INJECTION, SOLUTION INTRAVENOUS at 21:06

## 2018-05-09 RX ADMIN — AMPICILLIN SODIUM AND SULBACTAM SODIUM 3 G: 2; 1 INJECTION, POWDER, FOR SOLUTION INTRAMUSCULAR; INTRAVENOUS at 22:02

## 2018-05-09 RX ADMIN — MORPHINE SULFATE 2 MG: 4 INJECTION INTRAVENOUS at 19:52

## 2018-05-09 RX ADMIN — AMPICILLIN SODIUM AND SULBACTAM SODIUM 3 G: 2; 1 INJECTION, POWDER, FOR SOLUTION INTRAMUSCULAR; INTRAVENOUS at 17:22

## 2018-05-09 RX ADMIN — OXYCODONE HYDROCHLORIDE AND ACETAMINOPHEN 1 TABLET: 10; 325 TABLET ORAL at 07:02

## 2018-05-09 RX ADMIN — IBUPROFEN 600 MG: 600 TABLET, FILM COATED ORAL at 08:13

## 2018-05-09 RX ADMIN — OXYCODONE HYDROCHLORIDE AND ACETAMINOPHEN 1 TABLET: 10; 325 TABLET ORAL at 21:04

## 2018-05-09 RX ADMIN — AMPICILLIN SODIUM AND SULBACTAM SODIUM 3 G: 2; 1 INJECTION, POWDER, FOR SOLUTION INTRAMUSCULAR; INTRAVENOUS at 11:39

## 2018-05-09 RX ADMIN — MORPHINE SULFATE 2 MG: 4 INJECTION INTRAVENOUS at 13:40

## 2018-05-09 RX ADMIN — Medication 10 ML: at 15:12

## 2018-05-09 RX ADMIN — OXYCODONE HYDROCHLORIDE AND ACETAMINOPHEN 1 TABLET: 10; 325 TABLET ORAL at 16:11

## 2018-05-09 RX ADMIN — AMPICILLIN SODIUM AND SULBACTAM SODIUM 3 G: 2; 1 INJECTION, POWDER, FOR SOLUTION INTRAMUSCULAR; INTRAVENOUS at 05:58

## 2018-05-09 NOTE — DIABETES MGMT
DTC Progress Note    Recommendations/ Comments: If appropriate, please consider decreasing Lantus to 35 units daily, given BG of 62 mg/dL this am.    Chart reviewed on Dianna [facility]. Patient is a 26 y.o. female with 12 year history of Type 1 diabetes on Lantus 42 units, Lispro 1 unit for every 8 grams CHO ac or with 15 minutes of start of meals at home. A1c:   Lab Results   Component Value Date/Time    Hemoglobin A1c 11.7 (H) 05/04/2018 11:33 AM    Hemoglobin A1c 11.5 (H) 12/09/2017 12:15 AM       Recent Glucose Results:   Lab Results   Component Value Date/Time    GLU 62 (L) 05/09/2018 06:02 AM    GLUCPOC 175 (H) 05/09/2018 11:14 AM    GLUCPOC 95 05/09/2018 07:53 AM    GLUCPOC 74 05/09/2018 07:31 AM        Lab Results   Component Value Date/Time    Creatinine 0.53 (L) 05/09/2018 06:02 AM     Estimated Creatinine Clearance: 170.1 mL/min (based on Cr of 0.53). Active Orders   Diet    DIET DIABETIC CONSISTENT CARB Regular        PO intake: No data found. Current hospital DM medications: Lantus 38 units, Lispro 1 unit for every 8 grams CHO ac or with 15 minutes of start of meals    Will continue to follow as needed. Thank you.   Antonia Muñoz, 67 [address], [address]  Office:  [phone]

## 2018-05-09 NOTE — PROGRESS NOTES
Oncology Nursing Communication Tool  7:20 AM  5/9/2018     Bedside shift change report given to ILIR Gillespie RN (incoming nurse) by Ed Steve RN (outgoing nurse) on [facility]. Report included the following information SBAR. Shift Summary: Pt stable post-op. Surgeon frequently visited bedside throughout the night. Voiding appropriately. Issues for physician to address: Oncology Shift Note   Admission Date 5/4/2018   Admission Diagnosis Sepsis ([address]  Vulva Abscess   Code Status Full Code   Consults IP CONSULT TO GENERAL SURGERY  IP CONSULT TO GENERAL SURGERY  IP CONSULT TO OB GYN      Cardiac Monitoring [] Yes [] No      Purposeful Hourly Rounding [] Yes    Christina Score Total Score: 1   Christina score 3 or > [] Bed Alarm [] Avasys [] 1:1 sitter [] Patient refused (Place signed refusal form in chart)      Pain Managed [] Yes [] No    Key Pain Meds     The patient is on no pain meds. Influenza Vaccine Received Flu Vaccine for Current Season (usually Sept-March): Not Flu Season           Oxygen needs? [] Room air Oxygen @  []1L    []2L    []3L   []4L    []5L   []6L     Use home O2? [] Yes [] No  Perform O2 challenge test using  smartphrase (.oxygenchallenge)      Last bowel movement Last Bowel Movement Date: 05/05/18  bowel movement      Urinary Catheter             LDAs               Peripheral IV 05/04/18 Left Antecubital (Active)   Site Assessment Clean, dry, & intact 5/9/2018  3:00 AM   Phlebitis Assessment 0 5/9/2018  3:00 AM   Infiltration Assessment 0 5/9/2018  3:00 AM   Dressing Status Clean, dry, & intact 5/9/2018  3:00 AM   Dressing Type Tape;Transparent 5/9/2018  3:00 AM   Hub Color/Line Status Pink; Infusing;Flushed;Patent 5/9/2018  3:00 AM   Alcohol Cap Used Yes 5/7/2018  8:07 AM                         Readmission Risk Assessment Tool Score Low Risk            7       Total Score        3 Has Seen PCP in Last 6 Months (Yes=3, No=0)    4 IP Visits Last 12 Months (1-3=4, 4=9, >4=11)        Criteria that do not apply:    . Living with Significant Other. Assisted Living. LTAC. SNF. or   Rehab    Patient Length of Stay (>5 days = 3)    Pt.  Coverage (Medicare=5 , Medicaid, or Self-Pay=4)    Charlson Comorbidity Score (Age + Comorbid Conditions)       Expected Length of Stay 3d 16h   Actual Length of Stay [address], RN

## 2018-05-09 NOTE — PROGRESS NOTES
Hospitalist Progress Note    NAME: Haley Sigala   :  1995   MRN:  [id number]       Assessment / Plan:  Medial R buttock abscess, S/P I& D - done in ER  SIRS on admission, T 99   PAIN  Lactic acidosis - resolved. Left Labia abscess, fluctuant on  - s/p I&D   -CT a/p showed small fluid collections in the L labia and medial R buttock  -wound culture from buttock abscess grew Beta Hemolytic Strep, yeast, and anaerobic gram neg korina. -Bcx negative  -cont' Unasyn. End date . May need to change to oral med.  -pain well controlled on Percocet prn, motrin   -IV fluids discontinued.  -wound care per GYN     T1DM  -hypoglycemic this morning but asymptomatic. BG was 54 this morning. Reportedly appetite has been poor.    -will decrease lantus to 38U qhs. Her home dose is 42U qhs  -last A1c in 2017 was 11.7  -SSI     Anxiety  -appears stable.         Code Status: Full  Surrogate Decision Maker: pt's mom  DVT Prophylaxis: scds  GI Prophylaxis: not indicated  Baseline: independent       Body mass index is 25.9 kg/(m^2). Subjective:     Chief Complaint / Reason for Physician Visit  Follow up for abscesses. Pt seen at bedside. Pain well  Controlled with current regimen. Review of Systems:  Symptom Y/N Comments  Symptom Y/N Comments   Fever/Chills n   Chest Pain n    Poor Appetite n   Edema n    Cough n   Abdominal Pain n    Sputum n   Joint Pain     SOB/CHOE n   Pruritis/Rash     Nausea/vomit n   Tolerating PT/OT     Diarrhea n   Tolerating Diet n    Constipation    Other       Could NOT obtain due to:      Objective:     VITALS:   Last 24hrs VS reviewed since prior progress note.  Most recent are:  Patient Vitals for the past 24 hrs:   Temp Pulse Resp BP SpO2   18 0755 98.2 °F (36.8 °C) 99 16 (!) 137/91 -   18 0255 - 92 14 (!) 143/94 95 %   18 2303 - 100 14 126/79 95 %   18 2234 - (!) 102 16 134/86 93 %   18 2158 97.8 °F (36.6 °C) (!) 104 16 (!) 156/93 96 % 05/08/18 2123 - 97 16 (!) 148/91 94 %   05/08/18 2118 98.4 °F (36.9 °C) 100 14 (!) 156/98 93 %   05/08/18 2104 98.8 °F (37.1 °C) (!) 101 14 (!) 147/94 94 %   05/08/18 2045 - 98 13 144/90 98 %   05/08/18 2030 98.2 °F (36.8 °C) 95 9 140/85 98 %   05/08/18 2025 - 96 9 (!) 138/93 97 %   05/08/18 2020 - 96 8 143/86 96 %   05/08/18 2015 - 98 10 (!) 147/92 98 %   05/08/18 2013 - (!) 102 16 (!) 150/97 97 %   05/08/18 2012 98.2 °F (36.8 °C) (!) 103 15 (!) 150/97 97 %   05/08/18 1911 - - 16 141/86 97 %   05/08/18 1500 98.2 °F (36.8 °C) 89 16 134/84 95 %   05/08/18 1115 98.5 °F (36.9 °C) 99 16 142/88 99 %       Intake/Output Summary (Last 24 hours) at 05/09/18 0836  Last data filed at 05/08/18 2007   Gross per 24 hour   Intake              200 ml   Output                5 ml   Net              195 ml        PHYSICAL EXAM:  General: WD, WN. Alert, cooperative, no acute distress    EENT:  EOMI. Anicteric sclerae. MMM  Resp:  CTA bilaterally, no wheezing or rales. No accessory muscle use  CV:  Regular  rhythm,  No edema  GI:  Soft, Non distended, Non tender.  +Bowel sounds  Neurologic:  Alert and oriented X 3, normal speech  Psych:   Good insight. Not anxious nor agitated  Skin:  No rashes. No jaundice:                       Reviewed most current lab test results and cultures  YES  Reviewed most current radiology test results   YES  Review and summation of old records today    NO  Reviewed patient's current orders and MAR    YES  PMH/SH reviewed - no change compared to H&P  ________________________________________________________________________  Care Plan discussed with:    Comments   Patient y    Family  y mother   RN     Care Manager     Consultant                        Multidiciplinary team rounds were held today with , nursing, pharmacist and clinical coordinator. Patient's plan of care was discussed; medications were reviewed and discharge planning was addressed. ________________________________________________________________________  Total NON critical care TIME:  30 Minutes    Total CRITICAL CARE TIME Spent:   Minutes non procedure based      Comments   >50% of visit spent in counseling and coordination of care     ________________________________________________________________________  Silvestre Berry MD     Procedures: see electronic medical records for all procedures/Xrays and details which were not copied into this note but were reviewed prior to creation of Plan. LABS:  I reviewed today's most current labs and imaging studies.   Pertinent labs include:  Recent Labs      05/09/18   0602   WBC  4.8   HGB  9.5*   HCT  30.9*   PLT  419*     Recent Labs      05/09/18   0602  05/08/18   0930   NA  141  139   K  4.0  4.5   CL  108  106   CO2  28  26   GLU  62*  137*   BUN  9  10   CREA  0.53*  0.53*   CA  8.1*  8.4*       Signed: Silvestre Berry MD

## 2018-05-09 NOTE — OP NOTES
OPERATIVE REPORT    Preop Dx:     1. Type I DM    2. Left vulvar abscess  Post op Dx:  JACOB  Procedure:   1. EUA    2. Left vulvar abscess I&D  Surgeon:  Dr. Tim Lacey. [place]  Anesthesia: MAC and local  Surgical Start Time:  1951  Surgical Stop Time:  0940  Complications:  None  Specimens:   1. GC/Chlam    2. Aerobic culture    3/  Anaerobic culture  EBL:  5 ml  Fluids:  200 ml crystalloid  UO:  None (voided just prior to case)    Indications:  25 yo G0 with Type I DM admitted 4 days ago with sepsis and right buttocks abscess (s/p drainage) who developed a left vulvar abscess (non-communicating)    Findings:  3.5 x 2.5 x 2.5 cm left vulvar abscess, NOT a Bartholin's Gland duct abscess. Confined to the vulva. Not tracking superiorly into the vagina nor out onto the buttocks. After drainage, significant induration was noted at the base of the abscess. Procedure:  Informed, signed consent was obtained. The patient was taken to the operating room with IV running. Patient placed in supine position and MAC obtained without difficulty. Patient placed in high lithotomy position and prepped and draped in standard, sterile fashion. Time out conducted. Exam under anesthesia conducted with findings as above. 3 ml of 1% lidocaine plain injected. Using a number 11 scalpel, a stab incision was made in the pointing area of the abscess. Copious purulent material was noted. Cultures as above were obtained. The abscess was expressed and then probed to ensure all pockets drained. The cavity was then packed with iodoform guaze. Peripad applied. The patient was returned to the supine position and taken to the PACU having tolerated the procedure well.

## 2018-05-09 NOTE — PERIOP NOTES
Handoff Report from Operating Room to PACU    Report received from Karlos Taylor Dr.  and Priscille Severance CRNA  regarding Buddy Rosas. Surgeon(s):  Tenzin Edwards MD  And Procedure(s) (LRB):  INCISION AND DRAINAGE LEFT VULVA ABSCESS  (Left)  confirmed   with dressings discussed. Anesthesia type, drugs, patient history, complications, estimated blood loss, vital signs, intake and output, and last pain medication were reviewed.

## 2018-05-09 NOTE — PERIOP NOTES
TRANSFER - OUT REPORT:    Verbal report given to Nicolasa Cummings RN (name) on Lacie Jaime  being transferred to 1114(unit) for routine post - op       Report consisted of patients Situation, Background, Assessment and   Recommendations(SBAR). Information from the following report(s) SBAR, Kardex, OR Summary, Intake/Output, MAR and Recent Results was reviewed with the receiving nurse. Opportunity for questions and clarification was provided.       Patient transported with:   Registered Nurse

## 2018-05-09 NOTE — ANESTHESIA POSTPROCEDURE EVALUATION
Post-Anesthesia Evaluation and Assessment    Patient: Buddy Rosas MRN: [id number]  SSN: [id number]    YOB: 1995  Age: 26 y.o. Sex: female       Cardiovascular Function/Vital Signs  Visit Vitals    /85    Pulse 95    Temp 36.8 °C (98.2 °F)    Resp 9    Ht 5' 6\" (1.676 m)    Wt 72.8 kg (160 lb 7.9 oz)    SpO2 98%    Breastfeeding No    BMI 25.9 kg/m2       Patient is status post MAC anesthesia for Procedure(s):  INCISION AND DRAINAGE LEFT VULVA ABSCESS . Nausea/Vomiting: None    Postoperative hydration reviewed and adequate. Pain:  Pain Scale 1: Numeric (0 - 10) (05/08/18 2012)  Pain Intensity 1: 0 (05/08/18 2012)   Managed    Neurological Status:   Neuro (WDL): Within Defined Limits (05/08/18 2012)   At baseline    Mental Status and Level of Consciousness: Arousable    Pulmonary Status:   O2 Device: Nasal cannula (05/08/18 2012)   Adequate oxygenation and airway patent    Complications related to anesthesia: None    Post-anesthesia assessment completed.  No concerns    Signed By: Dora Barrios MD     May 8, 2018

## 2018-05-09 NOTE — PROGRESS NOTES
Oncology Interdisciplinary rounds were held today to discuss patient plan of care and outcomes. The following members were present: Nursing, Case Management, Pharmacy and Dietary. Actual Length of Stay: 5    DRG GLOS: 3.7    Expected Length of Stay: 3d 16h                Plan for Day        Mobility        Plan for Stay      Plan for Way   Pain control   Hydration   ABX   Wound care Up in chair Continue current [address] [place] [address] D/C Friday? ?

## 2018-05-09 NOTE — PROGRESS NOTES
Oncology Nursing Communication Tool  7:11 PM  5/9/2018     Bedside shift change report given to Jamarcus Adam RN (incoming nurse) by Diana Myers RN (outgoing nurse) on [facility]. Report included the following information SBAR. Shift Summary:       Issues for physician to address: Oncology Shift Note   Admission Date 5/4/2018   Admission Diagnosis Sepsis ([address]  Vulva Abscess   Code Status Full Code   Consults IP CONSULT TO GENERAL SURGERY  IP CONSULT TO GENERAL SURGERY  IP CONSULT TO OB GYN      Cardiac Monitoring [x] Yes [] No      Purposeful Hourly Rounding [x] Yes    Christina Score Total Score: 1   Christina score 3 or > [] Bed Alarm [] Avasys [] 1:1 sitter [] Patient refused (Place signed refusal form in chart)      Pain Managed [x] Yes [] No    Key Pain Meds     The patient is on no pain meds. Influenza Vaccine Received Flu Vaccine for Current Season (usually Sept-March): Not Flu Season           Oxygen needs? [] Room air Oxygen @  []1L    []2L    []3L   []4L    []5L   []6L     Use home O2? [] Yes [] No  Perform O2 challenge test using  smartphrase (.oxygenchallenge)      Last bowel movement Last Bowel Movement Date: 05/05/18  bowel movement      Urinary Catheter             LDAs               Peripheral IV 05/04/18 Left Antecubital (Active)   Site Assessment Clean, dry, & intact 5/9/2018  8:11 AM   Phlebitis Assessment 0 5/9/2018  8:11 AM   Infiltration Assessment 0 5/9/2018  8:11 AM   Dressing Status Clean, dry, & intact 5/9/2018  8:11 AM   Dressing Type Tape;Transparent 5/9/2018  8:11 AM   Hub Color/Line Status Pink 5/9/2018  8:11 AM   Alcohol Cap Used Yes 5/9/2018  8:11 AM       Peripheral IV 05/09/18 Left Forearm (Active)   Site Assessment Clean, dry, & intact 5/9/2018  5:14 PM   Phlebitis Assessment 0 5/9/2018  5:14 PM   Infiltration Assessment 0 5/9/2018  5:14 PM   Dressing Status Clean, dry, & intact; Occlusive;New 5/9/2018  5:14 PM   Dressing Type Tape;Transparent 5/9/2018 5:14 PM   Hub Color/Line Status Pink;Capped;Flushed;Patent 5/9/2018  5:14 PM   Action Taken Other (comment) 5/9/2018  5:14 PM                         Readmission Risk Assessment Tool Score Low Risk            10       Total Score        3 Has Seen PCP in Last 6 Months (Yes=3, No=0)    3 Patient Length of Stay (>5 days = 3)    4 IP Visits Last 12 Months (1-3=4, 4=9, >4=11)        Criteria that do not apply:    . Living with Significant Other. Assisted Living. LTAC. SNF. or   Rehab    Pt.  Coverage (Medicare=5 , Medicaid, or Self-Pay=4)    Charlson Comorbidity Score (Age + Comorbid Conditions)       Expected Length of Stay 3d 16h   Actual Length of Stay [address] Zaragoza RN

## 2018-05-09 NOTE — PROGRESS NOTES
S:  Pt continuing to c/o vulvar pain and describes it as \"stinging\". Slept overnight with pain meds. Resumed diabetic diet without N/V following procedure. Has not been OOB since surgery. O:  143/94, 92, 14, 95%RA, 97.8  Gen:  Tired appearing WF, NAD  Perineum:  Iodoform guaze still in place at site of I&D left vulva. Serosanguinous appearing drainage on pad.  + edema throughout bilateral vulva (unchanged). +TTP/erythema over operative site/left vulvar abscess. Morning labs:  WBC 4.8, HCT 30.9, Cr 0.53    A:  27 yo G0 with Type I DM and s/p right buttocks and now left vulvar abscess I&D. Iodoform guaze remains in place at site of vulvar I&D. Cultures pending. P:  1. Continue IV Unasyn for now based on buttocks I&D cultures  2. GC/Chlam, Aerobid and anaerobic cultures of left vulvar abscess pending  3. Continue pain management  4. Blood sugar management as per IM hospitalists  5. Pt may shower  6. GYN will continue to follow and manage left vulvar abscess. Iodoform guaze to remain in place until at least 24 hours post op then plan will be made from there.

## 2018-05-10 LAB
ANION GAP SERPL CALC-SCNC: 5 MMOL/L (ref 5–15)
BACTERIA SPEC CULT: ABNORMAL
BACTERIA SPEC CULT: NORMAL
BUN SERPL-MCNC: 10 MG/DL (ref 6–20)
BUN/CREAT SERPL: 26 (ref 12–20)
CALCIUM SERPL-MCNC: 8.3 MG/DL (ref 8.5–10.1)
CHLORIDE SERPL-SCNC: 106 MMOL/L (ref 97–108)
CO2 SERPL-SCNC: 26 MMOL/L (ref 21–32)
CREAT SERPL-MCNC: 0.38 MG/DL (ref 0.55–1.02)
GLUCOSE BLD STRIP.AUTO-MCNC: 106 MG/DL (ref 65–100)
GLUCOSE BLD STRIP.AUTO-MCNC: 62 MG/DL (ref 65–100)
GLUCOSE BLD STRIP.AUTO-MCNC: 70 MG/DL (ref 65–100)
GLUCOSE BLD STRIP.AUTO-MCNC: 70 MG/DL (ref 65–100)
GLUCOSE BLD STRIP.AUTO-MCNC: 71 MG/DL (ref 65–100)
GLUCOSE BLD STRIP.AUTO-MCNC: 72 MG/DL (ref 65–100)
GLUCOSE BLD STRIP.AUTO-MCNC: 72 MG/DL (ref 65–100)
GLUCOSE BLD STRIP.AUTO-MCNC: 74 MG/DL (ref 65–100)
GLUCOSE BLD STRIP.AUTO-MCNC: 76 MG/DL (ref 65–100)
GLUCOSE BLD STRIP.AUTO-MCNC: 80 MG/DL (ref 65–100)
GLUCOSE BLD STRIP.AUTO-MCNC: 81 MG/DL (ref 65–100)
GLUCOSE BLD STRIP.AUTO-MCNC: 89 MG/DL (ref 65–100)
GLUCOSE SERPL-MCNC: 89 MG/DL (ref 65–100)
GRAM STN SPEC: ABNORMAL
GRAM STN SPEC: ABNORMAL
POTASSIUM SERPL-SCNC: 4.1 MMOL/L (ref 3.5–5.1)
SERVICE CMNT-IMP: ABNORMAL
SERVICE CMNT-IMP: NORMAL
SODIUM SERPL-SCNC: 137 MMOL/L (ref 136–145)

## 2018-05-10 PROCEDURE — 82962 GLUCOSE BLOOD TEST: CPT

## 2018-05-10 PROCEDURE — 74011000258 HC RX REV CODE- 258: Performed by: INTERNAL MEDICINE

## 2018-05-10 PROCEDURE — 36415 COLL VENOUS BLD VENIPUNCTURE: CPT | Performed by: INTERNAL MEDICINE

## 2018-05-10 PROCEDURE — 74011250636 HC RX REV CODE- 250/636: Performed by: INTERNAL MEDICINE

## 2018-05-10 PROCEDURE — 74011000250 HC RX REV CODE- 250: Performed by: INTERNAL MEDICINE

## 2018-05-10 PROCEDURE — 74011636637 HC RX REV CODE- 636/637: Performed by: INTERNAL MEDICINE

## 2018-05-10 PROCEDURE — 74011250637 HC RX REV CODE- 250/637: Performed by: INTERNAL MEDICINE

## 2018-05-10 PROCEDURE — 65270000015 HC RM PRIVATE ONCOLOGY

## 2018-05-10 PROCEDURE — 80048 BASIC METABOLIC PNL TOTAL CA: CPT | Performed by: INTERNAL MEDICINE

## 2018-05-10 RX ORDER — INSULIN GLARGINE 100 [IU]/ML
10 INJECTION, SOLUTION SUBCUTANEOUS ONCE
Status: COMPLETED | OUTPATIENT
Start: 2018-05-10 | End: 2018-05-10

## 2018-05-10 RX ADMIN — AMPICILLIN SODIUM AND SULBACTAM SODIUM 3 G: 2; 1 INJECTION, POWDER, FOR SOLUTION INTRAMUSCULAR; INTRAVENOUS at 17:20

## 2018-05-10 RX ADMIN — DEXTROSE MONOHYDRATE 12.5 G: 25 INJECTION, SOLUTION INTRAVENOUS at 21:30

## 2018-05-10 RX ADMIN — IBUPROFEN 600 MG: 600 TABLET, FILM COATED ORAL at 08:11

## 2018-05-10 RX ADMIN — ONDANSETRON 4 MG: 2 INJECTION INTRAMUSCULAR; INTRAVENOUS at 05:47

## 2018-05-10 RX ADMIN — Medication 10 ML: at 13:01

## 2018-05-10 RX ADMIN — MORPHINE SULFATE 2 MG: 4 INJECTION INTRAVENOUS at 05:45

## 2018-05-10 RX ADMIN — INSULIN GLARGINE 10 UNITS: 100 INJECTION, SOLUTION SUBCUTANEOUS at 22:31

## 2018-05-10 RX ADMIN — OXYCODONE HYDROCHLORIDE AND ACETAMINOPHEN 1 TABLET: 10; 325 TABLET ORAL at 10:48

## 2018-05-10 RX ADMIN — DEXTROSE MONOHYDRATE 12.5 G: 25 INJECTION, SOLUTION INTRAVENOUS at 13:39

## 2018-05-10 RX ADMIN — Medication 10 ML: at 20:48

## 2018-05-10 RX ADMIN — MORPHINE SULFATE 2 MG: 4 INJECTION INTRAVENOUS at 13:01

## 2018-05-10 RX ADMIN — IBUPROFEN 600 MG: 600 TABLET, FILM COATED ORAL at 15:37

## 2018-05-10 RX ADMIN — MORPHINE SULFATE 2 MG: 4 INJECTION INTRAVENOUS at 17:15

## 2018-05-10 RX ADMIN — Medication 10 ML: at 05:11

## 2018-05-10 RX ADMIN — OXYCODONE HYDROCHLORIDE AND ACETAMINOPHEN 1 TABLET: 10; 325 TABLET ORAL at 15:37

## 2018-05-10 RX ADMIN — MORPHINE SULFATE 2 MG: 4 INJECTION INTRAVENOUS at 00:26

## 2018-05-10 RX ADMIN — ONDANSETRON 4 MG: 2 INJECTION INTRAMUSCULAR; INTRAVENOUS at 20:48

## 2018-05-10 RX ADMIN — OXYCODONE HYDROCHLORIDE AND ACETAMINOPHEN 1 TABLET: 10; 325 TABLET ORAL at 20:48

## 2018-05-10 RX ADMIN — OXYCODONE HYDROCHLORIDE AND ACETAMINOPHEN 1 TABLET: 10; 325 TABLET ORAL at 05:18

## 2018-05-10 RX ADMIN — AMPICILLIN SODIUM AND SULBACTAM SODIUM 3 G: 2; 1 INJECTION, POWDER, FOR SOLUTION INTRAMUSCULAR; INTRAVENOUS at 22:38

## 2018-05-10 RX ADMIN — AMPICILLIN SODIUM AND SULBACTAM SODIUM 3 G: 2; 1 INJECTION, POWDER, FOR SOLUTION INTRAMUSCULAR; INTRAVENOUS at 11:41

## 2018-05-10 RX ADMIN — AMPICILLIN SODIUM AND SULBACTAM SODIUM 3 G: 2; 1 INJECTION, POWDER, FOR SOLUTION INTRAMUSCULAR; INTRAVENOUS at 05:11

## 2018-05-10 RX ADMIN — MORPHINE SULFATE 2 MG: 4 INJECTION INTRAVENOUS at 22:31

## 2018-05-10 RX ADMIN — ONDANSETRON 4 MG: 2 INJECTION INTRAMUSCULAR; INTRAVENOUS at 10:47

## 2018-05-10 RX ADMIN — ONDANSETRON 4 MG: 2 INJECTION INTRAMUSCULAR; INTRAVENOUS at 15:37

## 2018-05-10 NOTE — PROGRESS NOTES
Bedside and Verbal shift change report given to chepe Melo (oncoming nurse) by Ashly Arredondo (offgoing nurse).  Report included the following information SBAR, Procedure Summary, Intake/Output, MAR, Recent Results and Cardiac Rhythm ST.

## 2018-05-10 NOTE — PROGRESS NOTES
Hospitalist Progress Note    NAME: Yvon Kelly   :  1995   MRN:  [id number]       Assessment / Plan:  Medial R buttock abscess, S/P I& D - done in ER  -wound culture from buttock abscess grew Beta Hemolytic Strep, yeast, and anaerobic gram neg korina. -Bcx negative  -cont' Unasyn. End date . Change to oral abx to complete another 7 days. -pain control    SIRS on admission, T 99   PAIN  Lactic acidosis - resolved. Left Labia abscess, fluctuant on  - s/p I&D   -cont' Unasyn. End date . Change to oral abx to complete another 7 days. -pain well controlled on Percocet prn, motrin   -no organisms in culture thus far.  -wound care per GYN     T1DM  -accu checks - glucoses in 70's   -cont lantus to 38 U qhs. Her home dose is 42 U qhs  -SSI  -A1c 11.7 on 18     Anxiety  -appears stable.        Plan:  Treatment as above  Dc home once cleared by GYN     Summary:  Presented to ED w/ worsening swelling,redness, and pain R gluteal fold and L labia majora. Patient tachycardic upon admission. Lactic acid elevated. WBC normal.  CT abd/pelvis revealed small fluid collections L labia and medial right buttock. IV antibiotic initiated. I&D R medial buttock done in ED. GYN consulted. I&D L labia done on  by GYN. Received IV fluids. Code Status: Full  Surrogate Decision Maker: pt's mom  DVT Prophylaxis: scds  GI Prophylaxis: not indicated  Baseline: independent       Body mass index is 25.9 kg/(m^2). Subjective:     Chief Complaint / Reason for Physician Visit  Follow up for abscesses. Pt seen at bedside. Occasional pain. Nausea earlier, but no vomiting.       Review of Systems:  Symptom Y/N Comments  Symptom Y/N Comments   Fever/Chills n   Chest Pain n    Poor Appetite n   Edema n    Cough n   Abdominal Pain n    Sputum n   Joint Pain     SOB/CHOE n   Pruritis/Rash     Nausea/vomit    Tolerating PT/OT     Diarrhea n   Tolerating Diet n    Constipation    Other       Could NOT obtain due to:      Objective:     VITALS:   Last 24hrs VS reviewed since prior progress note. Most recent are:  Patient Vitals for the past 24 hrs:   Temp Pulse Resp BP SpO2   05/10/18 1130 98.1 °F (36.7 °C) 97 16 135/86 92 %   05/10/18 0800 97.9 °F (36.6 °C) (!) 101 16 136/82 94 %   05/10/18 0241 98.3 °F (36.8 °C) (!) 107 18 (!) 141/93 96 %   05/09/18 2239 98.2 °F (36.8 °C) (!) 103 16 141/87 94 %   05/09/18 1953 98.6 °F (37 °C) (!) 109 18 (!) 150/97 92 %   05/09/18 1500 98.2 °F (36.8 °C) (!) 102 16 141/89 97 %     No intake or output data in the 24 hours ending 05/10/18 1344     PHYSICAL EXAM:  General: WD, WN. Alert, cooperative, no acute distress    EENT:  EOMI. Anicteric sclerae. MMM  Resp:  CTA bilaterally, no wheezing or rales. No accessory muscle use  CV:  Regular  rhythm,  No edema  GI:  Soft, Non distended, Non tender.  +Bowel sounds  Neurologic:  Alert and oriented X 3, normal speech  Psych:   Good insight. Not anxious nor agitated  Skin:  No rashes. No jaundice:                       Reviewed most current lab test results and cultures  YES  Reviewed most current radiology test results   YES  Review and summation of old records today    NO  Reviewed patient's current orders and MAR    YES  PMH/ reviewed - no change compared to H&P  ________________________________________________________________________  Care Plan discussed with:    Comments   Patient y    Family      RN     Care Manager     Consultant                        Multidiciplinary team rounds were held today with , nursing, pharmacist and clinical coordinator. Patient's plan of care was discussed; medications were reviewed and discharge planning was addressed.      ________________________________________________________________________  Total NON critical care TIME:  25 Minutes    Total CRITICAL CARE TIME Spent:   Minutes non procedure based      Comments   >50% of visit spent in counseling and coordination of care ________________________________________________________________________  Moisés Almanza MD     Procedures: see electronic medical records for all procedures/Xrays and details which were not copied into this note but were reviewed prior to creation of Plan. LABS:  I reviewed today's most current labs and imaging studies.   Pertinent labs include:  Recent Labs      05/09/18   0602   WBC  4.8   HGB  9.5*   HCT  30.9*   PLT  419*     Recent Labs      05/10/18   0514  05/09/18   0602  05/08/18   0930   NA  137  141  139   K  4.1  4.0  4.5   CL  106  108  106   CO2  26  28  26   GLU  89  62*  137*   BUN  10  9  10   CREA  0.38*  0.53*  0.53*   CA  8.3*  8.1*  8.4*       Signed: Moisés Almanza MD

## 2018-05-10 NOTE — PROGRESS NOTES
Gynecology Progress Note    Jonelle Harris    Assesment: Vulvar abscess/celulitis improving    Plan: packing partially removed; continue ATB regimen    She states still very sore, but better. Pain controlled on current medication. Voiding without difficulty. Patient is passing flatus. She is is tolerating her diet.     Vitals:  Visit Vitals    BP (!) 141/93 (BP 1 Location: Right arm, BP Patient Position: At rest)    Pulse (!) 107    Temp 98.3 °F (36.8 °C)    Resp 18    Ht 5' 6\" (1.676 m)    Wt 72.8 kg (160 lb 7.9 oz)    LMP 2018    SpO2 96%    Breastfeeding No    BMI 25.9 kg/m2     Temp (24hrs), Av.5 °F (36.9 °C), Min:98.2 °F (36.8 °C), Max:99.2 °F (37.3 °C)      Exam:  General: alert, cooperative, no distress, appears stated age     Abdomen: abdomen is soft without significant tenderness, masses, organomegaly or guarding     Perineum:  Left labium majorum--significant induration, erythema, tenderness; no fluctuance     Iodoform packing noted    Labs:   Lab Results   Component Value Date/Time    WBC 4.8 2018 06:02 AM    WBC 9.5 2018 03:41 AM    WBC 9.5 2018 11:33 AM    WBC 6.0 12/10/2017 03:47 AM    WBC 13.3 (H) 2017 12:15 AM    WBC 10.4 2017 06:22 PM    WBC 7.0 2017 01:55 AM    WBC 5.9 2017 11:22 AM    WBC 9.8 10/24/2017 12:18 PM    HGB 9.5 (L) 2018 06:02 AM    HGB 9.7 (L) 2018 03:41 AM    HGB 11.7 2018 11:33 AM    HGB 10.4 (L) 12/10/2017 03:47 AM    HGB 11.9 2017 12:15 AM    HGB 12.8 2017 06:22 PM    HGB 10.7 (L) 2017 01:55 AM    HGB 12.6 2017 11:22 AM    HGB 12.2 10/24/2017 12:18 PM    HCT 30.9 (L) 2018 06:02 AM    HCT 30.9 (L) 2018 03:41 AM    HCT 35.5 2018 11:33 AM    HCT 33.5 (L) 12/10/2017 03:47 AM    HCT 37.2 2017 12:15 AM    HCT 42.1 2017 06:22 PM    HCT 33.0 (L) 2017 01:55 AM    HCT 38.4 2017 11:22 AM    HCT 38.1 10/24/2017 12:18 PM    PLATELET 023 (H)  06:02 AM    PLATELET 339 29/03/7233 03:41 AM    PLATELET 362 39/62/1696 11:33 AM    PLATELET 333 52/15/8139 03:47 AM    PLATELET 804 89/85/3060 12:15 AM    PLATELET 202 (H) 03/66/9083 06:22 PM    PLATELET 104 04/07/8686 01:55 AM    PLATELET 658 (H) 06/51/3237 11:22 AM    PLATELET 274 (H) 48/10/6378 12:18 PM       Recent Results (from the past 24 hour(s))   GLUCOSE, POC    Collection Time: 05/09/18  7:53 AM   Result Value Ref Range    Glucose (POC) 95 65 - 100 mg/dL    Performed by Stefania Berry (PCT)    GLUCOSE, POC    Collection Time: 05/09/18 11:14 AM   Result Value Ref Range    Glucose (POC) 175 (H) 65 - 100 mg/dL    Performed by Stefania Berry (PCT)    GLUCOSE, POC    Collection Time: 05/09/18  4:47 PM   Result Value Ref Range    Glucose (POC) 121 (H) 65 - 100 mg/dL    Performed by Tiarra Alvarez (PCT)    GLUCOSE, POC    Collection Time: 05/09/18  8:46 PM   Result Value Ref Range    Glucose (POC) 209 (H) 65 - 100 mg/dL    Performed by [address] [place] [address], [facility]    Collection Time: 05/10/18  5:14 AM   Result Value Ref Range    Sodium 137 136 - 145 mmol/L    Potassium 4.1 3.5 - 5.1 mmol/L    Chloride 106 97 - 108 mmol/L    CO2 26 21 - 32 mmol/L    Anion gap 5 5 - 15 mmol/L    Glucose 89 65 - 100 mg/dL    BUN 10 6 - 20 MG/DL    Creatinine 0.38 (L) 0.55 - 1.02 MG/DL    BUN/Creatinine ratio 26 (H) 12 - 20      GFR est AA >60 >60 ml/min/1.73m2    GFR est non-AA >60 >60 ml/min/1.73m2    Calcium 8.3 (L) 8.5 - 10.1 MG/DL   GLUCOSE, POC    Collection Time: 05/10/18  7:30 AM   Result Value Ref Range    Glucose (POC) 71 65 - 100 mg/dL    Performed by Gwyn Hart

## 2018-05-10 NOTE — INTERDISCIPLINARY ROUNDS
Oncology Interdisciplinary rounds were held today to discuss patient plan of care and outcomes. The following members were present: Nursing, Case Management, Pharmacy and Dietary. Actual Length of Stay: 6    DRG GLOS: 3.7    Expected Length of Stay: 3d 16h                Plan for Day        Mobility        Plan for Stay      Plan for Way   Pain control  Up in chair for meals Continue current TX D/C tomorrow?

## 2018-05-10 NOTE — PROGRESS NOTES
Oncology Nursing Communication Tool  0715 AM  5/10/2018     Bedside shift change report given to Melvina Wagoner RN (incoming nurse) by Monse Sharma RN (outgoing nurse) on [facility]. Report included the following information SBAR. Shift Summary: Pt rested comfortably throughout the night. Admin PRN morphine and percocet. Issues for physician to address: Discharge? Oncology Shift Note   Admission Date 5/4/2018   Admission Diagnosis Sepsis ([address]  Vulva Abscess   Code Status Full Code   Consults IP CONSULT TO GENERAL SURGERY  IP CONSULT TO GENERAL SURGERY  IP CONSULT TO OB GYN      Cardiac Monitoring [] Yes [] No      Purposeful Hourly Rounding [] Yes    Christina Score Total Score: 1   Christina score 3 or > [] Bed Alarm [] Avasys [] 1:1 sitter [] Patient refused (Place signed refusal form in chart)      Pain Managed [] Yes [] No    Key Pain Meds     The patient is on no pain meds. Influenza Vaccine Received Flu Vaccine for Current Season (usually Sept-March): Not Flu Season           Oxygen needs? [] Room air Oxygen @  []1L    []2L    []3L   []4L    []5L   []6L     Use home O2? [] Yes [] No  Perform O2 challenge test using  smartphrase (.oxygenchallenge)      Last bowel movement Last Bowel Movement Date: 05/08/18  bowel movement      Urinary Catheter             LDAs               Peripheral IV 05/09/18 Left Forearm (Active)   Site Assessment Clean, dry, & intact 5/10/2018  2:15 AM   Phlebitis Assessment 0 5/10/2018  2:15 AM   Infiltration Assessment 0 5/10/2018  2:15 AM   Dressing Status Clean, dry, & intact 5/10/2018  2:15 AM   Dressing Type Tape;Transparent 5/10/2018  2:15 AM   Hub Color/Line Status Pink; Infusing;Flushed;Patent 5/10/2018  2:15 AM   Action Taken Other (comment) 5/9/2018  5:14 PM                         Readmission Risk Assessment Tool Score Low Risk            10       Total Score        3 Has Seen PCP in Last 6 Months (Yes=3, No=0)    3 Patient Length of Stay (>5 days = 3)    4 IP Visits Last 12 Months (1-3=4, 4=9, >4=11)        Criteria that do not apply:    . Living with Significant Other. Assisted Living. LTAC. SNF. or   Rehab    Pt.  Coverage (Medicare=5 , Medicaid, or Self-Pay=4)    Charlson Comorbidity Score (Age + Comorbid Conditions)       Expected Length of Stay 3d 16h   Actual Length of Stay 6          Alicia Stout RN

## 2018-05-10 NOTE — PROGRESS NOTES
Nutrition Assessment:    INTERVENTIONS/RECOMMENDATIONS:   Continue consistent carb diet    ASSESSMENT:   Chart reviewed. Pt reports her PO intake has improved and now consuming 50-75% of meals. BG well controlled, DTC following. Diet Order: Consistent carb  % Eaten:  No data found. Pertinent Medications: [x]Reviewed: lantus, humalog, zofran,   Pertinent Labs: [x]Reviewed: BG well controlled  Food Allergies: [x]NKFA  []Other   Last BM:  5/8  Edema:      []RUE   []LUE   []RLE   []LLE      Pressure Ulcer:      [] Stage I   [] Stage II   [] Stage III   [] Stage IV      Anthropometrics: Height: 5' 6\" (167.6 cm) Weight: 72.8 kg (160 lb 7.9 oz)    IBW (%IBW):   ( ) UBW (%UBW): 76.7 kg (169 lb) (per pt 'a while ago) (94.97 %)    BMI: Body mass index is 25.9 kg/(m^2). This BMI is indicative of:  []Underweight   []Normal   [x]Overweight   [] Obesity   [] Extreme Obesity (BMI>40)  Last Weight Metrics:  Weight Loss Metrics 5/5/2018 12/11/2017 11/28/2017 10/24/2017   Today's Wt 160 lb 7.9 oz 152 lb 8.9 oz 157 lb 3 oz 167 lb 8.8 oz   BMI 25.9 kg/m2 24.64 kg/m2 25.37 kg/m2 27.04 kg/m2       Estimated Nutrition Needs (Based on): 1955 Kcals/day (MSJ 1504 x 1.3) , 58 g (0.8gPro/kg) Protein  Carbohydrate: At Least 130 g/day  Fluids: 1955 mL/day or per primary team    NUTRITION DIAGNOSES:   Problem:  Altered nutrition-related lab values      Etiology: related to hx of DM and likely diet non-compliance      Signs/Symptoms: as evidenced by -345 and hga1c 11.7. Previous Nutrition Dx:  [x] Resolved  [] Unresolved           [] Progressing    NUTRITION INTERVENTIONS:  Meals/Snacks: General/healthful diet                  GOAL:   Pt will consume >50% of meals with BG <160mg/dL in 3-5 days. NUTRITION MONITORING AND EVALUATION      Food/Nutrient Intake Outcomes:  Total energy intake  Physical Signs/Symptoms Outcomes: GI, Glucose profile, GI profile, Electrolyte and renal profile, Weight/weight change    Previous Goal Met:   [x] Met              [] Progressing Towards Goal              [] Not Progressing Towards Goal   Previous Recommendations:   [x] Implemented          [] Not Implemented          [] Not Applicable    LEARNING NEEDS (Diet, Food/Nutrient-Drug Interaction):    [x] None Identified   [] Identified and Education Provided/Documented   [] Identified and Pt declined/was not appropriate     Cultural, Jew, OR Ethnic Dietary Needs:    [x] None Identified   [] Identified and Addressed     [x] Interdisciplinary Care Plan Reviewed/Documented    [x] Discharge Planning: Consistent carb diet   [] Participated in Interdisciplinary Rounds    NUTRITION RISK:    [] High              [x] Moderate           []  Low  []  Minimal/Uncompromised      Viridiana Bonner RDN  Pager [phone]  Weekend Pager [phone]

## 2018-05-10 NOTE — DIABETES MGMT
DTC Progress Note    Recommendations/ Comments: Pt with low BG yesterday am and this am. : If appropriate, please consider decreasing Lantus to 32 units daily. May consider adding correction scale- high sensitivity     Chart reviewed on Dianna Fairbanks. Patient is a 26 y.o. female with 12 year history of Type 1 diabetes on Lantus 42 units, Lispro 1 unit for every 8 grams CHO ac or with 15 minutes of start of meals at home. A1c:   Lab Results   Component Value Date/Time    Hemoglobin A1c 11.7 (H) 05/04/2018 11:33 AM    Hemoglobin A1c 11.5 (H) 12/09/2017 12:15 AM       Recent Glucose Results:   Lab Results   Component Value Date/Time    GLU 89 05/10/2018 05:14 AM    GLUCPOC 89 05/10/2018 10:37 AM    GLUCPOC 72 05/10/2018 08:34 AM    GLUCPOC 70 05/10/2018 08:05 AM        Lab Results   Component Value Date/Time    Creatinine 0.38 (L) 05/10/2018 05:14 AM     Estimated Creatinine Clearance: 237.2 mL/min (based on Cr of 0.38). Active Orders   Diet    DIET DIABETIC CONSISTENT CARB Regular        PO intake: No data found. Current hospital DM medications: Lantus 38 units, Lispro 1 unit for every 8 grams CHO ac or with 15 minutes of start of meals    Will continue to follow as needed. Thank you.   Aixa Beck, [address] [place] [address]  Office:  [phone]

## 2018-05-10 NOTE — PROGRESS NOTES
Oncology Nursing Communication Tool  6:52 PM  5/10/2018     Bedside and Verbal shift change report given to JONATHAN Jones (incoming nurse) by Arthur Piage (outgoing nurse) on Jonelle Harris. Report included the following information SBAR, Kardex, Procedure Summary, Intake/Output, MAR, Accordion and Recent Results. Shift Summary:       Issues for physician to address: Oncology Shift Note   Admission Date 5/4/2018   Admission Diagnosis Sepsis ([address]  Vulva Abscess   Code Status Full Code   Consults IP CONSULT TO GENERAL SURGERY  IP CONSULT TO GENERAL SURGERY  IP CONSULT TO OB GYN      Cardiac Monitoring [] Yes [] No      Purposeful Hourly Rounding [] Yes    Christina Score Total Score: 1   Christina score 3 or > [] Bed Alarm [] Avasys [] 1:1 sitter [] Patient refused (Place signed refusal form in chart)      Pain Managed [] Yes [] No    Key Pain Meds     The patient is on no pain meds. Influenza Vaccine Received Flu Vaccine for Current Season (usually Sept-March): Not Flu Season           Oxygen needs?  [] Room air Oxygen @  []1L    []2L    []3L   []4L    []5L   []6L     Use home O2? [] Yes [] No  Perform O2 challenge test using  smartphrase (.oxygenchallenge)      Last bowel movement Last Bowel Movement Date: 05/08/18  bowel movement      Urinary Catheter             LDAs               Peripheral IV 05/09/18 Left Forearm (Active)   Site Assessment Clean, dry, & intact 5/10/2018  3:00 PM   Phlebitis Assessment 0 5/10/2018  3:00 PM   Infiltration Assessment 0 5/10/2018  3:00 PM   Dressing Status Clean, dry, & intact 5/10/2018  3:00 PM   Dressing Type Transparent;Tape 5/10/2018  3:00 PM   Hub Color/Line Status Pink;Flushed;Patent 5/10/2018  3:00 PM   Action Taken Other (comment) 5/9/2018  5:14 PM                         Readmission Risk Assessment Tool Score Low Risk            10       Total Score        3 Has Seen PCP in Last 6 Months (Yes=3, No=0)    3 Patient Length of Stay (>5 days = 3) 4 IP Visits Last 12 Months (1-3=4, 4=9, >4=11)        Criteria that do not apply:    . Living with Significant Other. Assisted Living. LTAC. SNF. or   Rehab    Pt.  Coverage (Medicare=5 , Medicaid, or Self-Pay=4)    Charlson Comorbidity Score (Age + Comorbid Conditions)       Expected Length of Stay 3d 16h   Actual Length of Stay [place]

## 2018-05-11 VITALS
BODY MASS INDEX: 25.79 KG/M2 | DIASTOLIC BLOOD PRESSURE: 92 MMHG | HEIGHT: 66 IN | HEART RATE: 99 BPM | WEIGHT: 160.5 LBS | OXYGEN SATURATION: 96 % | SYSTOLIC BLOOD PRESSURE: 141 MMHG | TEMPERATURE: 98.7 F | RESPIRATION RATE: 16 BRPM

## 2018-05-11 PROBLEM — A41.9 SEPSIS (HCC): Status: RESOLVED | Noted: 2018-05-04 | Resolved: 2018-05-11

## 2018-05-11 LAB
GLUCOSE BLD STRIP.AUTO-MCNC: 120 MG/DL (ref 65–100)
GLUCOSE BLD STRIP.AUTO-MCNC: 61 MG/DL (ref 65–100)
GLUCOSE BLD STRIP.AUTO-MCNC: 80 MG/DL (ref 65–100)
GLUCOSE BLD STRIP.AUTO-MCNC: 91 MG/DL (ref 65–100)
SERVICE CMNT-IMP: ABNORMAL
SERVICE CMNT-IMP: ABNORMAL
SERVICE CMNT-IMP: NORMAL
SERVICE CMNT-IMP: NORMAL

## 2018-05-11 PROCEDURE — 74011000258 HC RX REV CODE- 258: Performed by: INTERNAL MEDICINE

## 2018-05-11 PROCEDURE — 82962 GLUCOSE BLOOD TEST: CPT

## 2018-05-11 PROCEDURE — 74011250636 HC RX REV CODE- 250/636: Performed by: INTERNAL MEDICINE

## 2018-05-11 PROCEDURE — 74011250637 HC RX REV CODE- 250/637: Performed by: INTERNAL MEDICINE

## 2018-05-11 PROCEDURE — 74011000250 HC RX REV CODE- 250: Performed by: INTERNAL MEDICINE

## 2018-05-11 RX ORDER — CEFDINIR 300 MG/1
300 CAPSULE ORAL 2 TIMES DAILY
Qty: 14 CAP | Refills: 0 | Status: SHIPPED | OUTPATIENT
Start: 2018-05-11

## 2018-05-11 RX ORDER — LIDOCAINE HYDROCHLORIDE 40 MG/ML
SOLUTION TOPICAL AS NEEDED
Status: DISCONTINUED | OUTPATIENT
Start: 2018-05-11 | End: 2018-05-11 | Stop reason: ALTCHOICE

## 2018-05-11 RX ORDER — INSULIN GLARGINE 100 [IU]/ML
38 INJECTION, SOLUTION SUBCUTANEOUS
Qty: 2 VIAL | Refills: 0 | Status: SHIPPED | OUTPATIENT
Start: 2018-05-11

## 2018-05-11 RX ORDER — MORPHINE SULFATE 10 MG/ML
1 INJECTION, SOLUTION INTRAMUSCULAR; INTRAVENOUS ONCE
Status: COMPLETED | OUTPATIENT
Start: 2018-05-11 | End: 2018-05-11

## 2018-05-11 RX ORDER — LIDOCAINE HYDROCHLORIDE 20 MG/ML
JELLY TOPICAL AS NEEDED
Status: DISCONTINUED | OUTPATIENT
Start: 2018-05-11 | End: 2018-05-11 | Stop reason: HOSPADM

## 2018-05-11 RX ORDER — OXYCODONE AND ACETAMINOPHEN 10; 325 MG/1; MG/1
1 TABLET ORAL
Qty: 20 TAB | Refills: 0 | Status: SHIPPED | OUTPATIENT
Start: 2018-05-11

## 2018-05-11 RX ADMIN — MORPHINE SULFATE 1 MG: 10 INJECTION INTRAVENOUS at 10:46

## 2018-05-11 RX ADMIN — AMPICILLIN SODIUM AND SULBACTAM SODIUM 3 G: 2; 1 INJECTION, POWDER, FOR SOLUTION INTRAMUSCULAR; INTRAVENOUS at 04:55

## 2018-05-11 RX ADMIN — DEXTROSE MONOHYDRATE 12.5 G: 25 INJECTION, SOLUTION INTRAVENOUS at 12:45

## 2018-05-11 RX ADMIN — ONDANSETRON 4 MG: 2 INJECTION INTRAMUSCULAR; INTRAVENOUS at 08:24

## 2018-05-11 RX ADMIN — Medication 10 ML: at 04:54

## 2018-05-11 RX ADMIN — OXYCODONE HYDROCHLORIDE AND ACETAMINOPHEN 1 TABLET: 10; 325 TABLET ORAL at 08:24

## 2018-05-11 RX ADMIN — AMPICILLIN SODIUM AND SULBACTAM SODIUM 3 G: 2; 1 INJECTION, POWDER, FOR SOLUTION INTRAMUSCULAR; INTRAVENOUS at 10:46

## 2018-05-11 RX ADMIN — MORPHINE SULFATE 2 MG: 4 INJECTION INTRAVENOUS at 04:54

## 2018-05-11 RX ADMIN — IBUPROFEN 600 MG: 600 TABLET, FILM COATED ORAL at 08:24

## 2018-05-11 RX ADMIN — ONDANSETRON 4 MG: 2 INJECTION INTRAMUSCULAR; INTRAVENOUS at 01:16

## 2018-05-11 RX ADMIN — OXYCODONE HYDROCHLORIDE AND ACETAMINOPHEN 1 TABLET: 10; 325 TABLET ORAL at 01:16

## 2018-05-11 NOTE — INTERDISCIPLINARY ROUNDS
Oncology Interdisciplinary rounds were held today to discuss patient plan of care and outcomes. The following members were present: Nursing, Case Management, Pharmacy and Dietary.     Actual Length of Stay: 7    DRG GLOS: 3.7    Expected Length of Stay: 3d 16h                Plan for Day        Mobility        Plan for Stay      Plan for Way   Possible DC today  IV ABX switched to PO today Up in chair with meals Continue current TX Potential DC today

## 2018-05-11 NOTE — DISCHARGE INSTRUCTIONS
HOSPITALIST DISCHARGE INSTRUCTIONS    NAME: Divina Person   :  1995   MRN:  [id number]     Date/Time:  2018 1:34 PM    ADMIT DATE: 2018   DISCHARGE DATE: 2018     Attending Physician: Carlos Gastelum DO    DISCHARGE DIAGNOSIS:    Skin Abscesses. Finish your antibiotic course by taking omnicef 300mg twice a day for another 7 days until gone. [facility] one week for packing removal.    Type 1 diabetes. You had less lantus insulin during your hospital stay. Decrease you lantus to 38units daily    MEDICATIONS:  See above    · It is important that you take the medication exactly as they are prescribed. · Keep your medication in the bottles provided by the pharmacist and keep a list of the medication names, dosages, and times to be taken in your wallet. · Do not take other medications without consulting your doctor. Pain Management: per above medications    What to do at Home    Recommended diet:  diabetic    Recommended activity: no soaks or baths until otherwise advised. If you have questions regarding the hospital related prescriptions or hospital related issues please call [facility] at . You can always direct your questions to your primary care doctor if you are unable to reach your hospital physician; your PCP works as an extension of your hospital doctor just like your hospital doctor is an extension of your PCP for your time at [facility]. If you experience any of the following symptoms then please call your primary care physician or return to the emergency room if you cannot get hold of your doctor:  Fever, chills, nausea, vomiting, diarrhea, change in mentation, falling, bleeding, shortness of breath    Additional Instructions:      Bring these papers with you to your follow up appointments.  The papers will help your doctors be sure to continue the care plan from the [facility].              Information obtained by :  I understand that if any problems occur once I am at home I am to contact my physician. I understand and acknowledge receipt of the instructions indicated above.                                                                                                                                            Physician's or R.N.'s Signature                                                                  Date/Time                                                                                                                                              Patient or Representative Signature                                                          Da

## 2018-05-11 NOTE — PROGRESS NOTES
NOTIFICATION RETURN TO WORK           5/11/2018    RE: Ms. Garcia Chaudhari  [address] [place] [id number]    To Whom it May Concern: This is to certify that Garcia Chaudhari may return to work on May 21, 2018 as tolerated due to medical problems. Ms. Ebony Ty was admitted to the hospital, [facility],  on May 4th, 2018 and discharged 5/11/18. Please contact my office if you have any questions or concerns, call [facility] at [phone]. Gretchen Alvarado Thank you for your assistance in this matter.     Sincerely,   Theo Olivera, DO    5/11/20182:14 PM

## 2018-05-11 NOTE — PROGRESS NOTES
Oncology Nursing Communication Tool  7:26 AM  5/11/2018     Bedside shift change report given to Anna Coats RN (incoming nurse) by Cesia Parikh RN (outgoing nurse) on Nicole Diaz. Report included the following information SBAR, Kardex, MAR and Accordion. Shift Summary: Pt not eating, low blood sugar, given D50, lantus decreased to 10 units      Issues for physician to address: Pain management, d/c cardiac monitoring       Oncology Shift Note   Admission Date 5/4/2018   Admission Diagnosis Sepsis ([address]  Vulva Abscess   Code Status Full Code   Consults IP CONSULT TO GENERAL SURGERY  IP CONSULT TO GENERAL SURGERY  IP CONSULT TO OB GYN      Cardiac Monitoring [x] Yes [] No      Purposeful Hourly Rounding [x] Yes    Christina Score Total Score: 1   Christina score 3 or > [] Bed Alarm [] Avasys [] 1:1 sitter [] Patient refused (Place signed refusal form in chart)      Pain Managed [] Yes [x] No    Key Pain Meds     The patient is on no pain meds. Influenza Vaccine Received Flu Vaccine for Current Season (usually Sept-March): Not Flu Season           Oxygen needs? [x] Room air Oxygen @  []1L    []2L    []3L   []4L    []5L   []6L     Use home O2? [] Yes [] No  Perform O2 challenge test using  smartphrase (.oxygenchallenge)      Last bowel movement Last Bowel Movement Date: 05/08/18  bowel movement      Urinary Catheter             LDAs               Peripheral IV 05/09/18 Left Forearm (Active)   Site Assessment Clean, dry, & intact 5/11/2018  3:27 AM   Phlebitis Assessment 0 5/11/2018  3:27 AM   Infiltration Assessment 0 5/11/2018  3:27 AM   Dressing Status Clean, dry, & intact 5/11/2018  3:27 AM   Dressing Type Transparent;Tape 5/11/2018  3:27 AM   Hub Color/Line Status Pink;Patent; Infusing 5/11/2018  3:27 AM   Action Taken Other (comment) 5/9/2018  5:14 PM                         Readmission Risk Assessment Tool Score Low Risk            10       Total Score        3 Has Seen PCP in Last 6 Months (Yes=3, No=0)    3 Patient Length of Stay (>5 days = 3)    4 IP Visits Last 12 Months (1-3=4, 4=9, >4=11)        Criteria that do not apply:    . Living with Significant Other. Assisted Living. LTAC. SNF. or   Rehab    Pt.  Coverage (Medicare=5 , Medicaid, or Self-Pay=4)    Charlson Comorbidity Score (Age + Comorbid Conditions)       Expected Length of Stay 3d 16h   Actual Length of Stay Natalie Vidal RN

## 2018-05-11 NOTE — PROGRESS NOTES
Gynecology Progress Note    Patient doing well post-op day 3 from Procedure(s):  INCISION AND DRAINAGE LEFT VULVA ABSCESS  without significant complaints. Pain controlled on current medication and less at op site. Voiding without difficulty. Vitals:  Blood pressure (!) 141/92, pulse 99, temperature 98.7 °F (37.1 °C), resp. rate 16, height 5' 6\" (1.676 m), weight 72.8 kg (160 lb 7.9 oz), last menstrual period 2018, SpO2 96 %, not currently breastfeeding. Temp (24hrs), Av.4 °F (36.9 °C), Min:97.9 °F (36.6 °C), Max:98.7 °F (37.1 °C)        Exam:  Patient without distress. Abdomen soft,  nontender. Incision dry and clean with minimal beka-incisional erythema. Packing removed partially and cut. Lab/Data Review: All lab results for the last 24 hours reviewed. Assessment and Plan:  Patient appears to be having uncomplicated post Procedure(s):  INCISION AND DRAINAGE LEFT VULVA ABSCESS  course. Continue routine post-op care. OK with discharge from GYN standpoint. Follow up with [address] [place], [place] [address]y early next week for completion of packing removal. Pt to be discharged on oral antibiotics as per medicine.

## 2018-05-11 NOTE — PROGRESS NOTES
I have reviewed discharge instructions with the patient. The patient verbalized understanding. Discharge medications reviewed with patient and appropriate educational materials and side effects teaching were provided. Follow-up appointments reviewed with patient. Opportunity for questions or concerns given. Venous access removed without difficulty, pt tolerated well. Patients belongings gathered and sent with patient. Patient is ready for discharge.

## 2018-05-11 NOTE — ROUTINE PROCESS
PCP TIERRA appt scheduled with Dr. Simon Salter on May 17 at 10:45am. Appt added to [address] CM Specialist  PCP DILAN PATEL appt scheduled with [facility] to see PT in 24-48 hours after discharge.  Appt added to AVS. [place] CM Specialist

## 2018-05-12 LAB
C TRACH SPEC QL CULT: NEGATIVE
SPECIMEN SOURCE: NORMAL

## 2018-05-12 NOTE — DISCHARGE SUMMARY
Hospitalist discharge summary     NAME: Felicitas Gilbert   :  1995   MRN:  [id number]       Discharge summary and hospital course:  Medial R buttock abscess, S/P I& D - done in ER  SIRS on admission, T 99   PAIN  Lactic acidosis - resolved. Left Labia abscess, fluctuant on  - s/p I&D   -CT a/p showed small fluid collections in the L labia and medial R buttock  -wound culture from buttock abscess grew Beta Hemolytic Strep, yeast, and anaerobic gram neg korina. -Bcx negative  -cont' Unasyn. End date . May need to change to oral med.  -pain well controlled on Percocet prn, motrin   -IV fluids discontinued.  -wound care per GYN  -discharged on oral antibiotics, and follow up with ob/gyn     T1DM  -hypoglycemic this morning but asymptomatic. BG was 54 this morning. Reportedly appetite has been poor.    -will decrease lantus to 38U qhs. Her home dose is 42U qhs  -last A1c in 2017 was 11.7  -SSI  -counseled on diet  -changes lantus to 38units daily on discharge.     Anxiety  -appears stable.         Code Status: Full  Surrogate Decision Maker: pt's mom  DVT Prophylaxis: scds  GI Prophylaxis: not indicated  Baseline: independent       Body mass index is 25.9 kg/(m^2). HPI:  Presented with pain, swelling and reddness in right gluteal fold and left labia majora, fever, nausea and vomitting. Pt noted pain is dull achey, throbbing, pressure in the areas makes pain worse, does not radiate, started 3 days ago, progressively worse. PMHx: diabetes, anxiety  PSHx: prior abscess  SoHx: etoh weekends  FMHx:. Mom htn    Prior to admission meds reviewed. NKDA    Pt seen by me on discharge    Subjective:     Chief Complaint / Reason for Physician Visit  Follow up for abscesses. Pt seen at bedside. Pain well  Controlled with current regimen.       Review of Systems:  Symptom Y/N Comments  Symptom Y/N Comments   Fever/Chills n   Chest Pain n    Poor Appetite n   Edema n    Cough n   Abdominal Pain n    Sputum n   Joint Pain     SOB/CHOE n   Pruritis/Rash     Nausea/vomit n   Tolerating PT/OT     Diarrhea n   Tolerating Diet n    Constipation    Other       Could NOT obtain due to:      Objective:     VITALS:   Last 24hrs VS reviewed since prior progress note. Most recent are:  Patient Vitals for the past 24 hrs:   Temp Pulse Resp BP SpO2   05/11/18 1127 98.7 °F (37.1 °C) 99 16 (!) 141/92 96 %     No intake or output data in the 24 hours ending 05/12/18 0931     PHYSICAL EXAM:  General: WD, WN. Alert, cooperative, no acute distress , eager for discharge   EENT:  EOMI. Anicteric sclerae. MMM  Resp:  CTA bilaterally, no wheezing or rales. No accessory muscle use  CV:  Regular  rhythm,  No edema  GI:  Soft, Non distended, Non tender.  +Bowel sounds  Neurologic:  Alert and oriented X 3, normal speech  Psych:   Good insight. Not anxious nor agitated  Skin:  No rashes. No jaundice:                       Diet and activity as tolerated. Discharge home with family  ________________________________________________________________________  Care Plan discussed with:    Comments   Patient y    Family  y mother   RN y    Care Manager     Consultant                        Multidiciplinary team rounds were held today with , nursing, pharmacist and clinical coordinator. Patient's plan of care was discussed; medications were reviewed and discharge planning was addressed. ________________________________________________________________________  Total discharge TIME:  32 Minutes    Total CRITICAL CARE TIME Spent:   Minutes non procedure based      Comments   >50% of visit spent in counseling and coordination of care     ________________________________________________________________________  Equilla Gastelum, DO     Procedures: see electronic medical records for all procedures/Xrays and details which were not copied into this note but were reviewed prior to creation of Plan.       LABS:  I reviewed today's most current labs and imaging studies. Pertinent labs include:  No results for input(s): WBC, HGB, HCT, PLT, HGBEXT, HCTEXT, PLTEXT, HGBEXT, HCTEXT, PLTEXT in the last 72 hours.   Recent Labs      05/10/18   0514   NA  137   K  4.1   CL  106   CO2  26   GLU  89   BUN  10   CREA  0.38*   CA  8.3*       Signed: Xenia Busch, DO

## 2019-02-26 NOTE — PROGRESS NOTES
GI Progress Note Jean Cadenadalia  Baby Sportsman :1995 [facility]:[id number]   Moisés Novoa MD  PCP: None  Date/Time:  2017 3:52 PM   Assessment:   · N/V- resolved; suspect due to her DKA  · Elevated LFTs- not rechecked; Hep A/B/C panel negative     Plan:   · Encourage PO  · Can f/u on her elevated LFTs with her PCP  · Will need to f/u with her PCP re: pending AMA, ASMA, cerruloplasmin   Will sign off  Subjective:   Discussed with RN events overnight. Feels much better    Complaint Y/N Description   Abdominal Pain n    Hematemesis n    Hematochezia n    Melena n    Constipation n    Diarrhea n    Dyspepsia n    Dysphagia n    Jaundiced n    Nausea/vomiting n      Review of Systems:  Symptom Y/N Comments  Symptom Y/N Comments   Fever/Chills n   Chest Pain n    Cough n   Headaches n    Sputum n   Joint Pain n    SOB/CHOE n   Pruritis/Rash n    Tolerating Diet y ADA  Other       Could NOT obtain due to:      Objective:   VITALS:   Last 24hrs VS reviewed since prior progress note. Most recent are:  Visit Vitals    /89 (BP 1 Location: Right arm, BP Patient Position: At rest)    Pulse 99    Temp 97.8 °F (36.6 °C)    Resp 18    Ht 5' 6\" (1.676 m)    Wt 71.3 kg (157 lb 3 oz)    SpO2 100%    Breastfeeding No    BMI 25.37 kg/m2       Intake/Output Summary (Last 24 hours) at 17 1552  Last data filed at 17 0328   Gross per 24 hour   Intake           759.55 ml   Output                0 ml   Net           759.55 ml     PHYSICAL EXAM:  General: WD, WN. Alert, cooperative, no acute distress    HEENT: NC, Atraumatic. PERRL, Anicteric sclerae. Extremities: No c/c/e  Neurologic:  CN 2-12 gi, A/O X 3. No acute neurological distress   Psych:   Good insight. Not anxious nor agitated.     Lab and Radiology Data Reviewed: (see below)    Medications Reviewed: (see below)  PMH/SH reviewed - no change compared to H&P  ________________________________________________________________________  Total time spent with patient: 10 minutes   ________________________________________________________________________  Care Plan discussed with:  Patient y   Family     RN y              Consultant:       Mahad Mcgraw MD     Procedures: see electronic medical records for all procedures/Xrays and details which were not copied into this note but were reviewed prior to creation of Plan. LABS:  Recent Labs      11/29/17   0155  11/28/17   1122   WBC  7.0  5.9   HGB  10.7*  12.6   HCT  33.0*  38.4   PLT  363  419*     Recent Labs      11/29/17   1118  11/29/17   0653  11/29/17   0155   NA  139  137  139   K  4.5  4.8  3.5   CL  108  109*  108   CO2  19*  15*  14*   BUN  5*  7  7   CREA  0.85  0.80  0.99   GLU  206*  254*  148*   CA  7.9*  8.0*  7.7*   MG  2.0  2.5*  1.5*   PHOS  2.2*  2.7  1.7*     Recent Labs      11/28/17   1140   SGOT  75*   AP  216*   TP  7.6   ALB  3.0*   GLOB  4.6*     Recent Labs      11/28/17   1655   INR  1.0   PTP  9.8   APTT  22.6      Recent Labs      11/28/17   1655   FERR  33      No results found for: FOL, RBCF  No results for input(s): PH, PCO2, PO2 in the last 72 hours. No results for input(s): CPK, CKMB in the last 72 hours.     No lab exists for component: TROPONINI  Lab Results   Component Value Date/Time    Color YELLOW/STRAW 11/28/2017 11:55 AM    Appearance CLEAR 11/28/2017 11:55 AM    Specific gravity 1.010 11/28/2017 11:55 AM    Specific gravity 1.027 10/24/2017 12:12 PM    pH (UA) 6.0 11/28/2017 11:55 AM    Protein NEGATIVE  11/28/2017 11:55 AM    Glucose >1000 11/28/2017 11:55 AM    Ketone 40 11/28/2017 11:55 AM    Bilirubin NEGATIVE  11/28/2017 11:55 AM    Urobilinogen 0.2 11/28/2017 11:55 AM    Nitrites NEGATIVE  11/28/2017 11:55 AM    Leukocyte Esterase NEGATIVE  11/28/2017 11:55 AM    Epithelial cells FEW 11/28/2017 11:55 AM    Bacteria NEGATIVE  11/28/2017 11:55 AM    WBC 0-4 11/28/2017 11:55 AM    RBC 0-5 11/28/2017 11:55 AM       MEDICATIONS:  Current Facility-Administered Medications Medication Dose Route Frequency    insulin glargine (LANTUS) injection 40 Units  40 Units SubCUTAneous DAILY    sodium chloride (NS) flush 5-10 mL  5-10 mL IntraVENous Q8H    sodium chloride (NS) flush 5-10 mL  5-10 mL IntraVENous PRN    glucose chewable tablet 16 g  4 Tab Oral PRN    dextrose (D50W) injection syrg 12.5-25 g  12.5-25 g IntraVENous PRN    glucagon (GLUCAGEN) injection 1 mg  1 mg IntraMUSCular PRN    sodium chloride (NS) flush 5-10 mL  5-10 mL IntraVENous Q8H    sodium chloride (NS) flush 5-10 mL  5-10 mL IntraVENous PRN    acetaminophen (TYLENOL) tablet 650 mg  650 mg Oral Q6H PRN    ondansetron (ZOFRAN) injection 4 mg  4 mg IntraVENous Q6H PRN    enoxaparin (LOVENOX) injection 40 mg  40 mg SubCUTAneous Q24H [Initial Visit] : an initial visit for [FreeTextEntry2] : right hand pain

## (undated) DEVICE — GOWN,SIRUS,FABRNF,2XL,18/CS: Brand: MEDLINE

## (undated) DEVICE — IODOFORM PACKING STRIP: Brand: CURITY

## (undated) DEVICE — SYR 10ML LUER LOK 1/5ML GRAD --

## (undated) DEVICE — HANDLE LT SNAP ON ULT DURABLE LENS FOR TRUMPF ALC DISPOSABLE

## (undated) DEVICE — SURGICAL PROCEDURE PACK BASIN MAJ SET CUST NO CAUT

## (undated) DEVICE — DBD-PACK,LAPAROTOMY,2 REINFORCED GOWNS: Brand: MEDLINE

## (undated) DEVICE — SOLUTION IV 1000ML 0.9% SOD CHL

## (undated) DEVICE — REM POLYHESIVE ADULT PATIENT RETURN ELECTRODE: Brand: VALLEYLAB

## (undated) DEVICE — 3M(TM) MEDIPORE(TM) H SOFT CLOTH TAPE 2866: Brand: 3M™ MEDIPORE™

## (undated) DEVICE — DEVON™ KNEE AND BODY STRAP 60" X 3" (1.5 M X 7.6 CM): Brand: DEVON

## (undated) DEVICE — ROCKER SWITCH PENCIL BLADE ELECTRODE, HOLSTER: Brand: EDGE

## (undated) DEVICE — NEEDLE HYPO 25GA L1.5IN BVL ORIENTED ECLIPSE

## (undated) DEVICE — KENDALL SCD EXPRESS SLEEVES, KNEE LENGTH, MEDIUM: Brand: KENDALL SCD

## (undated) DEVICE — GAUZE SPONGES,12 PLY: Brand: CURITY

## (undated) DEVICE — INFECTION CONTROL KIT SYS

## (undated) DEVICE — INTENDED FOR TISSUE SEPARATION, AND OTHER PROCEDURES THAT REQUIRE A SHARP SURGICAL BLADE TO PUNCTURE OR CUT.: Brand: BARD-PARKER ® CARBON RIB-BACK BLADES